# Patient Record
Sex: MALE | Race: WHITE | NOT HISPANIC OR LATINO | Employment: OTHER | URBAN - METROPOLITAN AREA
[De-identification: names, ages, dates, MRNs, and addresses within clinical notes are randomized per-mention and may not be internally consistent; named-entity substitution may affect disease eponyms.]

---

## 2017-02-25 ENCOUNTER — HOSPITAL ENCOUNTER (EMERGENCY)
Facility: HOSPITAL | Age: 72
Discharge: ANOTHER HOSPITAL | End: 2017-02-25
Attending: SURGERY
Payer: MEDICARE

## 2017-02-25 VITALS
WEIGHT: 270 LBS | HEART RATE: 64 BPM | SYSTOLIC BLOOD PRESSURE: 153 MMHG | DIASTOLIC BLOOD PRESSURE: 80 MMHG | TEMPERATURE: 97 F | OXYGEN SATURATION: 97 % | RESPIRATION RATE: 17 BRPM

## 2017-02-25 DIAGNOSIS — R07.9 CHEST PAIN, UNSPECIFIED TYPE: Primary | ICD-10-CM

## 2017-02-25 LAB
ALBUMIN SERPL BCP-MCNC: 3.5 G/DL
ALP SERPL-CCNC: 65 U/L
ALT SERPL W/O P-5'-P-CCNC: 23 U/L
ANION GAP SERPL CALC-SCNC: 9 MMOL/L
APTT BLDCRRT: 33.9 SEC
AST SERPL-CCNC: 20 U/L
BASOPHILS # BLD AUTO: 0.05 K/UL
BASOPHILS NFR BLD: 0.6 %
BILIRUB SERPL-MCNC: 0.5 MG/DL
BNP SERPL-MCNC: 108 PG/ML
BUN SERPL-MCNC: 14 MG/DL
CALCIUM SERPL-MCNC: 8.5 MG/DL
CHLORIDE SERPL-SCNC: 109 MMOL/L
CK MB SERPL-MCNC: 4 NG/ML
CK MB SERPL-RTO: 1.6 %
CK SERPL-CCNC: 244 U/L
CK SERPL-CCNC: 244 U/L
CO2 SERPL-SCNC: 22 MMOL/L
CREAT SERPL-MCNC: 1 MG/DL
D DIMER PPP IA.FEU-MCNC: 0.29 MG/L FEU
DIFFERENTIAL METHOD: ABNORMAL
EOSINOPHIL # BLD AUTO: 0.3 K/UL
EOSINOPHIL NFR BLD: 3.5 %
ERYTHROCYTE [DISTWIDTH] IN BLOOD BY AUTOMATED COUNT: 13.3 %
EST. GFR  (AFRICAN AMERICAN): >60 ML/MIN/1.73 M^2
EST. GFR  (NON AFRICAN AMERICAN): >60 ML/MIN/1.73 M^2
GLUCOSE SERPL-MCNC: 139 MG/DL
HCT VFR BLD AUTO: 40.8 %
HGB BLD-MCNC: 14.2 G/DL
INR PPP: 2.6
LYMPHOCYTES # BLD AUTO: 2.5 K/UL
LYMPHOCYTES NFR BLD: 28.8 %
MCH RBC QN AUTO: 32.1 PG
MCHC RBC AUTO-ENTMCNC: 34.8 %
MCV RBC AUTO: 92 FL
MONOCYTES # BLD AUTO: 0.8 K/UL
MONOCYTES NFR BLD: 9.1 %
NEUTROPHILS # BLD AUTO: 5 K/UL
NEUTROPHILS NFR BLD: 58 %
PLATELET # BLD AUTO: 213 K/UL
PMV BLD AUTO: 8.7 FL
POTASSIUM SERPL-SCNC: 3.9 MMOL/L
PROT SERPL-MCNC: 6.8 G/DL
PROTHROMBIN TIME: 25.7 SEC
RBC # BLD AUTO: 4.43 M/UL
SODIUM SERPL-SCNC: 140 MMOL/L
TROPONIN I SERPL DL<=0.01 NG/ML-MCNC: 0.02 NG/ML
WBC # BLD AUTO: 8.55 K/UL

## 2017-02-25 PROCEDURE — 36415 COLL VENOUS BLD VENIPUNCTURE: CPT

## 2017-02-25 PROCEDURE — 85025 COMPLETE CBC W/AUTO DIFF WBC: CPT

## 2017-02-25 PROCEDURE — 25000003 PHARM REV CODE 250: Performed by: SURGERY

## 2017-02-25 PROCEDURE — 99285 EMERGENCY DEPT VISIT HI MDM: CPT

## 2017-02-25 PROCEDURE — 85610 PROTHROMBIN TIME: CPT

## 2017-02-25 PROCEDURE — 84484 ASSAY OF TROPONIN QUANT: CPT

## 2017-02-25 PROCEDURE — 82553 CREATINE MB FRACTION: CPT

## 2017-02-25 PROCEDURE — 80053 COMPREHEN METABOLIC PANEL: CPT

## 2017-02-25 PROCEDURE — 93005 ELECTROCARDIOGRAM TRACING: CPT

## 2017-02-25 PROCEDURE — 85379 FIBRIN DEGRADATION QUANT: CPT

## 2017-02-25 PROCEDURE — 93010 ELECTROCARDIOGRAM REPORT: CPT | Mod: ,,, | Performed by: INTERNAL MEDICINE

## 2017-02-25 PROCEDURE — 85730 THROMBOPLASTIN TIME PARTIAL: CPT

## 2017-02-25 PROCEDURE — 83880 ASSAY OF NATRIURETIC PEPTIDE: CPT

## 2017-02-25 RX ORDER — SIMVASTATIN 20 MG/1
20 TABLET, FILM COATED ORAL NIGHTLY
Status: ON HOLD | COMMUNITY
End: 2022-11-03 | Stop reason: HOSPADM

## 2017-02-25 RX ORDER — ATORVASTATIN CALCIUM 40 MG/1
40 TABLET, FILM COATED ORAL
Status: COMPLETED | OUTPATIENT
Start: 2017-02-25 | End: 2017-02-25

## 2017-02-25 RX ORDER — PANTOPRAZOLE SODIUM 40 MG/1
40 TABLET, DELAYED RELEASE ORAL
Status: COMPLETED | OUTPATIENT
Start: 2017-02-25 | End: 2017-02-25

## 2017-02-25 RX ORDER — RAMIPRIL 5 MG/1
5 CAPSULE ORAL DAILY
Status: ON HOLD | COMMUNITY
End: 2022-11-03 | Stop reason: SDUPTHER

## 2017-02-25 RX ORDER — NAPROXEN SODIUM 220 MG/1
81 TABLET, FILM COATED ORAL
Status: COMPLETED | OUTPATIENT
Start: 2017-02-25 | End: 2017-02-25

## 2017-02-25 RX ADMIN — NITROGLYCERIN 1 INCH: 20 OINTMENT TOPICAL at 11:02

## 2017-02-25 RX ADMIN — ATORVASTATIN CALCIUM 40 MG: 40 TABLET, FILM COATED ORAL at 11:02

## 2017-02-25 RX ADMIN — ASPIRIN 81 MG 81 MG: 81 TABLET ORAL at 10:02

## 2017-02-25 RX ADMIN — PANTOPRAZOLE SODIUM 40 MG: 40 TABLET, DELAYED RELEASE ORAL at 11:02

## 2017-02-26 PROBLEM — K21.9 GERD (GASTROESOPHAGEAL REFLUX DISEASE): Chronic | Status: ACTIVE | Noted: 2017-02-26

## 2017-02-26 PROBLEM — I48.91 ATRIAL FIBRILLATION: Chronic | Status: ACTIVE | Noted: 2017-02-26

## 2017-02-26 PROBLEM — R07.9 CHEST PAIN AT REST: Status: ACTIVE | Noted: 2017-02-26

## 2017-02-26 NOTE — ED PROVIDER NOTES
[facility]                                        February 25, 2017                   Chief Complaint  71 y.o. male with Chest Pain    History of Present Illness  Luis Sorto Jr. presents to the emergency room with chest pain since this afternoon  Patient has had sharp substernal on-again off-again chest pain for last 4 hours per wife  Patient on EKG has rate controlled atrial fibrillation with no signs of RVR in the ER now  Patient has no shortness of breath, 96% room air oxygenation on presentation in the ER  The patient states that the pain is sharp, took aspirin at home with no relief this afternoon  Patient states the chest pain is new, he has no history of angina, no noted history of CAD    The history is provided by the patient  Past medical history: Atrial fibrillation  History reviewed. No pertinent surgical history.   No Known Allergies     Review of Systems and Physical Exam     Review of Systems  -- Constitution - no fever, denies fatigue, no weakness, no chills  -- Eyes - no tearing or redness, no visual disturbance  -- Ear, Nose - no tinnitus or earache, no nasal congestion or discharge  -- Mouth,Throat - no sore throat, no toothache, normal voice, normal swallowing  -- Respiratory - denies cough and congestion, no shortness of breath, no CORDOVA  -- Cardiovascular - chest pain, no palpitations, denies claudication  -- Gastrointestinal - denies abdominal pain, nausea, vomiting, or diarrhea  -- Musculoskeletal - denies back pain, negative for myalgias and arthralgias   -- Neurological - no headache, denies weakness or seizure; no LOC  -- Skin - denies pallor, rash, or changes in skin. no hives or welts noted    Vital Signs  -- His blood pressure is 148/74 (abnormal) and his pulse is 73.   -- His respiration is 16 and oxygen saturation is 96%.      Physical Exam  -- Nursing note and vitals reviewed  -- Constitutional: Appears well-developed and well-nourished  -- Head: Atraumatic.  Normocephalic. No obvious abnormality  -- Eyes: Pupils are equal and reactive to light. Normal conjunctiva and lids  -- Cardiac: Normal rate, regular rhythm and normal heart sounds  -- Pulmonary: Normal respiratory effort, breath sounds clear to auscultation  -- Abdominal: Soft, no tenderness. Normal bowel sounds. Normal liver edge  -- Musculoskeletal: Normal range of motion, no effusions. Joints stable   -- Neurological: No focal deficits. Showed good interaction with staff    Emergency Room Course     Treatment and Evaluation  -- The EKG findings today were without concerning findings from baseline   -- Chest x-ray showed no infiltrate and showed no acute pathology   -- The CBC drawn in the ER today was within normal limits   -- The electrolytes drawn in the ER today were within normal limits   -- The PT, PTT, and INR were within normal limits.    -- The cardiac enzymes were within normal limits   -- The d-dimer drawn in the ER today were within normal limits.   -- The BNP drawn in the ER today were within normal limits     Medications Given  -- pantoprazole EC tablet 40 mg (not administered)   -- atorvastatin tablet 40 mg (not administered)   -- nitroGLYCERIN 2% TD oint ointment 1 inch (not administered)   -- aspirin chewable tablet 81 mg (81 mg Oral Given 2/25/17 9870)     Diagnosis  -- The encounter diagnosis was Chest pain, unspecified type.    Disposition and Plan  -- Disposition: transfer  -- Condition: stable  -- The patient needs a higher level of care  -- The patient is appropriate for transfer  -- The patient was accepted for transfer at [place]    This note is dictated on Dragon Natural Speaking word recognition program.  There are word recognition mistakes that are occasionally missed on review.           Shay Kurtz MD  02/25/17 5935

## 2021-01-19 ENCOUNTER — IMMUNIZATION (OUTPATIENT)
Dept: FAMILY MEDICINE | Facility: CLINIC | Age: 76
End: 2021-01-19
Payer: MEDICARE

## 2021-01-19 DIAGNOSIS — Z23 NEED FOR VACCINATION: Primary | ICD-10-CM

## 2021-01-19 PROCEDURE — 91300 COVID-19, MRNA, LNP-S, PF, 30 MCG/0.3 ML DOSE VACCINE: CPT | Mod: PBBFAC | Performed by: INTERNAL MEDICINE

## 2021-02-09 ENCOUNTER — IMMUNIZATION (OUTPATIENT)
Dept: FAMILY MEDICINE | Facility: CLINIC | Age: 76
End: 2021-02-09
Payer: MEDICARE

## 2021-02-09 DIAGNOSIS — Z23 NEED FOR VACCINATION: Primary | ICD-10-CM

## 2021-02-09 PROCEDURE — 91300 COVID-19, MRNA, LNP-S, PF, 30 MCG/0.3 ML DOSE VACCINE: CPT | Mod: PBBFAC

## 2021-02-09 PROCEDURE — 0002A COVID-19, MRNA, LNP-S, PF, 30 MCG/0.3 ML DOSE VACCINE: CPT | Mod: PBBFAC

## 2022-11-03 PROBLEM — I48.19 PERSISTENT ATRIAL FIBRILLATION: Status: ACTIVE | Noted: 2017-02-26

## 2022-11-04 PROBLEM — I63.412 CEREBROVASCULAR ACCIDENT (CVA) DUE TO EMBOLISM OF LEFT MIDDLE CEREBRAL ARTERY: Status: ACTIVE | Noted: 2022-11-04

## 2022-11-05 ENCOUNTER — ANESTHESIA EVENT (OUTPATIENT)
Dept: SURGERY | Facility: HOSPITAL | Age: 77
DRG: 981 | End: 2022-11-05
Payer: MEDICARE

## 2022-11-05 ENCOUNTER — HOSPITAL ENCOUNTER (INPATIENT)
Facility: HOSPITAL | Age: 77
LOS: 10 days | Discharge: HOSPICE | DRG: 981 | End: 2022-11-15
Attending: PSYCHIATRY & NEUROLOGY | Admitting: PSYCHIATRY & NEUROLOGY
Payer: MEDICARE

## 2022-11-05 ENCOUNTER — ANESTHESIA (OUTPATIENT)
Dept: SURGERY | Facility: HOSPITAL | Age: 77
DRG: 981 | End: 2022-11-05
Payer: MEDICARE

## 2022-11-05 DIAGNOSIS — I48.19 PERSISTENT ATRIAL FIBRILLATION WITH RVR: ICD-10-CM

## 2022-11-05 DIAGNOSIS — I10 ESSENTIAL HYPERTENSION: ICD-10-CM

## 2022-11-05 DIAGNOSIS — E78.5 HYPERLIPIDEMIA, UNSPECIFIED HYPERLIPIDEMIA TYPE: ICD-10-CM

## 2022-11-05 DIAGNOSIS — I63.412 CEREBROVASCULAR ACCIDENT (CVA) DUE TO EMBOLISM OF LEFT MIDDLE CEREBRAL ARTERY: Primary | ICD-10-CM

## 2022-11-05 DIAGNOSIS — I63.9 CVA (CEREBRAL VASCULAR ACCIDENT): ICD-10-CM

## 2022-11-05 DIAGNOSIS — Z95.818 PRESENCE OF AMULET LEFT ATRIAL APPENDAGE CLOSURE DEVICE: ICD-10-CM

## 2022-11-05 DIAGNOSIS — C79.51 BONY METASTASIS: ICD-10-CM

## 2022-11-05 DIAGNOSIS — Z99.11 ON MECHANICALLY ASSISTED VENTILATION: ICD-10-CM

## 2022-11-05 DIAGNOSIS — E11.65 TYPE 2 DIABETES MELLITUS WITH HYPERGLYCEMIA, WITH LONG-TERM CURRENT USE OF INSULIN: ICD-10-CM

## 2022-11-05 DIAGNOSIS — Z71.89 GOALS OF CARE, COUNSELING/DISCUSSION: ICD-10-CM

## 2022-11-05 DIAGNOSIS — R50.9 FEVER, UNSPECIFIED FEVER CAUSE: ICD-10-CM

## 2022-11-05 DIAGNOSIS — R97.20 ELEVATED PSA: ICD-10-CM

## 2022-11-05 DIAGNOSIS — I63.9 STROKE: ICD-10-CM

## 2022-11-05 DIAGNOSIS — G93.6 CYTOTOXIC CEREBRAL EDEMA: ICD-10-CM

## 2022-11-05 DIAGNOSIS — I61.1 NONTRAUMATIC CORTICAL HEMORRHAGE OF LEFT CEREBRAL HEMISPHERE: ICD-10-CM

## 2022-11-05 DIAGNOSIS — R74.01 TRANSAMINITIS: ICD-10-CM

## 2022-11-05 DIAGNOSIS — T14.8XXA BRUISE: ICD-10-CM

## 2022-11-05 DIAGNOSIS — N30.00 ACUTE CYSTITIS WITHOUT HEMATURIA: ICD-10-CM

## 2022-11-05 DIAGNOSIS — G93.40 ACUTE ENCEPHALOPATHY: ICD-10-CM

## 2022-11-05 DIAGNOSIS — E87.1 HYPONATREMIA: ICD-10-CM

## 2022-11-05 DIAGNOSIS — J18.9 PNEUMONIA DUE TO INFECTIOUS ORGANISM, UNSPECIFIED LATERALITY, UNSPECIFIED PART OF LUNG: ICD-10-CM

## 2022-11-05 DIAGNOSIS — Z79.4 TYPE 2 DIABETES MELLITUS WITH HYPERGLYCEMIA, WITH LONG-TERM CURRENT USE OF INSULIN: ICD-10-CM

## 2022-11-05 DIAGNOSIS — I48.19 PERSISTENT ATRIAL FIBRILLATION: ICD-10-CM

## 2022-11-05 DIAGNOSIS — I63.413 CEREBROVASCULAR ACCIDENT (CVA) DUE TO BILATERAL EMBOLISM OF MIDDLE CEREBRAL ARTERIES: ICD-10-CM

## 2022-11-05 DIAGNOSIS — I72.4 PSEUDOANEURYSM OF FEMORAL ARTERY: ICD-10-CM

## 2022-11-05 DIAGNOSIS — G93.5 BRAIN COMPRESSION: ICD-10-CM

## 2022-11-05 PROBLEM — R19.09 GROIN SWELLING: Status: ACTIVE | Noted: 2022-11-05

## 2022-11-05 PROBLEM — E11.9 DIABETES MELLITUS: Status: ACTIVE | Noted: 2022-11-05

## 2022-11-05 PROBLEM — I61.9 LEFT-SIDED NONTRAUMATIC INTRACEREBRAL HEMORRHAGE: Status: ACTIVE | Noted: 2022-11-05

## 2022-11-05 LAB
ABO + RH BLD: NORMAL
ALBUMIN SERPL BCP-MCNC: 2.7 G/DL (ref 3.5–5.2)
ALLENS TEST: ABNORMAL
ALP SERPL-CCNC: 234 U/L (ref 55–135)
ALT SERPL W/O P-5'-P-CCNC: 11 U/L (ref 10–44)
ANION GAP SERPL CALC-SCNC: 17 MMOL/L (ref 8–16)
APTT BLDCRRT: 27.8 SEC (ref 21–32)
AST SERPL-CCNC: 17 U/L (ref 10–40)
AV INDEX (PROSTH): 1
AV MEAN GRADIENT: 3 MMHG
AV PEAK GRADIENT: 5 MMHG
AV VALVE AREA: 3.6 CM2
AV VELOCITY RATIO: 0.89
BACTERIA #/AREA URNS AUTO: ABNORMAL /HPF
BASOPHILS # BLD AUTO: 0.02 K/UL (ref 0–0.2)
BASOPHILS NFR BLD: 0.1 % (ref 0–1.9)
BILIRUB SERPL-MCNC: 2.4 MG/DL (ref 0.1–1)
BILIRUB UR QL STRIP: NEGATIVE
BLD GP AB SCN CELLS X3 SERPL QL: NORMAL
BLD PROD TYP BPU: NORMAL
BLOOD UNIT EXPIRATION DATE: NORMAL
BLOOD UNIT TYPE CODE: 6200
BLOOD UNIT TYPE CODE: NORMAL
BLOOD UNIT TYPE: NORMAL
BSA FOR ECHO PROCEDURE: 2.38 M2
BUN SERPL-MCNC: 13 MG/DL (ref 8–23)
CALCIUM SERPL-MCNC: 8.3 MG/DL (ref 8.7–10.5)
CHLORIDE SERPL-SCNC: 104 MMOL/L (ref 95–110)
CHOLEST SERPL-MCNC: 70 MG/DL (ref 120–199)
CHOLEST/HDLC SERPL: 2.7 {RATIO} (ref 2–5)
CK MB SERPL-MCNC: 2.4 NG/ML (ref 0.1–6.5)
CK MB SERPL-RTO: 2.1 % (ref 0–5)
CK SERPL-CCNC: 114 U/L (ref 20–200)
CLARITY UR REFRACT.AUTO: CLEAR
CO2 SERPL-SCNC: 15 MMOL/L (ref 23–29)
CODING SYSTEM: NORMAL
COLOR UR AUTO: YELLOW
CREAT SERPL-MCNC: 1.3 MG/DL (ref 0.5–1.4)
CV ECHO LV RWT: 0.6 CM
DELSYS: ABNORMAL
DIFFERENTIAL METHOD: ABNORMAL
DISPENSE STATUS: NORMAL
DOP CALC AO PEAK VEL: 1.1 M/S
DOP CALC AO VTI: 15.81 CM
DOP CALC LVOT AREA: 3.6 CM2
DOP CALC LVOT DIAMETER: 2.14 CM
DOP CALC LVOT PEAK VEL: 0.98 M/S
DOP CALC LVOT STROKE VOLUME: 56.87 CM3
DOP CALCLVOT PEAK VEL VTI: 15.82 CM
E/E' RATIO: 8.63 M/S
ECHO LV POSTERIOR WALL: 1.04 CM (ref 0.6–1.1)
EJECTION FRACTION: 45 %
EOSINOPHIL # BLD AUTO: 0 K/UL (ref 0–0.5)
EOSINOPHIL NFR BLD: 0 % (ref 0–8)
ERYTHROCYTE [DISTWIDTH] IN BLOOD BY AUTOMATED COUNT: 14.3 % (ref 11.5–14.5)
ERYTHROCYTE [SEDIMENTATION RATE] IN BLOOD BY WESTERGREN METHOD: 14 MM/H
EST. GFR  (NO RACE VARIABLE): 57.3 ML/MIN/1.73 M^2
ESTIMATED AVG GLUCOSE: 128 MG/DL (ref 68–131)
FIBRINOGEN PPP-MCNC: 369 MG/DL (ref 182–400)
FIO2: 50
FRACTIONAL SHORTENING: 33 % (ref 28–44)
GLUCOSE SERPL-MCNC: 335 MG/DL (ref 70–110)
GLUCOSE UR QL STRIP: NEGATIVE
HBA1C MFR BLD: 6.1 % (ref 4–5.6)
HCO3 UR-SCNC: 19.7 MMOL/L (ref 24–28)
HCT VFR BLD AUTO: 29 % (ref 40–54)
HCT VFR BLD CALC: 23 %PCV (ref 36–54)
HDLC SERPL-MCNC: 26 MG/DL (ref 40–75)
HDLC SERPL: 37.1 % (ref 20–50)
HGB BLD-MCNC: 9.2 G/DL (ref 14–18)
HGB UR QL STRIP: ABNORMAL
HYALINE CASTS UR QL AUTO: 0 /LPF
IMM GRANULOCYTES # BLD AUTO: 0.13 K/UL (ref 0–0.04)
IMM GRANULOCYTES NFR BLD AUTO: 0.9 % (ref 0–0.5)
INR PPP: 1.3 (ref 0.8–1.2)
INTERVENTRICULAR SEPTUM: 1.22 CM (ref 0.6–1.1)
IVRT: 108.47 MSEC
KETONES UR QL STRIP: NEGATIVE
LA MAJOR: 7.3 CM
LA MINOR: 5.97 CM
LA WIDTH: 5.4 CM
LACTATE SERPL-SCNC: 2.5 MMOL/L (ref 0.5–2.2)
LACTATE SERPL-SCNC: 3.3 MMOL/L (ref 0.5–2.2)
LACTATE SERPL-SCNC: 5.8 MMOL/L (ref 0.5–2.2)
LDLC SERPL CALC-MCNC: 27.6 MG/DL (ref 63–159)
LEFT ATRIUM SIZE: 4.77 CM
LEFT ATRIUM VOLUME INDEX MOD: 38.6 ML/M2
LEFT ATRIUM VOLUME INDEX: 62.5 ML/M2
LEFT ATRIUM VOLUME MOD: 88.86 CM3
LEFT ATRIUM VOLUME: 143.81 CM3
LEFT INTERNAL DIMENSION IN SYSTOLE: 2.33 CM (ref 2.1–4)
LEFT VENTRICLE DIASTOLIC VOLUME INDEX: 21.66 ML/M2
LEFT VENTRICLE DIASTOLIC VOLUME: 49.81 ML
LEFT VENTRICLE MASS INDEX: 53 G/M2
LEFT VENTRICLE SYSTOLIC VOLUME INDEX: 8.1 ML/M2
LEFT VENTRICLE SYSTOLIC VOLUME: 18.62 ML
LEFT VENTRICULAR INTERNAL DIMENSION IN DIASTOLE: 3.47 CM (ref 3.5–6)
LEFT VENTRICULAR MASS: 122.36 G
LEUKOCYTE ESTERASE UR QL STRIP: ABNORMAL
LV LATERAL E/E' RATIO: 6.9 M/S
LV SEPTAL E/E' RATIO: 11.5 M/S
LYMPHOCYTES # BLD AUTO: 1 K/UL (ref 1–4.8)
LYMPHOCYTES NFR BLD: 6.6 % (ref 18–48)
MCH RBC QN AUTO: 31.4 PG (ref 27–31)
MCHC RBC AUTO-ENTMCNC: 31.7 G/DL (ref 32–36)
MCV RBC AUTO: 99 FL (ref 82–98)
MICROSCOPIC COMMENT: ABNORMAL
MIN VOL: 10.8
MODE: ABNORMAL
MONOCYTES # BLD AUTO: 1.1 K/UL (ref 0.3–1)
MONOCYTES NFR BLD: 7.1 % (ref 4–15)
MV PEAK E VEL: 0.69 M/S
NEUTROPHILS # BLD AUTO: 12.7 K/UL (ref 1.8–7.7)
NEUTROPHILS NFR BLD: 85.3 % (ref 38–73)
NITRITE UR QL STRIP: NEGATIVE
NONHDLC SERPL-MCNC: 44 MG/DL
NRBC BLD-RTO: 0 /100 WBC
NUM UNITS TRANS PACKED RBC: NORMAL
PCO2 BLDA: 34.2 MMHG (ref 35–45)
PEEP: 5
PH SMN: 7.37 [PH] (ref 7.35–7.45)
PH UR STRIP: 6 [PH] (ref 5–8)
PIP: 18
PISA TR MAX VEL: 2.51 M/S
PLATELET # BLD AUTO: 201 K/UL (ref 150–450)
PMV BLD AUTO: 9.4 FL (ref 9.2–12.9)
PO2 BLDA: 252 MMHG (ref 80–100)
POC BE: -6 MMOL/L
POC IONIZED CALCIUM: 1.12 MMOL/L (ref 1.06–1.42)
POC SATURATED O2: 100 % (ref 95–100)
POC TCO2: 21 MMOL/L (ref 23–27)
POCT GLUCOSE: 140 MG/DL (ref 70–110)
POCT GLUCOSE: 161 MG/DL (ref 70–110)
POCT GLUCOSE: 162 MG/DL (ref 70–110)
POCT GLUCOSE: 162 MG/DL (ref 70–110)
POCT GLUCOSE: 168 MG/DL (ref 70–110)
POCT GLUCOSE: 190 MG/DL (ref 70–110)
POCT GLUCOSE: 203 MG/DL (ref 70–110)
POCT GLUCOSE: 248 MG/DL (ref 70–110)
POCT GLUCOSE: 286 MG/DL (ref 70–110)
POCT GLUCOSE: 298 MG/DL (ref 70–110)
POCT GLUCOSE: 301 MG/DL (ref 70–110)
POCT GLUCOSE: 312 MG/DL (ref 70–110)
POTASSIUM BLD-SCNC: 3.9 MMOL/L (ref 3.5–5.1)
POTASSIUM SERPL-SCNC: 3.5 MMOL/L (ref 3.5–5.1)
POTASSIUM SERPL-SCNC: 3.9 MMOL/L (ref 3.5–5.1)
PROT SERPL-MCNC: 5.3 G/DL (ref 6–8.4)
PROT UR QL STRIP: ABNORMAL
PROTHROMBIN TIME: 13.3 SEC (ref 9–12.5)
RA MAJOR: 4.94 CM
RA PRESSURE: 3 MMHG
RA WIDTH: 3.32 CM
RBC # BLD AUTO: 2.93 M/UL (ref 4.6–6.2)
RBC #/AREA URNS AUTO: 74 /HPF (ref 0–4)
RIGHT VENTRICULAR END-DIASTOLIC DIMENSION: 2.97 CM
RV TISSUE DOPPLER FREE WALL SYSTOLIC VELOCITY 1 (APICAL 4 CHAMBER VIEW): 9.22 CM/S
SAMPLE: ABNORMAL
SINUS: 3.35 CM
SITE: ABNORMAL
SODIUM BLD-SCNC: 136 MMOL/L (ref 136–145)
SODIUM SERPL-SCNC: 136 MMOL/L (ref 136–145)
SP GR UR STRIP: >1.03 (ref 1–1.03)
SP02: 100
STJ: 2.92 CM
TDI LATERAL: 0.1 M/S
TDI SEPTAL: 0.06 M/S
TDI: 0.08 M/S
TR MAX PG: 25 MMHG
TRICUSPID ANNULAR PLANE SYSTOLIC EXCURSION: 1.26 CM
TRIGL SERPL-MCNC: 82 MG/DL (ref 30–150)
TROPONIN I SERPL DL<=0.01 NG/ML-MCNC: 0.05 NG/ML (ref 0–0.03)
TROPONIN I SERPL DL<=0.01 NG/ML-MCNC: 0.09 NG/ML (ref 0–0.03)
TROPONIN I SERPL DL<=0.01 NG/ML-MCNC: 0.14 NG/ML (ref 0–0.03)
TSH SERPL DL<=0.005 MIU/L-ACNC: 2.09 UIU/ML (ref 0.4–4)
TV REST PULMONARY ARTERY PRESSURE: 28 MMHG
UNIT NUMBER: NORMAL
URN SPEC COLLECT METH UR: ABNORMAL
VT: 500
WBC # BLD AUTO: 14.86 K/UL (ref 3.9–12.7)
WBC #/AREA URNS AUTO: 32 /HPF (ref 0–5)

## 2022-11-05 PROCEDURE — 25000003 PHARM REV CODE 250: Performed by: PHYSICIAN ASSISTANT

## 2022-11-05 PROCEDURE — 82553 CREATINE MB FRACTION: CPT | Performed by: PHYSICIAN ASSISTANT

## 2022-11-05 PROCEDURE — 37000008 HC ANESTHESIA 1ST 15 MINUTES: Performed by: SURGERY

## 2022-11-05 PROCEDURE — 36000706: Performed by: SURGERY

## 2022-11-05 PROCEDURE — 25000003 PHARM REV CODE 250: Performed by: NURSE ANESTHETIST, CERTIFIED REGISTERED

## 2022-11-05 PROCEDURE — 93010 EKG 12-LEAD: ICD-10-PCS | Mod: ,,, | Performed by: INTERNAL MEDICINE

## 2022-11-05 PROCEDURE — 85025 COMPLETE CBC W/AUTO DIFF WBC: CPT | Mod: 91 | Performed by: PHYSICIAN ASSISTANT

## 2022-11-05 PROCEDURE — 80307 DRUG TEST PRSMV CHEM ANLYZR: CPT | Performed by: PHYSICIAN ASSISTANT

## 2022-11-05 PROCEDURE — 81001 URINALYSIS AUTO W/SCOPE: CPT | Mod: 91 | Performed by: PHYSICIAN ASSISTANT

## 2022-11-05 PROCEDURE — 27000221 HC OXYGEN, UP TO 24 HOURS

## 2022-11-05 PROCEDURE — 63600175 PHARM REV CODE 636 W HCPCS: Performed by: NURSE PRACTITIONER

## 2022-11-05 PROCEDURE — 63600175 PHARM REV CODE 636 W HCPCS: Performed by: PHYSICIAN ASSISTANT

## 2022-11-05 PROCEDURE — 99291 CRITICAL CARE FIRST HOUR: CPT | Mod: 25,,, | Performed by: PSYCHIATRY & NEUROLOGY

## 2022-11-05 PROCEDURE — 99900026 HC AIRWAY MAINTENANCE (STAT)

## 2022-11-05 PROCEDURE — 93005 ELECTROCARDIOGRAM TRACING: CPT

## 2022-11-05 PROCEDURE — 37000009 HC ANESTHESIA EA ADD 15 MINS: Performed by: SURGERY

## 2022-11-05 PROCEDURE — 27201423 OPTIME MED/SURG SUP & DEVICES STERILE SUPPLY: Performed by: SURGERY

## 2022-11-05 PROCEDURE — 94002 VENT MGMT INPAT INIT DAY: CPT

## 2022-11-05 PROCEDURE — A4216 STERILE WATER/SALINE, 10 ML: HCPCS | Performed by: PHYSICIAN ASSISTANT

## 2022-11-05 PROCEDURE — 99900035 HC TECH TIME PER 15 MIN (STAT)

## 2022-11-05 PROCEDURE — D9220A PRA ANESTHESIA: Mod: ANES,,, | Performed by: ANESTHESIOLOGY

## 2022-11-05 PROCEDURE — 37799 UNLISTED PX VASCULAR SURGERY: CPT

## 2022-11-05 PROCEDURE — 99223 PR INITIAL HOSPITAL CARE,LEVL III: ICD-10-PCS | Mod: GC,,, | Performed by: NEUROLOGICAL SURGERY

## 2022-11-05 PROCEDURE — 86920 COMPATIBILITY TEST SPIN: CPT | Performed by: NURSE PRACTITIONER

## 2022-11-05 PROCEDURE — C1729 CATH, DRAINAGE: HCPCS | Performed by: SURGERY

## 2022-11-05 PROCEDURE — 86850 RBC ANTIBODY SCREEN: CPT | Mod: 91 | Performed by: PHYSICIAN ASSISTANT

## 2022-11-05 PROCEDURE — 83605 ASSAY OF LACTIC ACID: CPT | Mod: 91 | Performed by: NURSE PRACTITIONER

## 2022-11-05 PROCEDURE — 83036 HEMOGLOBIN GLYCOSYLATED A1C: CPT | Performed by: PHYSICIAN ASSISTANT

## 2022-11-05 PROCEDURE — 86965 POOLING BLOOD PLATELETS: CPT | Performed by: PHYSICIAN ASSISTANT

## 2022-11-05 PROCEDURE — 63600175 PHARM REV CODE 636 W HCPCS: Performed by: SURGERY

## 2022-11-05 PROCEDURE — P9016 RBC LEUKOCYTES REDUCED: HCPCS | Performed by: STUDENT IN AN ORGANIZED HEALTH CARE EDUCATION/TRAINING PROGRAM

## 2022-11-05 PROCEDURE — 80053 COMPREHEN METABOLIC PANEL: CPT | Performed by: PHYSICIAN ASSISTANT

## 2022-11-05 PROCEDURE — 94761 N-INVAS EAR/PLS OXIMETRY MLT: CPT

## 2022-11-05 PROCEDURE — 86920 COMPATIBILITY TEST SPIN: CPT | Performed by: STUDENT IN AN ORGANIZED HEALTH CARE EDUCATION/TRAINING PROGRAM

## 2022-11-05 PROCEDURE — 25000003 PHARM REV CODE 250: Performed by: NURSE PRACTITIONER

## 2022-11-05 PROCEDURE — 20000000 HC ICU ROOM

## 2022-11-05 PROCEDURE — 35141 PR REANEURYSM/GRFT INS,COMMON FEMORAL: ICD-10-PCS | Mod: RT,,, | Performed by: SURGERY

## 2022-11-05 PROCEDURE — 25500020 PHARM REV CODE 255: Performed by: PSYCHIATRY & NEUROLOGY

## 2022-11-05 PROCEDURE — 99291 PR CRITICAL CARE, E/M 30-74 MINUTES: ICD-10-PCS | Mod: 25,,, | Performed by: PSYCHIATRY & NEUROLOGY

## 2022-11-05 PROCEDURE — 36620 PR INSERT CATH,ART,PERCUT,SHORTTERM: ICD-10-PCS | Mod: ,,, | Performed by: PHYSICIAN ASSISTANT

## 2022-11-05 PROCEDURE — 85384 FIBRINOGEN ACTIVITY: CPT | Performed by: PSYCHIATRY & NEUROLOGY

## 2022-11-05 PROCEDURE — 36000707: Performed by: SURGERY

## 2022-11-05 PROCEDURE — 84484 ASSAY OF TROPONIN QUANT: CPT | Mod: 91 | Performed by: NURSE PRACTITIONER

## 2022-11-05 PROCEDURE — 84484 ASSAY OF TROPONIN QUANT: CPT | Mod: 91 | Performed by: PHYSICIAN ASSISTANT

## 2022-11-05 PROCEDURE — 63600175 PHARM REV CODE 636 W HCPCS: Performed by: NURSE ANESTHETIST, CERTIFIED REGISTERED

## 2022-11-05 PROCEDURE — D9220A PRA ANESTHESIA: ICD-10-PCS | Mod: CRNA,,, | Performed by: NURSE ANESTHETIST, CERTIFIED REGISTERED

## 2022-11-05 PROCEDURE — D9220A PRA ANESTHESIA: Mod: CRNA,,, | Performed by: NURSE ANESTHETIST, CERTIFIED REGISTERED

## 2022-11-05 PROCEDURE — P9016 RBC LEUKOCYTES REDUCED: HCPCS | Performed by: NURSE PRACTITIONER

## 2022-11-05 PROCEDURE — 36620 INSERTION CATHETER ARTERY: CPT | Mod: ,,, | Performed by: PHYSICIAN ASSISTANT

## 2022-11-05 PROCEDURE — P9012 CRYOPRECIPITATE EACH UNIT: HCPCS | Performed by: PHYSICIAN ASSISTANT

## 2022-11-05 PROCEDURE — 82803 BLOOD GASES ANY COMBINATION: CPT

## 2022-11-05 PROCEDURE — 93010 ELECTROCARDIOGRAM REPORT: CPT | Mod: ,,, | Performed by: INTERNAL MEDICINE

## 2022-11-05 PROCEDURE — 99223 PR INITIAL HOSPITAL CARE,LEVL III: ICD-10-PCS | Mod: ,,, | Performed by: PSYCHIATRY & NEUROLOGY

## 2022-11-05 PROCEDURE — 85610 PROTHROMBIN TIME: CPT | Performed by: PHYSICIAN ASSISTANT

## 2022-11-05 PROCEDURE — 85730 THROMBOPLASTIN TIME PARTIAL: CPT | Performed by: PHYSICIAN ASSISTANT

## 2022-11-05 PROCEDURE — 99223 1ST HOSP IP/OBS HIGH 75: CPT | Mod: GC,,, | Performed by: NEUROLOGICAL SURGERY

## 2022-11-05 PROCEDURE — 84132 ASSAY OF SERUM POTASSIUM: CPT | Performed by: NURSE PRACTITIONER

## 2022-11-05 PROCEDURE — 84443 ASSAY THYROID STIM HORMONE: CPT | Performed by: PHYSICIAN ASSISTANT

## 2022-11-05 PROCEDURE — 80061 LIPID PANEL: CPT | Performed by: PHYSICIAN ASSISTANT

## 2022-11-05 PROCEDURE — 35141 REPAIR DEFECT OF ARTERY: CPT | Mod: RT,,, | Performed by: SURGERY

## 2022-11-05 PROCEDURE — D9220A PRA ANESTHESIA: ICD-10-PCS | Mod: ANES,,, | Performed by: ANESTHESIOLOGY

## 2022-11-05 PROCEDURE — 99223 1ST HOSP IP/OBS HIGH 75: CPT | Mod: ,,, | Performed by: PSYCHIATRY & NEUROLOGY

## 2022-11-05 PROCEDURE — 25000003 PHARM REV CODE 250

## 2022-11-05 RX ORDER — METOPROLOL TARTRATE 1 MG/ML
INJECTION, SOLUTION INTRAVENOUS
Status: COMPLETED
Start: 2022-11-05 | End: 2022-11-05

## 2022-11-05 RX ORDER — HYDROCODONE BITARTRATE AND ACETAMINOPHEN 500; 5 MG/1; MG/1
TABLET ORAL
Status: DISCONTINUED | OUTPATIENT
Start: 2022-11-05 | End: 2022-11-08

## 2022-11-05 RX ORDER — LABETALOL HCL 20 MG/4 ML
10 SYRINGE (ML) INTRAVENOUS
Status: DISCONTINUED | OUTPATIENT
Start: 2022-11-05 | End: 2022-11-05

## 2022-11-05 RX ORDER — PHENYLEPHRINE HCL IN 0.9% NACL 1 MG/10 ML
600 SYRINGE (ML) INTRAVENOUS ONCE
Status: COMPLETED | OUTPATIENT
Start: 2022-11-05 | End: 2022-11-05

## 2022-11-05 RX ORDER — PHENYLEPHRINE HCL IN 0.9% NACL 1 MG/10 ML
SYRINGE (ML) INTRAVENOUS
Status: COMPLETED
Start: 2022-11-05 | End: 2022-11-05

## 2022-11-05 RX ORDER — LEVETIRACETAM 500 MG/5ML
500 INJECTION, SOLUTION, CONCENTRATE INTRAVENOUS EVERY 12 HOURS
Status: DISCONTINUED | OUTPATIENT
Start: 2022-11-05 | End: 2022-11-08

## 2022-11-05 RX ORDER — ATORVASTATIN CALCIUM 40 MG/1
40 TABLET, FILM COATED ORAL DAILY
Status: DISCONTINUED | OUTPATIENT
Start: 2022-11-05 | End: 2022-11-14

## 2022-11-05 RX ORDER — POTASSIUM CHLORIDE 14.9 MG/ML
INJECTION INTRAVENOUS CONTINUOUS PRN
Status: DISCONTINUED | OUTPATIENT
Start: 2022-11-05 | End: 2022-11-05

## 2022-11-05 RX ORDER — NOREPINEPHRINE BITARTRATE/D5W 4MG/250ML
0-3 PLASTIC BAG, INJECTION (ML) INTRAVENOUS CONTINUOUS
Status: DISCONTINUED | OUTPATIENT
Start: 2022-11-05 | End: 2022-11-05

## 2022-11-05 RX ORDER — NOREPINEPHRINE BITARTRATE/D5W 4MG/250ML
PLASTIC BAG, INJECTION (ML) INTRAVENOUS
Status: DISCONTINUED
Start: 2022-11-05 | End: 2022-11-05 | Stop reason: WASHOUT

## 2022-11-05 RX ORDER — FENTANYL CITRATE-0.9 % NACL/PF 10 MCG/ML
0-200 PLASTIC BAG, INJECTION (ML) INTRAVENOUS CONTINUOUS
Status: DISCONTINUED | OUTPATIENT
Start: 2022-11-05 | End: 2022-11-08

## 2022-11-05 RX ORDER — PHENYLEPHRINE HCL IN 0.9% NACL 1 MG/10 ML
100 SYRINGE (ML) INTRAVENOUS ONCE
Status: DISCONTINUED | OUTPATIENT
Start: 2022-11-05 | End: 2022-11-05

## 2022-11-05 RX ORDER — METOPROLOL TARTRATE 1 MG/ML
5 INJECTION, SOLUTION INTRAVENOUS ONCE
Status: COMPLETED | OUTPATIENT
Start: 2022-11-05 | End: 2022-11-05

## 2022-11-05 RX ORDER — NICARDIPINE HYDROCHLORIDE 0.2 MG/ML
0-15 INJECTION INTRAVENOUS CONTINUOUS
Status: DISCONTINUED | OUTPATIENT
Start: 2022-11-05 | End: 2022-11-06

## 2022-11-05 RX ORDER — SODIUM CHLORIDE 0.9 % (FLUSH) 0.9 %
3 SYRINGE (ML) INJECTION EVERY 8 HOURS
Status: DISCONTINUED | OUTPATIENT
Start: 2022-11-05 | End: 2022-11-14

## 2022-11-05 RX ORDER — ROCURONIUM BROMIDE 10 MG/ML
INJECTION, SOLUTION INTRAVENOUS
Status: DISCONTINUED | OUTPATIENT
Start: 2022-11-05 | End: 2022-11-05

## 2022-11-05 RX ORDER — FAMOTIDINE 20 MG/1
20 TABLET, FILM COATED ORAL 2 TIMES DAILY
Status: DISCONTINUED | OUTPATIENT
Start: 2022-11-05 | End: 2022-11-14

## 2022-11-05 RX ORDER — METOPROLOL TARTRATE 50 MG/1
50 TABLET ORAL 2 TIMES DAILY
Status: DISCONTINUED | OUTPATIENT
Start: 2022-11-05 | End: 2022-11-11

## 2022-11-05 RX ORDER — METOPROLOL TARTRATE 1 MG/ML
5 INJECTION, SOLUTION INTRAVENOUS EVERY 5 MIN PRN
Status: DISCONTINUED | OUTPATIENT
Start: 2022-11-05 | End: 2022-11-10

## 2022-11-05 RX ORDER — LABETALOL HCL 20 MG/4 ML
10 SYRINGE (ML) INTRAVENOUS
Status: DISCONTINUED | OUTPATIENT
Start: 2022-11-05 | End: 2022-11-09

## 2022-11-05 RX ORDER — HEPARIN SODIUM 5000 [USP'U]/ML
INJECTION, SOLUTION INTRAVENOUS; SUBCUTANEOUS
Status: DISCONTINUED | OUTPATIENT
Start: 2022-11-05 | End: 2022-11-05 | Stop reason: HOSPADM

## 2022-11-05 RX ORDER — ATORVASTATIN CALCIUM 40 MG/1
80 TABLET, FILM COATED ORAL DAILY
Status: DISCONTINUED | OUTPATIENT
Start: 2022-11-05 | End: 2022-11-05

## 2022-11-05 RX ORDER — PHENYLEPHRINE HCL IN 0.9% NACL 20MG/250ML
0-5 PLASTIC BAG, INJECTION (ML) INTRAVENOUS CONTINUOUS
Status: DISCONTINUED | OUTPATIENT
Start: 2022-11-05 | End: 2022-11-06

## 2022-11-05 RX ORDER — METOPROLOL TARTRATE 25 MG/1
25 TABLET, FILM COATED ORAL 2 TIMES DAILY
Status: ON HOLD | COMMUNITY
Start: 2022-10-17 | End: 2022-11-14 | Stop reason: HOSPADM

## 2022-11-05 RX ORDER — ACETAMINOPHEN 325 MG/1
650 TABLET ORAL EVERY 6 HOURS PRN
Status: DISCONTINUED | OUTPATIENT
Start: 2022-11-05 | End: 2022-11-14

## 2022-11-05 RX ORDER — METOPROLOL TARTRATE 1 MG/ML
INJECTION, SOLUTION INTRAVENOUS
Status: DISCONTINUED | OUTPATIENT
Start: 2022-11-05 | End: 2022-11-05

## 2022-11-05 RX ORDER — TRAVOPROST OPHTHALMIC SOLUTION 0.04 MG/ML
1 SOLUTION OPHTHALMIC NIGHTLY
Status: ON HOLD | COMMUNITY
Start: 2022-09-19 | End: 2022-11-14 | Stop reason: HOSPADM

## 2022-11-05 RX ORDER — FENTANYL CITRATE 50 UG/ML
50 INJECTION, SOLUTION INTRAMUSCULAR; INTRAVENOUS
Status: DISCONTINUED | OUTPATIENT
Start: 2022-11-05 | End: 2022-11-09

## 2022-11-05 RX ORDER — INDOMETHACIN 25 MG/1
CAPSULE ORAL
Status: DISCONTINUED | OUTPATIENT
Start: 2022-11-05 | End: 2022-11-05

## 2022-11-05 RX ORDER — PROTAMINE SULFATE 10 MG/ML
INJECTION, SOLUTION INTRAVENOUS
Status: DISCONTINUED | OUTPATIENT
Start: 2022-11-05 | End: 2022-11-05

## 2022-11-05 RX ORDER — ONDANSETRON 2 MG/ML
4 INJECTION INTRAMUSCULAR; INTRAVENOUS EVERY 6 HOURS PRN
Status: DISCONTINUED | OUTPATIENT
Start: 2022-11-05 | End: 2022-11-14

## 2022-11-05 RX ORDER — HEPARIN SODIUM 1000 [USP'U]/ML
INJECTION, SOLUTION INTRAVENOUS; SUBCUTANEOUS
Status: DISCONTINUED | OUTPATIENT
Start: 2022-11-05 | End: 2022-11-05

## 2022-11-05 RX ADMIN — Medication 600 MCG: at 06:11

## 2022-11-05 RX ADMIN — LEVETIRACETAM 500 MG: 100 INJECTION, SOLUTION INTRAVENOUS at 08:11

## 2022-11-05 RX ADMIN — Medication 3 ML: at 10:11

## 2022-11-05 RX ADMIN — SODIUM CHLORIDE 1000 ML: 0.9 INJECTION, SOLUTION INTRAVENOUS at 07:11

## 2022-11-05 RX ADMIN — SODIUM CHLORIDE, SODIUM GLUCONATE, SODIUM ACETATE, POTASSIUM CHLORIDE, MAGNESIUM CHLORIDE, SODIUM PHOSPHATE, DIBASIC, AND POTASSIUM PHOSPHATE: .53; .5; .37; .037; .03; .012; .00082 INJECTION, SOLUTION INTRAVENOUS at 03:11

## 2022-11-05 RX ADMIN — METOROPROLOL TARTRATE 5 MG: 5 INJECTION, SOLUTION INTRAVENOUS at 09:11

## 2022-11-05 RX ADMIN — ROCURONIUM BROMIDE 20 MG: 10 INJECTION INTRAVENOUS at 05:11

## 2022-11-05 RX ADMIN — PROTAMINE SULFATE 5 MG: 10 INJECTION, SOLUTION INTRAVENOUS at 05:11

## 2022-11-05 RX ADMIN — METOPROLOL TARTRATE 5 MG: 1 INJECTION, SOLUTION INTRAVENOUS at 06:11

## 2022-11-05 RX ADMIN — FAMOTIDINE 20 MG: 20 TABLET ORAL at 10:11

## 2022-11-05 RX ADMIN — LEVETIRACETAM 500 MG: 100 INJECTION, SOLUTION INTRAVENOUS at 10:11

## 2022-11-05 RX ADMIN — CEFAZOLIN 3 G: 330 INJECTION, POWDER, FOR SOLUTION INTRAMUSCULAR; INTRAVENOUS at 04:11

## 2022-11-05 RX ADMIN — PROTAMINE SULFATE 30 MG: 10 INJECTION, SOLUTION INTRAVENOUS at 05:11

## 2022-11-05 RX ADMIN — Medication 25 MCG/HR: at 10:11

## 2022-11-05 RX ADMIN — Medication 0.02 MCG/KG/MIN: at 03:11

## 2022-11-05 RX ADMIN — METOROPROLOL TARTRATE 5 MG: 5 INJECTION, SOLUTION INTRAVENOUS at 06:11

## 2022-11-05 RX ADMIN — SODIUM BICARBONATE 50 MEQ: 84 INJECTION, SOLUTION INTRAVENOUS at 05:11

## 2022-11-05 RX ADMIN — HUMAN ALBUMIN MICROSPHERES AND PERFLUTREN 0.11 MG: 10; .22 INJECTION, SOLUTION INTRAVENOUS at 02:11

## 2022-11-05 RX ADMIN — METOPROLOL TARTRATE 50 MG: 50 TABLET, FILM COATED ORAL at 10:11

## 2022-11-05 RX ADMIN — METOPROLOL TARTRATE 2 MG: 5 INJECTION, SOLUTION INTRAVENOUS at 04:11

## 2022-11-05 RX ADMIN — POTASSIUM CHLORIDE: 14.9 INJECTION INTRAVENOUS at 05:11

## 2022-11-05 RX ADMIN — METOPROLOL TARTRATE 2.5 MG: 5 INJECTION, SOLUTION INTRAVENOUS at 04:11

## 2022-11-05 RX ADMIN — INSULIN HUMAN 4 UNITS/HR: 1 INJECTION, SOLUTION INTRAVENOUS at 11:11

## 2022-11-05 RX ADMIN — METOPROLOL TARTRATE 2.5 MG: 5 INJECTION, SOLUTION INTRAVENOUS at 05:11

## 2022-11-05 RX ADMIN — HEPARIN SODIUM 5000 UNITS: 1000 INJECTION, SOLUTION INTRAVENOUS; SUBCUTANEOUS at 05:11

## 2022-11-05 RX ADMIN — Medication 3 ML: at 02:11

## 2022-11-05 RX ADMIN — ROCURONIUM BROMIDE 20 MG: 10 INJECTION INTRAVENOUS at 04:11

## 2022-11-05 RX ADMIN — ATORVASTATIN CALCIUM 40 MG: 40 TABLET, FILM COATED ORAL at 10:11

## 2022-11-05 RX ADMIN — INSULIN HUMAN 2.5 UNITS/HR: 1 INJECTION, SOLUTION INTRAVENOUS at 08:11

## 2022-11-05 NOTE — PROCEDURES
"Luis Sorto Jr. is a 76 y.o. male patient.    Temp: 97.7 °F (36.5 °C) (11/05/22 0845)  Pulse: (!) 132 (11/05/22 0845)  Resp: (!) 23 (11/05/22 0845)  BP: (!) 151/62 (11/05/22 0845)  SpO2: 100 % (11/05/22 0845)  Weight: 117 kg (257 lb 15 oz) (11/05/22 0715)  Height: 5' 9" (175.3 cm) (11/05/22 0715)       Arterial Line    Date/Time: 11/5/2022 8:51 AM  Location procedure was performed: [facility] NEURO CRITICAL CARE  Performed by: ELAINA Juarez  Authorized by: ELAINA Juarez   Consent Done: Emergent Situation  Preparation: Patient was prepped and draped in the usual sterile fashion.  Indications: multiple ABGs, respiratory failure and hemodynamic monitoring  Location: left radial    Patient sedated: no  Armen's test normal: yes  Needle gauge: 20  Seldinger technique: Seldinger technique used  Number of attempts: 2  Complications: No  Specimens: No  Implants: No  Post-procedure: dressing applied  Post-procedure CMS: normal  Patient tolerance: Patient tolerated the procedure well with no immediate complications        11/5/2022    "

## 2022-11-05 NOTE — CONSULTS
[facility] - Neuro Critical Care  Vascular Neurology  Comprehensive Stroke Center  Consult Note    Inpatient consult to Vascular (Stroke) Neurology  Consult performed by: Shaylee Son, DEIRDRE, NP  Consult ordered by: ELAINA Juarez  Reason for consult: transfer, L MCA stroke s/p TNKase w/ICH         Assessment/Plan:     Left-sided nontraumatic intracerebral hemorrhage  -On imaging obtained prior to transfer.  Admitted to [facility].  -Neurosurgery consulted   -See L MCA stroke for full plan.      Cerebrovascular accident (CVA) due to embolism of left middle cerebral artery  Luis Sorto Jr. is a 76 y.o. male with significant medical history of AFib (on Eliquis),CAD, HTN, DM II, s/p LAAO w/Amulet procedure on 11/04/2022 presented to hospital as a transfer from [facility] for evaluation of L MCA stroke w/ICH.  He received TNKase.    Antithrombotics for secondary stroke prevention: Antiplatelets: None: Intracerebral Hemorrhage    Statins for secondary stroke prevention and hyperlipidemia, if present:   Statins: Atorvastatin- 40 mg daily    Aggressive risk factor modification: HTN, DM, HLD, Obesity, A-Fib     Rehab efforts: The patient has been evaluated by a stroke team provider and the therapy needs have been fully considered based off the presenting complaints and exam findings. The following therapy evaluations are needed: Therapy evals when the patient is able to participate    Diagnostics ordered/pending: CT scan of head without contrast to asses brain parenchyma, HgbA1C to assess blood glucose levels, Lipid Profile to assess cholesterol levels, TSH to assess thyroid function    VTE prophylaxis: Mechanical prophylaxis: Place SCDs  None: Reason for No Pharmacological VTE Prophylaxis: History of systemic or intracranial bleeding    BP parameters: SBP<140; Avoid hypotension.        Persistent atrial fibrillation  -S/p LAAO w/amulet on 11/04/2022.  -Taken off of Eliquis prior to the  procedure.  Recommended for DAPT post procedure by his Cardiology team.        STROKE DOCUMENTATION          NIH Scale:  Interval: baseline  1a. Level of Consciousness: 3-->Responds only with reflex motor or autonomic effects or totally unresponsive, flaccid, and areflexic  1b. LOC Questions: 2-->Answers neither question correctly  1c. LOC Commands: 2-->Performs neither task correctly  2. Best Gaze: 1-->Partial gaze palsy, gaze is abnormal in one or both eyes, but forced deviation or total gaze paresis is not present  3. Visual: 0-->No visual loss  4. Facial Palsy: 0-->Normal symmetrical movements  5a. Motor Arm, Left: 3-->No effort against gravity, limb falls  5b. Motor Arm, Right: 3-->No effort against gravity, limb falls  6a. Motor Leg, Left: 3-->No effort against gravity, leg falls to bed immediately  6b. Motor Leg, Right: 0-->No drift, leg holds 30 degree position for full 5 secs  7. Limb Ataxia: 0-->Absent  8. Sensory: 0-->Normal, no sensory loss  9. Best Language: 3-->Mute, global aphasia, no usable speech or auditory comprehension  10. Dysarthria: (UN) Intubated or other physical barrier  11. Extinction and Inattention (formerly Neglect): 2-->Profound tania-inattention/extinction more than 1 modality  Total (NIH Stroke Scale): 22    Modified Zandra Score: 1  Dunia Coma Scale:3   ABCD2 Score:    [id number] Score:   HAS -BLED Score:   ICH Score:4  Hunt & Clinton Classification:       Thrombolysis Candidate? Yes, given prior to arrival at outside hospital    Delays to Thrombolysis?  No    Interventional Revascularization Candidate?   Is the patient eligible for mechanical endovascular reperfusion (BERNARDA)?  No; no significant neurologic deficit (NIHSS <6)     Delays to Thrombectomy? Not Applicable    Hemorrhagic change of an Ischemic Stroke: Does this patient have an ischemic stroke with hemorrhagic changes? No    Subjective:     History of Present Illness:  Luis Sorto Jr. is a 76 y.o. male with significant  "medical history of AFib (on Eliquis),CAD, HTN, DM II, s/p LAAO w/amulet procedure on 11/04/2022 presented to hospital as a transfer from [facility] for evaluation of L MCA stroke w/ICH.  HPI information gathered from review of the patient's medical record due to the patient being intubated, no family at the bedside.  Patient was LKN around 2000.  He had been discharged home s/p LAAO w/Amulet device on 11/4/2022 around 1000 am.  He acutely developed confusion with difficulty walking and standing, difficulty conversing, and difficulty finding his bathroom.  There were no reported preceding symptoms and nothing was reported to exacerbate or alleviate the symptoms. He presented to the [facility] ED where a CTH was obtained and was negative for acute findings.  Telestroke consult was performed by .   TNKase was administered as the patient had been off of Eliquis >24h .  Of note, per the ED record:    "On re-evaluation the patient was noted to have increased pain and swelling in the right groin in the area of the venous access from his cardiology procedure this morning.  We continue to monitor this and then the patient began to have bleeding from the access site.  There was also continued bruising and swelling in the anterior thigh on the right side.  During this time the patient was having some waxing and waning mentation but still moving all 4 extremities.  Patient then began to have a decrease in his blood pressure.  I did call and speak to Dr. Titus rajan on-call he came in to help evaluate the patient.  Patient was started on Levophed was given blood transfusions as well as IV fluids and with this patient's blood pressure did improve.  Of evaluation by Cardiology he placed IV in the left groin and held pressure within the right groin and then placed a pressure dressing.  With all of these interventions over approximately a 90 minute time.  The patient has stabilized.  No further active " "bleeding within the right groin.  Patient is now on 0.2 of Levophed with stable blood pressure and vital signs although still tachycardic.  Repeat CBC unchanged  Patient's mentation has somewhat decreased he is now somewhat altered he is arousable he will mumble and answer to simple questions and was at this time that was noted that the patient is no moving his right upper and right lower extremity."    A repeat CTH was obtained and showed L ICH.  The patient was transferred to [facility] for higher level of care, neurosurgery evaluation.  On arrival to this facility the patient is intubated on fentanyl and levophed.  The fentanyl was discontinued on arrival.  He has swelling to the R groin.  He is currently unresponsive.  The patient is admitted to [facility].            Past Medical History:   Diagnosis Date    A-fib     Anticoagulant long-term use     Coronary artery disease     Diabetes mellitus     no meds    Digestive disorder     Hypertension     Paroxysmal atrial fibrillation     Renal disorder     kidney stone    Sleep apnea     c pap machine used    Stroke     tia    Unspecified glaucoma      Past Surgical History:   Procedure Laterality Date    CARDIOVERSION      CYSTOSCOPY      EYE SURGERY      JOINT REPLACEMENT Bilateral     TRANSESOPHAGEAL ECHOCARDIOGRAPHY N/A 11/3/2022    Procedure: ECHOCARDIOGRAM, TRANSESOPHAGEAL;  Surgeon: Ahmet Bowers MD;  Location: [facility] CATH;  Service: Cardiology;  Laterality: N/A;     Family History   Problem Relation Age of Onset    Arthritis Mother     Heart disease Mother     Diabetes Mother      Social History     Tobacco Use    Smoking status: Never   Substance Use Topics    Alcohol use: No    Drug use: No     Review of patient's allergies indicates:  No Known Allergies    Medications: I have reviewed the current medication administration record.    Medications Prior to Admission   Medication Sig Dispense Refill Last Dose    amoxicillin (AMOXIL) " 500 MG capsule Take 500 mg by mouth 3 (three) times daily. With dental procedures       aspirin 81 MG Chew Take 1 tablet (81 mg total) by mouth once daily.  0     atorvastatin (LIPITOR) 40 MG tablet Take 40 mg by mouth once daily.       cholecalciferol, vitamin D3, (VITAMIN D3) 50 mcg (2,000 unit) Cap capsule Take 2,000 Units by mouth once daily.       clopidogreL (PLAVIX) 75 mg tablet Take 1 tablet (75 mg total) by mouth once daily. 30 tablet 11     metoprolol tartrate (LOPRESSOR) 50 MG tablet Take 25 mg by mouth 2 (two) times daily.       multivit-min/iron/folic/vit K1 (CENTRUM CHEWABLES ORAL) Take by mouth.       NON FORMULARY MEDICATION Prevagen- 1 tab daily       pantoprazole (PROTONIX) 40 MG tablet Take 1 tablet (40 mg total) by mouth once daily.       propafenone (RHTHYMOL) 150 MG Tab Take 150 mg by mouth every 8 (eight) hours.       ramipriL (ALTACE) 10 MG capsule Take 10 mg by mouth once daily.       ramipriL (ALTACE) 5 MG capsule Take 2 capsules (10 mg total) by mouth once daily. Home dose       tamsulosin (FLOMAX) 0.4 mg Cap Take 0.4 mg by mouth every evening.       travoprost, benzalkonium, (TRAVATAN) 0.004 % ophthalmic solution Place 1 drop into both eyes every evening.          Review of Systems   Unable to perform ROS: Intubated   Constitutional:  Negative for fever.   HENT:  Negative for drooling.    Eyes:  Negative for discharge and redness.   Respiratory:  Negative for cough and shortness of breath.    Cardiovascular:  Negative for leg swelling.   Gastrointestinal:  Negative for abdominal distention, diarrhea and vomiting.   Endocrine: Negative for cold intolerance and heat intolerance.   Genitourinary:  Negative for hematuria.   Musculoskeletal:  Positive for gait problem. Negative for neck stiffness.   Skin:  Positive for wound. Negative for rash.   Allergic/Immunologic: Negative for environmental allergies and food allergies.   Neurological:  Positive for speech difficulty and  weakness.   Psychiatric/Behavioral:  Positive for confusion.    Objective:     Vital Signs (Most Recent):  Temp: 98.8 °F (37.1 °C) (11/05/22 0715)  Pulse: (!) 111 (11/05/22 0715)  Resp: 18 (11/05/22 0715)  BP: (!) 163/71 (11/05/22 0645)  SpO2: 100 % (11/05/22 0715)    Vital Signs Range (Last 24H):  Temp:  [96.3 °F (35.7 °C)-99.5 °F (37.5 °C)]   Pulse:  []   Resp:  [10-35]   BP: ()/()   SpO2:  [93 %-100 %]   Arterial Line BP: (106-144)/(39-61)     Physical Exam  Vitals and nursing note reviewed.   Constitutional:       Appearance: He is obese. He is ill-appearing.      Interventions: He is intubated.   HENT:      Head: Normocephalic and atraumatic.      Right Ear: External ear normal.      Left Ear: External ear normal.      Nose: Nose normal.      Mouth/Throat:      Mouth: Mucous membranes are moist.   Eyes:      General: No scleral icterus.        Right eye: No discharge.         Left eye: No discharge.      Conjunctiva/sclera: Conjunctivae normal.   Cardiovascular:      Rate and Rhythm: Tachycardia present. Rhythm irregular.   Pulmonary:      Effort: He is intubated.      Breath sounds: Normal breath sounds.   Abdominal:      General: There is no distension.      Palpations: Abdomen is soft.   Musculoskeletal:      Cervical back: Neck supple.      Right lower leg: No edema.      Left lower leg: No edema.   Skin:     General: Skin is warm and dry.       Neurological Exam:   LOC: comatose  Language: Intubated      Laboratory:  CMP:   Recent Labs   Lab 11/04/22  2131   CALCIUM 8.2*   ALBUMIN 3.3*   PROT 6.1*   *   K 4.0   CO2 23      BUN 11   CREATININE 0.66*   ALKPHOS 244*   ALT 22   AST 33   BILITOT 1.2     CBC:   Recent Labs   Lab 11/05/22  0128   WBC 22.20*   RBC 3.38*   HGB 10.6*   HCT 31.5*      MCV 93   MCH 31.2*   MCHC 33.5     Lipid Panel:   Recent Labs   Lab 11/05/22  0632   CHOL 70*   LDLCALC 27.6*   HDL 26*   TRIG 82     Coagulation:   Recent Labs   Lab 11/05/22  0632    INR 1.3*   APTT 27.8     Hgb A1C: No results for input(s): HGBA1C in the last 168 hours.  TSH: No results for input(s): TSH in the last 168 hours.    Diagnostic Results:      Brain imaging:    CTH 11/5/2022 obtained at OSH.  Radiology read pending.  L ICH.    CTH 11/4/2022  obtained at OSH.  Radiology read pending.  Vessel Imaging:  CTA Head 11/4/2022 obtained at OSH.  Radiology read pending.    CTA Neck 11/4/2022 obtained at OSH.  Radiology read pending.    Cardiac Evaluation:   TTE 11/4/2022   Final Impressions   1. The left ventricle is normal in size. Global left ventricular   systolic function is normal. The left ventricular ejection fraction is   65%.     2. The right ventricle is mildly enlarged.   3. The left atrium is severely enlarged.   4. Trace mitral regurgitation.   5. Mild (1+) aortic regurgitation.   6. Mild (1+) tricuspid regurgitation.   7. The pericardium is normal in appearance.   8. trace pericardial effusion       Shaylee Son, DEIRDRE, NP  [facility]  Department of Vascular Neurology   [facility] - Neuro Critical Care

## 2022-11-05 NOTE — NURSING
[facility] personnel left fentanyl infusion with patient. This RN measured fentanyl with PURVI Hernandez; totaling 250cc fentanyl wasted.

## 2022-11-05 NOTE — PT/OT/SLP PROGRESS
Speech Language Pathology  Discharge    Luis Sorto Jr.  MRN: [id number]    Patient not seen today secondary to Other (Pt currently intubated). Please re-consult once pt extubated and appropriate for SLP services.

## 2022-11-05 NOTE — ASSESSMENT & PLAN NOTE
76 year old man history of MCA stroke following LAAE found to have postop hematoma in right groin, ultrasound findings consistent with pseudoaneurysm    OR for emergent right groin exploration, psuedoaneurysm repair  NPO  Ancef  Consent obtained from patients wife    Earnest Aparicio  Vascular Surgery Fellow, PGY-6  [phone]

## 2022-11-05 NOTE — PLAN OF CARE
POC reviewed with Luis Sorto Jr. and family at 1400. Pt unable to verbalize but family verbalized understanding. Questions and concerns addressed. No acute events today. Pt progressing toward goals. Will continue to monitor. See below and flowsheets for full assessment and VS info.     - CT completed.   - US of R groin completed.   - Echocardiogram completed.   - Left venous line okay to use.   - Lactic acids trending.   - Pt on insulin gtt.   - Pt went to surgery for repair of R femoral artery pseudoaneurysm repair.     Neuro:  Dunia Coma Scale  Best Eye Response: 2-->(E2) to pain  Best Motor Response: 4-->(M4) withdraws from pain  Best Verbal Response: 1-->(V1) none  Dunia Coma Scale Score: 7  Assessment Qualifiers: patient intubated        24 hr Temp:  [96.3 °F (35.7 °C)-99.1 °F (37.3 °C)]     CV:   Rhythm: atrial rhythm  BP goals:   SBP < 140  MAP > 65    Resp:   O2 Device (Oxygen Therapy): ventilator  Vent Mode: A/C  Set Rate: 14 BPM  Oxygen Concentration (%): 50  Vt Set: 500 mL  PEEP/CPAP: 5 cmH20    Plan: wean to extubate    GI/:     Diet/Nutrition Received: NPO  Last Bowel Movement: 11/04/22  Voiding Characteristics: urethral catheter (bladder)    Intake/Output Summary (Last 24 hours) at 11/5/2022 1703  Last data filed at 11/5/2022 1501  Gross per 24 hour   Intake 1408.05 ml   Output 555 ml   Net 853.05 ml          Labs/Accuchecks:  Recent Labs   Lab 11/05/22  0632 11/05/22  0803   WBC 14.86*  --    RBC 2.93*  --    HGB 9.2*  --    HCT 29.0* 23*     --       Recent Labs   Lab 11/05/22  0632 11/05/22  0839     --    K 3.9 3.5   CO2 15*  --      --    BUN 13  --    CREATININE 1.3  --    ALKPHOS 234*  --    ALT 11  --    AST 17  --    BILITOT 2.4*  --       Recent Labs   Lab 11/05/22  0632   INR 1.3*   APTT 27.8      Recent Labs   Lab 11/05/22  0632 11/05/22  1201     --    CPKMB 2.4  --    TROPONINI 0.141* 0.094*   MB 2.1  --        Electrolytes: N/A - electrolytes  WDL  Accuchecks: Q1H    Gtts:   fentanyl 50 mcg/hr (11/05/22 1201)    insulin regular 1 units/mL infusion orderable (DKA) 1.7 Units/hr (11/05/22 1620)    phenylephrine 0.2 mcg/kg/min (11/05/22 1654)       LDA/Wounds:  Lines/Drains/Airways       Drain  Duration                  NG/OG Tube 11/05/22 0333 orogastric 18 Fr. Center mouth <1 day         Urethral Catheter 11/05/22 0630 Non-latex 16 Fr. <1 day              Airway  Duration                  Airway - Non-Surgical 11/05/22 0328 <1 day              Peripheral Intravenous Line  Duration                  Peripheral IV - Single Lumen 11/04/22 2153 18 G Left Hand <1 day         Peripheral IV - Single Lumen 11/05/22 18 G Anterior;Proximal;Right Forearm <1 day         Peripheral IV - Single Lumen 11/05/22 20 G Anterior;Right Upper Arm <1 day                  Wounds: Yes  Wound care consulted: No    Is this a stroke patient? Yes, stroke booklet remains at the bedside for ongoing education of patient and family.       Problem: Adult Inpatient Plan of Care  Goal: Plan of Care Review  Outcome: Ongoing, Progressing     Problem: Adult Inpatient Plan of Care  Goal: Patient-Specific Goal (Individualized)  Description: Admit Date: 11/5/2022    <principal problem not specified>    Past Medical History:  No date: A-fib  No date: Anticoagulant long-term use  No date: Coronary artery disease  No date: Diabetes mellitus      Comment:  no meds  No date: Digestive disorder  No date: Hypertension  No date: Paroxysmal atrial fibrillation  No date: Renal disorder      Comment:  kidney stone  No date: Sleep apnea      Comment:  c pap machine used  No date: Stroke      Comment:  tia  No date: Unspecified glaucoma    Past Surgical History:  No date: CARDIOVERSION  No date: CYSTOSCOPY  No date: EYE SURGERY  No date: JOINT REPLACEMENT; Bilateral  11/3/2022: TRANSESOPHAGEAL ECHOCARDIOGRAPHY; N/A      Comment:  Procedure: ECHOCARDIOGRAM, TRANSESOPHAGEAL;  Surgeon:                Ahmet SIMMS  MD Elissa;  Location: [facility] CATH;  Service:                Cardiology;  Laterality: N/A;    Individualization:   1. Bear hugger     Restraints:   Restraint Order  Length of Order: Order good for next 24 hours or when removed.  Date that the current order will : 22  Time that the current order will : 704  Order Upon Application: Yes         Outcome: Ongoing, Progressing     Problem: Restraint, Nonbehavioral (Nonviolent)  Goal: Absence of Harm or Injury  Outcome: Ongoing, Progressing     Problem: Respiratory Compromise (Stroke, Ischemic/Transient Ischemic Attack)  Goal: Effective Oxygenation and Ventilation  Outcome: Ongoing, Progressing     Problem: Communication Impairment (Mechanical Ventilation, Invasive)  Goal: Effective Communication  Outcome: Ongoing, Progressing     Problem: Infection  Goal: Absence of Infection Signs and Symptoms  Outcome: Ongoing, Progressing     Problem: Diabetes Comorbidity  Goal: Blood Glucose Level Within Targeted Range  Outcome: Ongoing, Progressing     Problem: Skin Injury Risk Increased  Goal: Skin Health and Integrity  Outcome: Ongoing, Progressing

## 2022-11-05 NOTE — HPI
76M h/o AFib on Eliquis,CAD, HTN, DM II, LAAO w/Amulet procedure on 11/04/2022, who presents as a transfer from [facility] s/p [facility] for evaluation of L MCA stroke w/ICH. Patient was LKN around 2000.  He had been discharged home s/p LAAO w/Amulet device on 11/4/2022 around 1000 am. He acutely developed confusion with difficulty walking and standing, difficulty conversing, and difficulty finding his bathroom.  He presented to [facility] ED where a CTH was obtained and was negative for acute findings.Telestroke rec chemical thrombolytic; administered as the patient had been off of Eliquis >24h. Of note, per the ED record patient was noted to have increased pain and swelling in the right groin in the area of the venous access from his Cardiology procedure and then the patient began to have bleeding and bruising in the anterior thigh on he right side. Patient became hemodynamically unstable and was started on Levophed. CTH with multifocal left ICH. CTA negative for vascular lesions. Patient was intubated at OSH. Cryo was ordered at the outside hospital but not given. On arrival to [facility], CTH repeat with occipital ICH component decompressing into occipital horn. No HCP. Bleeds grossly stable.

## 2022-11-05 NOTE — ASSESSMENT & PLAN NOTE
LMCA s/p Tenecteplase with new Left ICH   --AFib (on Eliquis),  -- s/p LAAO w/Amulet procedure on 11/04/2022  s/p TNKase  --Continue Neuro checks q 1hr  -- Vascular Neurology consulted  -- NIH 22  -- Neurosurgery consulted  -- CT Head STAT ordered  -- SBP goal <140 (Now with new Left ICH)  -- PT/OT/Speech  -- Atorvastin 40 mg  -- Pending further recommendations per NGSY and Stroke

## 2022-11-05 NOTE — ASSESSMENT & PLAN NOTE
-On imaging obtained prior to transfer.  Admitted to [facility].  -Neurosurgery consulted   -See L MCA stroke for full plan.

## 2022-11-05 NOTE — OP NOTE
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 Two 15-English round abe drains, lateral is deep, medial is superficial. Wound closed with 2-0 and 3-0 vicryl sutures and skin closed with 3-0 nylon and skin staples. Prevena left in place.      Estimated Blood Loss: 550 mL         Specimens:   Specimen (24h ago, onward)        None       Procedure Details:   The patient was brought to the operating room, placed supine on the operating table. He was already intubated and sedated. The right groin was prepped and draped in the usual sterile fahion. Ancef was given. A surgical timeout was performed confirming patient identify, procedure, and laterality. We made a vertical incision overlying the common femoral artery. This was carried down through subcutaneous fat and we encountered large amount of hematoma and obscured fat planes. Our dissection was carried down through the hematoma and the femoral artery was identified and dissected on medial and lateral borders distally. We could palpate the pseudoaneurysm pulsating and took care to stay away from this area. Once we had control distally, we turned our attention to proximal control. The pannus and abdominal wall were retracted cephalad, the inguinal ligament was partially divided with electrocautery. We dissected onto the external iliac artery  anteriorly. There was a small arteriotomy that was made from dissection that was repaired with 5-0 prolene figure of eight suture. We Dissected the artery medially and laterally. We heparinized with 5000 units heparin. After 3 minutes of circulation, the artery was clamped proximally and vessel loop tightened distal. The pseudoaneurysm was opened revealing a approximately 1cm hole anteriorly in the vessel wall. This was repaired with 3 figure of eight 5-0 prolene sutures. The wall was hemostatic. Clamps were removed, the patient was reversed with 35mg protamine. Once hemostasis was secured, the wound was closed in layers with 2-0 vicryl, 3-0 vicryl, and 3-0 ny7lon. Skin staples were placed between nylon sutures. Two drains were left in place, lateral drain is deep and medial drain is superficial. A prevena device was placed on the skin.     Instrument and sponge counts were correct x2.    The patient tolerated the procedure well, he was given 2 units of blood during the case, and was transported back to the ICU in good condition intubated.     Dr. Turner was present for the entire procedure.    Earnest Aparicio  Vascular Surgery Fellow, PGY-6  [phone]

## 2022-11-05 NOTE — TRANSFER OF CARE
"Anesthesia Transfer of Care Note    Patient: Luis Sorto Jr.    Procedure(s) Performed: Procedure(s) (LRB):  REPAIR, PSEUDOANEURYSM, ARTERY, FEMORAL (Right)    Patient location: ICU    Anesthesia Type: general    Transport from OR: Transported from OR intubated on 100% O2 by AMBU with assisted ventilation. Upon arrival to PACU/ICU, patient attached to ventilator and auscultated to confirm bilateral breath sounds and adequate TV. Continuous ECG monitoring in transport. Continuous SpO2 monitoring in transport. Continuos invasive BP monitoring in transport. Continuous CVP monitoring in transport    Post pain: adequate analgesia    Post assessment: no apparent anesthetic complications    Post vital signs: stable    Level of consciousness: sedated    Nausea/Vomiting: no nausea/vomiting    Complications: none          Last vitals:   Visit Vitals  BP (!) 95/57 (BP Location: Left leg, Patient Position: Lying)   Pulse 98   Temp 37.3 °C (99.1 °F) (Core Bladder)   Resp 14   Ht 5' 9" (1.753 m)   Wt 116.6 kg (257 lb)   SpO2 (!) 90%   BMI 37.95 kg/m²     "

## 2022-11-05 NOTE — H&P
[facility]  Neurocritical Care  History & Physical    Admit Date: 11/5/2022  Service Date: 11/05/2022  Length of Stay: 0    Subjective:     Chief Complaint: Cerebrovascular accident (CVA) due to embolism of left middle cerebral artery    History of Present Illness: Mr. Luis Sorto Jr. is a 76 y.o. male with PMHx of AFib (on Eliquis),CAD, HTN, DM II, s/p LAAO w/Amulet procedure on 11/04/2022 who presents as a transfer from [facility] to Neuro Critical Care s/p [facility] for evaluation of L MCA stroke w/ICH. HPI information gathered from review of the patient's medical record due to the patient being intubated, no family at the bedside.  Patient was LKN around 2000.  He had been discharged home s/p LAAO w/Amulet device on 11/4/2022 around 1000 am.  He acutely developed confusion with difficulty walking and standing, difficulty conversing, and difficulty finding his bathroom.  There were no reported preceding symptoms and nothing was reported to exacerbate or alleviate the symptoms. He presented to [facility] ED where a CTH was obtained and was negative for acute findings.  Telestroke consult was performed by . TNKase was administered as the patient had been off of Eliquis >24h .  Of note, per the ED record patient was noted to have increased pain and swelling in the right groin in the area of the venous access from his Cardiology procedure and then the patient began to have bleeding and bruising in the anterior thigh on he right side. Patient became hemodynamically unstable and was started on Levophed. Outside Hospital Cardiology deparment placed an IV in the left groin for access. Patient was then stabilized. CT Head was ordered and showed Left ICH. Patient was intubated at outside hospital. Cryo was ordered at the outside hospital but not given. Patient was then transferred to [facility] Neuro [facility] for a higher level of care. On arrival to [facility]  the patient is intubated on fentanyl and levophed, NIH 22, hemodynamically unstable, with swelling to the Right groin, unresponsive, Cryo re-ordered and CT Head STAT. Patient will be admitted to Neuro ICU for a higher level of care.         Past Medical History:   Diagnosis Date    A-fib     Anticoagulant long-term use     Coronary artery disease     Diabetes mellitus     no meds    Digestive disorder     Hypertension     Paroxysmal atrial fibrillation     Renal disorder     kidney stone    Sleep apnea     c pap machine used    Stroke     tia    Unspecified glaucoma      Past Surgical History:   Procedure Laterality Date    CARDIOVERSION      CYSTOSCOPY      EYE SURGERY      JOINT REPLACEMENT Bilateral     TRANSESOPHAGEAL ECHOCARDIOGRAPHY N/A 11/3/2022    Procedure: ECHOCARDIOGRAM, TRANSESOPHAGEAL;  Surgeon: Ahmet Bowers MD;  Location: [facility] CATH;  Service: Cardiology;  Laterality: N/A;      Current Facility-Administered Medications on File Prior to Encounter   Medication Dose Route Frequency Provider Last Rate Last Admin    [COMPLETED] clopidogreL tablet 600 mg  600 mg Oral Once Dallas Gold MD   600 mg at 11/04/22 1310    [COMPLETED] iopamidoL (ISOVUE-370) injection 100 mL  100 mL Intravenous ONCE PRN Baldomero Walker MD   100 mL at 11/04/22 2313    [COMPLETED] lactated ringers bolus 1,000 mL  1,000 mL Intravenous Once Baldomero Walker MD   Stopped at 11/05/22 0301    [COMPLETED] protamine 25 mg in dextrose 5 % 50 mL IVPB  25 mg Intravenous Once Dallas Gold MD   Stopped at 11/04/22 1115    [COMPLETED] tenecteplase (TNKase) IV KIT 25 mg  25 mg Intravenous ED 1 Time Baldomero Walker MD   25 mg at 11/04/22 2239    [DISCONTINUED] 0.9%  NaCl infusion (for blood administration)   Intravenous Q24H PRN Baldomero Walker MD        [DISCONTINUED] 0.9%  NaCl infusion (for blood administration)   Intravenous Q24H PRN Baldomero Walker MD        [DISCONTINUED] 0.9%  NaCl infusion (for blood administration)    Intravenous Q24H PRN Baldomero Walker MD        [DISCONTINUED] 0.9%  NaCl infusion   Intravenous Continuous PRN Roe PTimmy Pizarro, CRNA   New Bag at 11/04/22 0822    [DISCONTINUED] acetaminophen tablet 650 mg  650 mg Oral Q4H PRN Dallas Gold MD        [DISCONTINUED] amoxicillin capsule 500 mg  500 mg Oral TID Dallas Gold MD        [DISCONTINUED] aspirin chewable tablet 81 mg  81 mg Oral Daily Dallas Gold MD        [DISCONTINUED] aspirin EC tablet 81 mg  81 mg Oral Daily Dallas Gold MD   81 mg at 11/04/22 1310    [DISCONTINUED] atorvastatin tablet 40 mg  40 mg Oral Daily Dallas Gold MD        [DISCONTINUED] cholecalciferol (vitamin D3) capsule/tablet 2,000 Units  2,000 Units Oral Daily Dallas Gold MD        [DISCONTINUED] clopidogreL tablet 75 mg  75 mg Oral Daily Dallas Gold MD        [DISCONTINUED] clopidogreL tablet 75 mg  75 mg Oral Daily Dallas Gold MD        [DISCONTINUED] diphenhydrAMINE injection 25 mg  25 mg Intravenous Q6H PRN Roe PTimmy Pizarro, CRNA        [DISCONTINUED] diphenhydrAMINE injection 25 mg  25 mg Intravenous Q6H PRN Roe PTimmy Pizarro, CRNA        [DISCONTINUED] fentaNYL (SUBLIMAZE) 2,500 mcg in dextrose 5 % 250 mL infusion  0-250 mcg/hr Intravenous Continuous Baldomero Walker MD 15 mL/hr at 11/05/22 0342 150 mcg/hr at 11/05/22 0342    [DISCONTINUED] fentaNYL 50 mcg/mL injection   Intravenous PRN Ore PTimmy Pizarro, CRNA   50 mcg at 11/04/22 0754    [DISCONTINUED] heparin (porcine) injection   Intravenous PRN Roe PTimmy Pizarro, CRNA   5,000 Units at 11/04/22 0837    [DISCONTINUED] HYDROmorphone injection 0.2 mg  0.2 mg Intravenous Q5 Min PRN Roe P. Juarez, CRNA        [DISCONTINUED] HYDROmorphone injection 0.2 mg  0.2 mg Intravenous Q5 Min PRN Roe PTimmy Pizarro, CRNA        [DISCONTINUED] lactated ringers infusion   Intravenous Continuous Kaleb Buckley Jr., MD   Paused at 11/04/22 0930    [DISCONTINUED] LIDOcaine (PF) 10 mg/ml (1%) injection    Intravenous PRN Roe P. Juarez, CRNA   5 mL at 11/04/22 0732    [DISCONTINUED] lisinopriL tablet 10 mg  10 mg Oral Daily Dallas Gold MD        [DISCONTINUED] lisinopriL tablet 10 mg  10 mg Oral Daily Dallas Gold MD        [DISCONTINUED] meperidine (PF) injection 12.5 mg  12.5 mg Intravenous Q10 Min PRN Roe P. Juarez, CRNA        [DISCONTINUED] metoprolol tartrate (LOPRESSOR) tablet 25 mg  25 mg Oral BID Dallas Gold MD        [DISCONTINUED] midazolam (VERSED) 1 mg/mL injection 2 mg  2 mg Intravenous ED 1 Time Baldomero Walker MD        [DISCONTINUED] midazolam (VERSED) 5 mg/mL injection 2 mg  2 mg Intravenous Once Baldomero Walker MD        [DISCONTINUED] NORepinephrine 4 mg in dextrose 5% 250 mL infusion (premix) (titrating)  0-3 mcg/kg/min Intravenous Continuous Baldomero Walker MD 4.1 mL/hr at 11/05/22 0416 0.01 mcg/kg/min at 11/05/22 0416    [DISCONTINUED] ondansetron injection 4 mg  4 mg Intravenous Daily PRN Roe P. Juarez, CRNA        [DISCONTINUED] ondansetron injection 4 mg  4 mg Intravenous Daily PRN Roe P. Juarez, CRNA        [DISCONTINUED] ondansetron injection   Intravenous PRN Roe P. Juarez, CRNA   4 mg at 11/04/22 0722    [DISCONTINUED] oxyCODONE immediate release tablet 5 mg  5 mg Oral Q3H PRN Roe P. Juarez, CRNA        [DISCONTINUED] propafenone tablet 150 mg  150 mg Oral Q8H Dallas Gold MD        [DISCONTINUED] propofol (DIPRIVAN) 10 mg/mL infusion   Intravenous PRN Roe P. Juarez, CRNA   60 mg at 11/04/22 0732    [DISCONTINUED] rocuronium injection   Intravenous PRN Roe P. Juarez, CRNA   10 mg at 11/04/22 0812    [DISCONTINUED] succinylcholine injection   Intravenous PRN Roe P. Juarez, CRNA   140 mg at 11/04/22 0733    [DISCONTINUED] sugammadex (BRIDION) 100 mg/mL injection   Intravenous PRN Roe P. Juarez, CRNA   200 mg at 11/04/22 0901    [DISCONTINUED] tamsulosin 24 hr capsule 0.4 mg  0.4 mg Oral QHS Dallas SOLIMAN  MD Alla        [DISCONTINUED] vancomycin (VANCOCIN) 1,000 mg in dextrose 5 % 250 mL IVPB  1,000 mg Intravenous On Call Procedure Dallas JANNY Gold MD   1,000 mg at 11/04/22 0746     Current Outpatient Medications on File Prior to Encounter   Medication Sig Dispense Refill    amoxicillin (AMOXIL) 500 MG capsule Take 500 mg by mouth 3 (three) times daily. With dental procedures      aspirin 81 MG Chew Take 1 tablet (81 mg total) by mouth once daily.  0    atorvastatin (LIPITOR) 40 MG tablet Take 40 mg by mouth once daily.      cholecalciferol, vitamin D3, (VITAMIN D3) 50 mcg (2,000 unit) Cap capsule Take 2,000 Units by mouth once daily.      clopidogreL (PLAVIX) 75 mg tablet Take 1 tablet (75 mg total) by mouth once daily. 30 tablet 11    metoprolol tartrate (LOPRESSOR) 50 MG tablet Take 25 mg by mouth 2 (two) times daily.      multivit-min/iron/folic/vit K1 (CENTRUM CHEWABLES ORAL) Take by mouth.      NON FORMULARY MEDICATION Prevagen- 1 tab daily      pantoprazole (PROTONIX) 40 MG tablet Take 1 tablet (40 mg total) by mouth once daily.      propafenone (RHTHYMOL) 150 MG Tab Take 150 mg by mouth every 8 (eight) hours.      ramipriL (ALTACE) 10 MG capsule Take 10 mg by mouth once daily.      ramipriL (ALTACE) 5 MG capsule Take 2 capsules (10 mg total) by mouth once daily. Home dose      tamsulosin (FLOMAX) 0.4 mg Cap Take 0.4 mg by mouth every evening.      travoprost, benzalkonium, (TRAVATAN) 0.004 % ophthalmic solution Place 1 drop into both eyes every evening.        Allergies: Patient has no known allergies.    Family History   Problem Relation Age of Onset    Arthritis Mother     Heart disease Mother     Diabetes Mother      Social History     Tobacco Use    Smoking status: Never   Substance Use Topics    Alcohol use: No    Drug use: No     Review of Systems  Review of symptoms: Unable to determine due to LOC  Objective:     Vitals:    Temp: 98.8 °F (37.1 °C)  Pulse: (!) 111  BP: (!) 163/71  MAP (mmHg): 102  Resp:  18  SpO2: 100 %  Oxygen Concentration (%): 50  O2 Device (Oxygen Therapy): ventilator  Vent Mode: A/C  Set Rate: 14 BPM  Vt Set: 500 mL  PEEP/CPAP: 5 cmH20  Peak Airway Pressure: 18 cmH2O  Mean Airway Pressure: 8.8 cmH20  Plateau Pressure: 0 cmH20    Temp  Min: 96.3 °F (35.7 °C)  Max: 99.5 °F (37.5 °C)  Pulse  Min: 71  Max: 149  BP  Min: 65/28  Max: 177/74  MAP (mmHg)  Min: 40  Max: 112  Resp  Min: 10  Max: 35  SpO2  Min: 93 %  Max: 100 %  Oxygen Concentration (%)  Min: 50  Max: 50    11/04 0701 - 11/05 0700  In: -   Out: 100 [Urine:100]           Physical Exam    Physical Exam:  GA: Intubated, Does not follow commands,comfortable, no acute distress.   HEENT: No scleral icterus or JVD.   Pulmonary: Clear to auscultation Anterior. No wheezing, crackles, or rhonchi.  Cardiac: RRR S1 & S2 w/o rubs/murmurs/gallops.   Abdominal: Bowel sounds present x 4. No appreciable hepatosplenomegaly.  Skin: + Right Grown swelling and continued expansion; sand-bag and manual pressure applied  Neuro:  --GCS: E2 VT M4  --Mental Status:  Intubated, Does not follow commands,   --CN II-XII unable to fully assess due to LOC  --Pupils 2-3mm, PERRL.   --Corneal reflex, gag, cough intact.  --LUE strength: Spontaneous movement  --RUE strength: Extensor posturing   --LLE strength: minimal withdrawal to pain   --RLE strength: minimal withdrawal to pain   Unable to test coordination, gait due to level of consciousness.    Today I personally reviewed pertinent medications, lines/drains/airways, imaging, laboratory results, notably:      Current NIH Stroke Score Values      Flowsheet Row Most Recent Value   Interval baseline   1a. Level of Consciousness 3-->Responds only with reflex motor or autonomic effects or totally unresponsive, flaccid, and areflexic   1b. LOC Questions 2-->Answers neither question correctly   1c. LOC Commands 2-->Performs neither task correctly   2. Best Gaze 1-->Partial gaze palsy, gaze is abnormal in one or both eyes, but  forced deviation or total gaze paresis is not present   3. Visual 0-->No visual loss   4. Facial Palsy 0-->Normal symmetrical movements   5a. Motor Arm, Left 3-->No effort against gravity, limb falls   5b. Motor Arm, Right 3-->No effort against gravity, limb falls   6a. Motor Leg, Left 3-->No effort against gravity, leg falls to bed immediately   6b. Motor Leg, Right 0-->No drift, leg holds 30 degree position for full 5 secs   7. Limb Ataxia 0-->Absent   8. Sensory 0-->Normal, no sensory loss   9. Best Language 3-->Mute, global aphasia, no usable speech or auditory comprehension   10. Dysarthria (UN) Intubated or other physical barrier   11. Extinction and Inattention (formerly Neglect) 2-->Profound tania-inattention/extinction more than 1 modality   Total (NIH Stroke Scale) 22              Assessment/Plan:     Neuro  * Cerebrovascular accident (CVA) due to embolism of left middle cerebral artery  LMCA s/p Tenecteplase with new Left ICH   --AFib (on Eliquis),  -- s/p LAAO w/Amulet procedure on 11/04/2022  s/p TNKase  --Continue Neuro checks q 1hr  -- Vascular Neurology consulted  -- Neurosurgery consulted  -- CT Head STAT ordered  -- SBP goal <140 (Now with new Left ICH)  -- PT/OT/Speech  -- Atorvastin 40 mg  -- Cryo re-ordered STAT as it was not given at outside hospital  -- Pending further recommendations per NGSY and Stroke     Left-sided nontraumatic intracerebral hemorrhage  LMCA s/p Tenecteplase with new Left ICH   --AFib (on Eliquis),  -- s/p LAAO w/Amulet procedure on 11/04/2022  s/p TNKase  --Continue Neuro checks q 1hr  -- Vascular Neurology consulted  -- NIH 22  -- Neurosurgery consulted  -- CT Head STAT ordered  -- SBP goal <140 (Now with new Left ICH)  -- PT/OT/Speech  -- Atorvastin 40 mg  -- Cryo re-ordered STAT as it was not given at outside hospital  -- Pending further recommendations per NGSY and Stroke     Cardiac/Vascular  Hyperlipidemia  -- Lipid panel pending  -- Atorvastatin 40 mg  daily    Persistent atrial fibrillation  --AFib (on Eliquis), stopped prior to procedure LAAO w/Amulet procedure on 11/04/2022   -- 5mg IV Lopressor upon admission       Endocrine  Diabetes mellitus  Diabetes Mellitus Type II  -- Continue insulin gtt  -- +  -- POCT q 4  -- HgbA1c pending      GI  Groin swelling  -- s/p LAAO w/Amulet procedure on 11/04/2022  s/p TNKase  -- Groin swelling continuous to increase in size  -- Sand bags and davin pressure applied   -- Vascular consulted          The patient is being Prophylaxed for:  Venous Thromboembolism with: Mechanical  Stress Ulcer with: H2B  Ventilator Pneumonia with: chlorhexidine oral care    Activity Orders            Turn patient starting at 11/05 0800    Elevate HOB starting at 11/05 0623    Diet NPO: NPO starting at 11/05 0623          Full Code         Uninterrupted Critical Care/Counseling Time (not including procedures):    I spent spent > 35 minutes reviewing patient records, examining, and [place] of the patient with greater than 50% of the time spent with direct patient care and coordination.       Cally Michael PA-C  Neurocritical Care  [facility]

## 2022-11-05 NOTE — BRIEF OP NOTE
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 Two 15-Hungarian round abe drains, lateral is deep, medial is superficial. Wound closed with 2-0 and 3-0 vicryl sutures and skin closed with 3-0 nylon and skin staples. Prevena left in place.     Estimated Blood Loss: 550 mL         Specimens:   Specimen (24h ago, onward)      None            [id number]

## 2022-11-05 NOTE — ASSESSMENT & PLAN NOTE
-S/p RAUDEL lake/medardo on 11/04/2022.  -Taken off of Eliquis prior to the procedure.  Recommended for DAPT post procedure by his Cardiology team.

## 2022-11-05 NOTE — HPI
Mr. Luis Sorto Jr. is a 76 y.o. male with PMHx of AFib (on Eliquis),CAD, HTN, DM II, s/p LAAO w/Amulet procedure on 11/04/2022 who presents as a transfer from [facility] to Neuro Critical Care s/p [facility] for evaluation of L MCA stroke w/ICH. HPI information gathered from review of the patient's medical record due to the patient being intubated, no family at the bedside.  Patient was LKN around 2000.  He had been discharged home s/p LAAO w/Amulet device on 11/4/2022 around 1000 am.  He acutely developed confusion with difficulty walking and standing, difficulty conversing, and difficulty finding his bathroom.  There were no reported preceding symptoms and nothing was reported to exacerbate or alleviate the symptoms. He presented to [facility] ED where a CTH was obtained and was negative for acute findings.  Telestroke consult was performed by . TNKase was administered as the patient had been off of Eliquis >24h .  Of note, per the ED record patient was noted to have increased pain and swelling in the right groin in the area of the venous access from his Cardiology procedure and then the patient began to have bleeding and bruising in the anterior thigh on he right side. Patient became hemodynamically unstable and was started on Levophed. Outside Hospital Cardiology deparment placed an IV in the left groin for access. Patient was then stabilized. CT Head was ordered and showed Left ICH. Patient was intubated at outside hospital. Cryo was ordered at the outside hospital but not given. Patient was then transferred to [facility] Neuro [facility] for a higher level of care. On arrival to [facility] the patient is intubated on fentanyl and levophed, NIH 22, hemodynamically unstable, with swelling to the Right groin, unresponsive, Cryo re-ordered and CT Head STAT. Patient will be admitted to Neuro ICU for a higher level of care.

## 2022-11-05 NOTE — ASSESSMENT & PLAN NOTE
Luis Sorto Jr. is a 76 y.o. male with significant medical history of AFib (on Eliquis),CAD, HTN, DM II, s/p LAAO w/Amulet procedure on 11/04/2022 presented to hospital as a transfer from [facility] for evaluation of L MCA stroke w/ICH.  He received TNKase.    Antithrombotics for secondary stroke prevention: Antiplatelets: None: Intracerebral Hemorrhage    Statins for secondary stroke prevention and hyperlipidemia, if present:   Statins: Atorvastatin- 40 mg daily    Aggressive risk factor modification: HTN, DM, HLD, Obesity, A-Fib     Rehab efforts: The patient has been evaluated by a stroke team provider and the therapy needs have been fully considered based off the presenting complaints and exam findings. The following therapy evaluations are needed: Therapy evals when the patient is able to participate    Diagnostics ordered/pending: CT scan of head without contrast to asses brain parenchyma, HgbA1C to assess blood glucose levels, Lipid Profile to assess cholesterol levels, TSH to assess thyroid function    VTE prophylaxis: Mechanical prophylaxis: Place SCDs  None: Reason for No Pharmacological VTE Prophylaxis: History of systemic or intracranial bleeding    BP parameters: SBP<140; Avoid hypotension.

## 2022-11-05 NOTE — SUBJECTIVE & OBJECTIVE
Past Medical History:   Diagnosis Date    A-fib     Anticoagulant long-term use     Coronary artery disease     Diabetes mellitus     no meds    Digestive disorder     Hypertension     Paroxysmal atrial fibrillation     Renal disorder     kidney stone    Sleep apnea     c pap machine used    Stroke     tia    Unspecified glaucoma      Past Surgical History:   Procedure Laterality Date    CARDIOVERSION      CYSTOSCOPY      EYE SURGERY      JOINT REPLACEMENT Bilateral     TRANSESOPHAGEAL ECHOCARDIOGRAPHY N/A 11/3/2022    Procedure: ECHOCARDIOGRAM, TRANSESOPHAGEAL;  Surgeon: Ahmet Bowers MD;  Location: [facility] [place];  Service: Cardiology;  Laterality: N/A;      Current Facility-Administered Medications on File Prior to Encounter   Medication Dose Route Frequency Provider Last Rate Last Admin    [COMPLETED] clopidogreL tablet 600 mg  600 mg Oral Once Dallas Gold MD   600 mg at 11/04/22 1310    [COMPLETED] iopamidoL (ISOVUE-370) injection 100 mL  100 mL Intravenous ONCE PRN Baldomero Walker MD   100 mL at 11/04/22 2313    [COMPLETED] lactated ringers bolus 1,000 mL  1,000 mL Intravenous Once Baldomero Walker MD   Stopped at 11/05/22 0301    [COMPLETED] protamine 25 mg in dextrose 5 % 50 mL IVPB  25 mg Intravenous Once Dallas Gold MD   Stopped at 11/04/22 1115    [COMPLETED] tenecteplase (TNKase) IV KIT 25 mg  25 mg Intravenous ED 1 Time Baldomero Walker MD   25 mg at 11/04/22 2239    [DISCONTINUED] 0.9%  NaCl infusion (for blood administration)   Intravenous Q24H PRN Baldomero Walker MD        [DISCONTINUED] 0.9%  NaCl infusion (for blood administration)   Intravenous Q24H PRN Baldomero Walker MD        [DISCONTINUED] 0.9%  NaCl infusion (for blood administration)   Intravenous Q24H PRN Baldomero Walker MD        [DISCONTINUED] 0.9%  NaCl infusion   Intravenous Continuous PRN Roe P. Juarez, CRNA   New Bag at 11/04/22 0822    [DISCONTINUED] acetaminophen tablet 650 mg  650 mg Oral Q4H PRN Dallas Gold MD         [DISCONTINUED] amoxicillin capsule 500 mg  500 mg Oral TID Dallas Gold MD        [DISCONTINUED] aspirin chewable tablet 81 mg  81 mg Oral Daily Dallas Gold MD        [DISCONTINUED] aspirin EC tablet 81 mg  81 mg Oral Daily Dallas Gold MD   81 mg at 11/04/22 1310    [DISCONTINUED] atorvastatin tablet 40 mg  40 mg Oral Daily Dallas Gold MD        [DISCONTINUED] cholecalciferol (vitamin D3) capsule/tablet 2,000 Units  2,000 Units Oral Daily Dallas Gold MD        [DISCONTINUED] clopidogreL tablet 75 mg  75 mg Oral Daily Dallas Gold MD        [DISCONTINUED] clopidogreL tablet 75 mg  75 mg Oral Daily Dallas Gold MD        [DISCONTINUED] diphenhydrAMINE injection 25 mg  25 mg Intravenous Q6H PRN Roe P. Juarez, CRNA        [DISCONTINUED] diphenhydrAMINE injection 25 mg  25 mg Intravenous Q6H PRN Roe P. Juarez, CRNA        [DISCONTINUED] fentaNYL (SUBLIMAZE) 2,500 mcg in dextrose 5 % 250 mL infusion  0-250 mcg/hr Intravenous Continuous Baldomero Walker MD 15 mL/hr at 11/05/22 0342 150 mcg/hr at 11/05/22 0342    [DISCONTINUED] fentaNYL 50 mcg/mL injection   Intravenous PRN Roe P. Juarez, CRNA   50 mcg at 11/04/22 0754    [DISCONTINUED] heparin (porcine) injection   Intravenous PRN Roe P. Juarez, CRNA   5,000 Units at 11/04/22 0837    [DISCONTINUED] HYDROmorphone injection 0.2 mg  0.2 mg Intravenous Q5 Min PRN Roe P. Juarez, CRNA        [DISCONTINUED] HYDROmorphone injection 0.2 mg  0.2 mg Intravenous Q5 Min PRN Roe P. Juarez, CRNA        [DISCONTINUED] lactated ringers infusion   Intravenous Continuous Kaleb Buckley Jr., MD   Paused at 11/04/22 0930    [DISCONTINUED] LIDOcaine (PF) 10 mg/ml (1%) injection   Intravenous PRN Roe P. Juarez, CRNA   5 mL at 11/04/22 0732    [DISCONTINUED] lisinopriL tablet 10 mg  10 mg Oral Daily Dallas Gold MD        [DISCONTINUED] lisinopriL tablet 10 mg  10 mg Oral Daily Dallas Gold MD        [DISCONTINUED]  meperidine (PF) injection 12.5 mg  12.5 mg Intravenous Q10 Min PRN Roe P. Juarez, CRNA        [DISCONTINUED] metoprolol tartrate (LOPRESSOR) tablet 25 mg  25 mg Oral BID Dallas Gold MD        [DISCONTINUED] midazolam (VERSED) 1 mg/mL injection 2 mg  2 mg Intravenous ED 1 Time Baldomero Walker MD        [DISCONTINUED] midazolam (VERSED) 5 mg/mL injection 2 mg  2 mg Intravenous Once Baldomero Walker MD        [DISCONTINUED] NORepinephrine 4 mg in dextrose 5% 250 mL infusion (premix) (titrating)  0-3 mcg/kg/min Intravenous Continuous Baldomero Walker MD 4.1 mL/hr at 11/05/22 0416 0.01 mcg/kg/min at 11/05/22 0416    [DISCONTINUED] ondansetron injection 4 mg  4 mg Intravenous Daily PRN Roe P. Juarez, CRNA        [DISCONTINUED] ondansetron injection 4 mg  4 mg Intravenous Daily PRN Roe P. Juarez, CRNA        [DISCONTINUED] ondansetron injection   Intravenous PRN Roe P. Juarez, CRNA   4 mg at 11/04/22 0722    [DISCONTINUED] oxyCODONE immediate release tablet 5 mg  5 mg Oral Q3H PRN Roe P. Juarez, CRNA        [DISCONTINUED] propafenone tablet 150 mg  150 mg Oral Q8H Dallas Gold MD        [DISCONTINUED] propofol (DIPRIVAN) 10 mg/mL infusion   Intravenous PRN Roe P. Juarez, CRNA   60 mg at 11/04/22 0732    [DISCONTINUED] rocuronium injection   Intravenous PRN Roe P. Juarez, CRNA   10 mg at 11/04/22 0812    [DISCONTINUED] succinylcholine injection   Intravenous PRN Roe P. Juarez, CRNA   140 mg at 11/04/22 0733    [DISCONTINUED] sugammadex (BRIDION) 100 mg/mL injection   Intravenous PRN Roe P. Juarez, CRNA   200 mg at 11/04/22 0901    [DISCONTINUED] tamsulosin 24 hr capsule 0.4 mg  0.4 mg Oral QHS Dallas Gold MD        [DISCONTINUED] vancomycin (VANCOCIN) 1,000 mg in dextrose 5 % 250 mL IVPB  1,000 mg Intravenous On Call Procedure Dallas Gold MD   1,000 mg at 11/04/22 0746     Current Outpatient Medications on File Prior to Encounter   Medication  Sig Dispense Refill    amoxicillin (AMOXIL) 500 MG capsule Take 500 mg by mouth 3 (three) times daily. With dental procedures      aspirin 81 MG Chew Take 1 tablet (81 mg total) by mouth once daily.  0    atorvastatin (LIPITOR) 40 MG tablet Take 40 mg by mouth once daily.      cholecalciferol, vitamin D3, (VITAMIN D3) 50 mcg (2,000 unit) Cap capsule Take 2,000 Units by mouth once daily.      clopidogreL (PLAVIX) 75 mg tablet Take 1 tablet (75 mg total) by mouth once daily. 30 tablet 11    metoprolol tartrate (LOPRESSOR) 50 MG tablet Take 25 mg by mouth 2 (two) times daily.      multivit-min/iron/folic/vit K1 (CENTRUM CHEWABLES ORAL) Take by mouth.      NON FORMULARY MEDICATION Prevagen- 1 tab daily      pantoprazole (PROTONIX) 40 MG tablet Take 1 tablet (40 mg total) by mouth once daily.      propafenone (RHTHYMOL) 150 MG Tab Take 150 mg by mouth every 8 (eight) hours.      ramipriL (ALTACE) 10 MG capsule Take 10 mg by mouth once daily.      ramipriL (ALTACE) 5 MG capsule Take 2 capsules (10 mg total) by mouth once daily. Home dose      tamsulosin (FLOMAX) 0.4 mg Cap Take 0.4 mg by mouth every evening.      travoprost, benzalkonium, (TRAVATAN) 0.004 % ophthalmic solution Place 1 drop into both eyes every evening.        Allergies: Patient has no known allergies.    Family History   Problem Relation Age of Onset    Arthritis Mother     Heart disease Mother     Diabetes Mother      Social History     Tobacco Use    Smoking status: Never   Substance Use Topics    Alcohol use: No    Drug use: No     Review of Systems  Review of symptoms: Unable to determine due to LOC  Objective:     Vitals:    Temp: 98.8 °F (37.1 °C)  Pulse: (!) 111  BP: (!) 163/71  MAP (mmHg): 102  Resp: 18  SpO2: 100 %  Oxygen Concentration (%): 50  O2 Device (Oxygen Therapy): ventilator  Vent Mode: A/C  Set Rate: 14 BPM  Vt Set: 500 mL  PEEP/CPAP: 5 cmH20  Peak Airway Pressure: 18 cmH2O  Mean Airway Pressure: 8.8 cmH20  Plateau Pressure: 0  cmH20    Temp  Min: 96.3 °F (35.7 °C)  Max: 99.5 °F (37.5 °C)  Pulse  Min: 71  Max: 149  BP  Min: 65/28  Max: 177/74  MAP (mmHg)  Min: 40  Max: 112  Resp  Min: 10  Max: 35  SpO2  Min: 93 %  Max: 100 %  Oxygen Concentration (%)  Min: 50  Max: 50    11/04 0701 - 11/05 0700  In: -   Out: 100 [Urine:100]           Physical Exam    Physical Exam:  GA: Intubated, Does not follow commands,comfortable, no acute distress.   HEENT: No scleral icterus or JVD.   Pulmonary: Clear to auscultation Anterior. No wheezing, crackles, or rhonchi.  Cardiac: RRR S1 & S2 w/o rubs/murmurs/gallops.   Abdominal: Bowel sounds present x 4. No appreciable hepatosplenomegaly.  Skin: + Right Grown swelling and continued expansion; sand-bag and manual pressure applied  Neuro:  --GCS: E2 VT M4  --Mental Status:  Intubated, Does not follow commands,   --CN II-XII unable to fully assess due to LOC  --Pupils 2-3mm, PERRL.   --Corneal reflex, gag, cough intact.  --LUE strength: Spontaneous movement  --RUE strength: Extensor posturing   --LLE strength: minimal withdrawal to pain   --RLE strength: minimal withdrawal to pain   Unable to test coordination, gait due to level of consciousness.    Today I personally reviewed pertinent medications, lines/drains/airways, imaging, laboratory results, notably:      Current NIH Stroke Score Values      Flowsheet Row Most Recent Value   Interval baseline   1a. Level of Consciousness 3-->Responds only with reflex motor or autonomic effects or totally unresponsive, flaccid, and areflexic   1b. LOC Questions 2-->Answers neither question correctly   1c. LOC Commands 2-->Performs neither task correctly   2. Best Gaze 1-->Partial gaze palsy, gaze is abnormal in one or both eyes, but forced deviation or total gaze paresis is not present   3. Visual 0-->No visual loss   4. Facial Palsy 0-->Normal symmetrical movements   5a. Motor Arm, Left 3-->No effort against gravity, limb falls   5b. Motor Arm, Right 3-->No effort  against gravity, limb falls   6a. Motor Leg, Left 3-->No effort against gravity, leg falls to bed immediately   6b. Motor Leg, Right 0-->No drift, leg holds 30 degree position for full 5 secs   7. Limb Ataxia 0-->Absent   8. Sensory 0-->Normal, no sensory loss   9. Best Language 3-->Mute, global aphasia, no usable speech or auditory comprehension   10. Dysarthria (UN) Intubated or other physical barrier   11. Extinction and Inattention (formerly Neglect) 2-->Profound tania-inattention/extinction more than 1 modality   Total (NIH Stroke Scale) 22

## 2022-11-05 NOTE — ANESTHESIA PREPROCEDURE EVALUATION
[facility]  Anesthesia Pre-Operative Evaluation         Patient Name/: Luis Sorto Jr., 1945  MRN: [id number]    SUBJECTIVE:     Pre-operative evaluation for Procedure(s) (LRB):  REPAIR, PSEUDOANEURYSM (Right)     2022     Luis Sorto Jr. is a 76 y.o. male w/ a significant PMHx of AFib on Eliquis, CAD, HTN, DM II, LAAO w/Amulet procedure on 2022, who presents as a transfer from [facility] for evaluation of L MCA stroke w/ICH. OSH CTH with multifocal left ICH. CTA negative for vascular lesions. Patient was intubated at OSH. Cryo was ordered at the outside hospital but not given. On arrival to [facility] [place], CTH repeat with occipital ICH component decompressing into occipital horn. No HCP. Bleeds grossly stable.     Course c/b pseudoaneurysm R femoral artery.     Patient now presents for the above procedure(s).      Prev airway:     Intubation     Date/Time: 2022 7:34 AM  Performed by: Roe Pizarro CRNA  Authorized by: Juanpablo Pruitt DO      Intubation:     Induction:  Intravenous    Intubated:  Postinduction    Mask Ventilation:  Easy mask    Attempts:  1    Attempted By:  Resident anesthesiologist    Method of Intubation:  Direct    Blade:  Glidescope 3    Laryngeal View Grade: Grade I - full view of cords      Difficult Airway Encountered?: No      Complications:  None    Airway Device:  Direct and oral endotracheal tube    Airway Device Size:  7.5    Style/Cuff Inflation:  Cuffed (inflated to minimal occlusive pressure)    Tube secured:  22    Secured at:  The lips    Placement Verified By:  Auscultation and Capnometry    Complicating Factors:  None    Findings Post-Intubation:  Bilateral breath sounds, positive ETCO2 and atraumatic / condition of teeth unchanged       LDA:        Peripheral IV - Single Lumen 22 2153 18 G Left Hand (Active)   Site Assessment Clean;Dry;Intact;No redness;No swelling 22 1101   Extremity  Assessment Distal to IV No abnormal discoloration;No redness;No swelling;No warmth 11/05/22 1101   Line Status Infusing 11/05/22 1101   Dressing Status Clean;Dry;Intact 11/05/22 1101   Dressing Intervention Integrity maintained 11/05/22 1101   Dressing Change Due 11/08/22 11/05/22 1101   Site Change Due 11/08/22 11/05/22 1101   Reason Not Rotated Not due 11/05/22 1101   Number of days: 0            Peripheral IV - Single Lumen 11/05/22 18 G Anterior;Proximal;Right Forearm (Active)   Site Assessment Clean;Dry;Intact;No redness;No swelling 11/05/22 1101   Extremity Assessment Distal to IV No abnormal discoloration;No redness;No swelling;No warmth 11/05/22 1101   Line Status Blood return noted;Infusing 11/05/22 1101   Dressing Status Clean;Dry;Intact 11/05/22 1101   Dressing Intervention Integrity maintained 11/05/22 1101   Dressing Change Due 11/09/22 11/05/22 1101   Site Change Due 11/09/22 11/05/22 1101   Reason Not Rotated Not due 11/05/22 1101   Number of days: 0            Peripheral IV - Single Lumen 11/05/22 20 G Anterior;Right Upper Arm (Active)   Site Assessment Clean;Dry;Intact;No redness;No swelling 11/05/22 1101   Extremity Assessment Distal to IV No abnormal discoloration;No redness;No swelling;No warmth 11/05/22 1101   Line Status Infusing 11/05/22 1101   Dressing Status Clean;Dry;Intact 11/05/22 1101   Dressing Intervention Integrity maintained 11/05/22 1101   Dressing Change Due 11/09/22 11/05/22 1101   Site Change Due 11/09/22 11/05/22 1101   Reason Not Rotated Not due 11/05/22 1101   Number of days: 0            NG/OG Tube 11/05/22 0333 orogastric 18 Fr. Center mouth (Active)   Placement Check placement verified by x-ray 11/05/22 1101   Tolerance no signs/symptoms of discomfort 11/05/22 1101   Securement secured to commercial device 11/05/22 1101   Clamp Status/Tolerance no abdominal discomfort;no abdominal distention;no emesis;no nausea;no residual;no restlessness 11/05/22 1101   Suction  "Setting/Drainage Method suction at;low;intermittent setting 11/05/22 1101   Insertion Site Appearance no redness, warmth, tenderness, skin breakdown, drainage 11/05/22 1101   Drainage Bile 11/05/22 1101   Flush/Irrigation flushed w/;water;no resistance met 11/05/22 1101   Intake (mL) 30 mL 11/05/22 1001   Water Bolus (mL) 30 mL 11/05/22 1001   Tube Output(mL)(Include Discarded Residual) 160 mL 11/05/22 1001   Number of days: 0            Urethral Catheter 11/05/22 0630 Non-latex 16 Fr. (Active)   Site Assessment Clean;Intact 11/05/22 1101   Collection Container Urimeter 11/05/22 1101   Securement Method secured to top of thigh w/ adhesive device 11/05/22 1101   Catheter Care Performed yes 11/05/22 1101   Reason for Continuing Urinary Catheterization Critically ill in ICU and requiring hourly monitoring of intake/output 11/05/22 1101   CAUTI Prevention Bundle Securement Device in place with 1" slack;Intact seal between catheter & drainage tubing;Drainage bag/urimeter off the floor;Sheeting clip in use;No dependent loops or kinks;Drainage bag/urimeter not overfilled (<2/3 full);Drainage bag/urimeter below bladder 11/05/22 1101   Output (mL) 75 mL 11/05/22 1301   Number of days: 0       Drips:    fentanyl 50 mcg/hr (11/05/22 1201)    insulin regular 1 units/mL infusion orderable (DKA) 2 Units/hr (11/05/22 1201)    phenylephrine          Patient Active Problem List   Diagnosis    Chest pain at rest    Persistent atrial fibrillation    GERD (gastroesophageal reflux disease)    Cerebrovascular accident (CVA) due to embolism of left middle cerebral artery    Left-sided nontraumatic intracerebral hemorrhage    Diabetes mellitus    Hyperlipidemia    Groin swelling       Review of patient's allergies indicates:  No Known Allergies    Current Inpatient Medications:    atorvastatin  40 mg Per OG tube Daily    famotidine  20 mg Per OG tube BID    levetiracetam IV  500 mg Intravenous Q12H    metoprolol tartrate  50 " mg Per OG tube BID    NORepinephrine bitartrate-D5W        sodium chloride 0.9%  3 mL Intravenous Q8H       No current facility-administered medications on file prior to encounter.     Current Outpatient Medications on File Prior to Encounter   Medication Sig Dispense Refill    amoxicillin (AMOXIL) 500 MG capsule Take 500 mg by mouth 3 (three) times daily. With dental procedures      aspirin 81 MG Chew Take 1 tablet (81 mg total) by mouth once daily.  0    atorvastatin (LIPITOR) 40 MG tablet Take 40 mg by mouth once daily.      cholecalciferol, vitamin D3, (VITAMIN D3) 50 mcg (2,000 unit) Cap capsule Take 2,000 Units by mouth once daily.      clopidogreL (PLAVIX) 75 mg tablet Take 1 tablet (75 mg total) by mouth once daily. 30 tablet 11    metoprolol tartrate (LOPRESSOR) 50 MG tablet Take 25 mg by mouth 2 (two) times daily.      multivit-min/iron/folic/vit K1 (CENTRUM CHEWABLES ORAL) Take by mouth.      NON FORMULARY MEDICATION Prevagen- 1 tab daily      pantoprazole (PROTONIX) 40 MG tablet Take 1 tablet (40 mg total) by mouth once daily.      propafenone (RHTHYMOL) 150 MG Tab Take 150 mg by mouth every 8 (eight) hours.      ramipriL (ALTACE) 10 MG capsule Take 10 mg by mouth once daily.      ramipriL (ALTACE) 5 MG capsule Take 2 capsules (10 mg total) by mouth once daily. Home dose      tamsulosin (FLOMAX) 0.4 mg Cap Take 0.4 mg by mouth every evening.      travoprost, benzalkonium, (TRAVATAN) 0.004 % ophthalmic solution Place 1 drop into both eyes every evening.         Past Surgical History:   Procedure Laterality Date    CARDIOVERSION      CYSTOSCOPY      EYE SURGERY      JOINT REPLACEMENT Bilateral     TRANSESOPHAGEAL ECHOCARDIOGRAPHY N/A 11/3/2022    Procedure: ECHOCARDIOGRAM, TRANSESOPHAGEAL;  Surgeon: Ahmet Bowers MD;  Location: [facility];  Service: Cardiology;  Laterality: N/A;       Social History:  Tobacco Use: Unknown    Smoking Tobacco Use: Never    Smokeless Tobacco Use:  Unknown    Passive Exposure: Not on file       Alcohol Use: Not on file       OBJECTIVE:     Vital Signs Range:  BMI Readings from Last 1 Encounters:   11/05/22 38.09 kg/m²       Temp:  [35.7 °C (96.3 °F)-37.1 °C (98.8 °F)]   Pulse:  []   Resp:  [14-35]   BP: ()/()   SpO2:  [94 %-100 %]   Arterial Line BP: (110-167)/(44-73)        Significant Labs:        Component Value Date/Time    WBC 14.86 (H) 11/05/2022 0632    HGB 9.2 (L) 11/05/2022 0632    HCT 23 (L) 11/05/2022 0803    HCT 29.0 (L) 11/05/2022 0632     11/05/2022 0632     11/05/2022 0632    K 3.5 11/05/2022 0839     11/05/2022 0632    CO2 15 (L) 11/05/2022 0632     (H) 11/05/2022 0632    BUN 13 11/05/2022 0632    CREATININE 1.3 11/05/2022 0632    CALCIUM 8.3 (L) 11/05/2022 0632    ALBUMIN 2.7 (L) 11/05/2022 0632    PROT 5.3 (L) 11/05/2022 0632    ALKPHOS 234 (H) 11/05/2022 0632    BILITOT 2.4 (H) 11/05/2022 0632    AST 17 11/05/2022 0632    ALT 11 11/05/2022 0632    INR 1.3 (H) 11/05/2022 0632    HGBA1C 6.1 (H) 11/05/2022 0632        Please see Results Review for additional labs.     Diagnostic Studies: No relevant studies.    EKG:   Results for orders placed or performed during the hospital encounter of 11/05/22   EKG, 12 - Lead    Collection Time: 11/05/22  6:08 AM    Narrative    Test Reason :     Vent. Rate : 142 BPM     Atrial Rate : 122 BPM     P-R Int : 000 ms          QRS Dur : 080 ms      QT Int : 298 ms       P-R-T Axes : 000 068 175 degrees     QTc Int : 458 ms    Atrial fibrillation with rapid ventricular response  Cannot exclude Lateral infarct ,age undetermined  Low voltage, limb leads  Abnormal ECG  When compared with ECG of 04-NOV-2022 21:29,  Nonspecific T wave abnormality, worse in Inferior leads  Confirmed by Rogelio ADAMES, Kaleb PEREZ ([id number]) on 11/5/2022 10:20:36 AM    Referred By: JIM DEVI           Confirmed By:Kaleb Mercado MD       ECHO:  No results found for this or any previous  visit.        ASSESSMENT/PLAN:         Pre-op Assessment    I have reviewed the Patient Summary Reports.     I have reviewed the Nursing Notes. I have reviewed the NPO Status.   I have reviewed the Medications.     Review of Systems  Anesthesia Hx:  No problems with previous Anesthesia  Denies Family Hx of Anesthesia complications.   Denies Personal Hx of Anesthesia complications.   Social:  Non-Smoker, No Alcohol Use    Cardiovascular:   Hypertension CAD   ECG has been reviewed. 11/3 TYSON EF 55-65%    10/2022 EKG A-fib Carotoid Artery Disease, bilateral , Left stenosis is 1-39% , Right stenosis is  1-39% Disorder of Cardiac Rhythm, Atrial Fibrillation, Paroxysmal Atrial Fibrillation, S/P electrical cardioversion    Pulmonary:   Sleep Apnea, CPAP    Education provided regarding risk of obstructive sleep apnea     Renal/:   renal calculi    Hepatic/GI:   Denies GERD.    Neurological:   CVA (2/2022 eye stroke)    Endocrine:   Diabetes (borderline), poorly controlled  Obesity / BMI > 30      Physical Exam  General: Sedated, intubated  Femoral CVP  A-line  2 peripheral s  Airway:  Pre-Existing Airway: Oral Endotracheal tube    Dental:top      Anesthesia Plan  Type of Anesthesia, risks & benefits discussed:    Anesthesia Type: Gen ETT  Intra-op Monitoring Plan: Standard ASA Monitors, Art Line and Central Line  Post Op Pain Control Plan: multimodal analgesia and IV/PO Opioids PRN  Induction:  IV  Informed Consent: Informed consent signed with the Patient representative and all parties understand the risks and agree with anesthesia plan.  All questions answered.   ASA Score: 4 Emergent  Day of Surgery Review of History & Physical: H&P Update referred to the surgeon/provider.    Ready For Surgery From Anesthesia Perspective.     .

## 2022-11-05 NOTE — CONSULTS
[facility]  Neurosurgery  Consult Note    Inpatient consult to Neurosurgery  Consult performed by: Roe Rubalcava MD  Consult ordered by: ELAINA Juarez        Subjective:     Chief Complaint/Reason for Admission: ICH    History of Present Illness: 76M h/o AFib on Eliquis,CAD, HTN, DM II, LAAO w/Amulet procedure on 11/04/2022, who presents as a transfer from [facility] s/p [facility] for evaluation of L MCA stroke w/ICH. Patient was LKN around 2000.  He had been discharged home s/p LAAO w/Amulet device on 11/4/2022 around 1000 am. He acutely developed confusion with difficulty walking and standing, difficulty conversing, and difficulty finding his bathroom.  He presented to [facility] ED where a CTH was obtained and was negative for acute findings.Telestroke rec chemical thrombolytic; administered as the patient had been off of Eliquis >24h. Of note, per the ED record patient was noted to have increased pain and swelling in the right groin in the area of the venous access from his Cardiology procedure and then the patient began to have bleeding and bruising in the anterior thigh on he right side. Patient became hemodynamically unstable and was started on Levophed. CTH with multifocal left ICH. CTA negative for vascular lesions. Patient was intubated at OSH. Cryo was ordered at the outside hospital but not given. [place] [facility] repeat with occipital ICH component decompressing into occipital horn. No HCP. Bleeds grossly stable.       Medications Prior to Admission   Medication Sig Dispense Refill Last Dose    amoxicillin (AMOXIL) 500 MG capsule Take 500 mg by mouth 3 (three) times daily. With dental procedures       aspirin 81 MG Chew Take 1 tablet (81 mg total) by mouth once daily.  0     atorvastatin (LIPITOR) 40 MG tablet Take 40 mg by mouth once daily.       cholecalciferol, vitamin D3, (VITAMIN D3) 50 mcg (2,000 unit) Cap capsule Take 2,000 Units  by mouth once daily.       clopidogreL (PLAVIX) 75 mg tablet Take 1 tablet (75 mg total) by mouth once daily. 30 tablet 11     metoprolol tartrate (LOPRESSOR) 50 MG tablet Take 25 mg by mouth 2 (two) times daily.       multivit-min/iron/folic/vit K1 (CENTRUM CHEWABLES ORAL) Take by mouth.       NON FORMULARY MEDICATION Prevagen- 1 tab daily       pantoprazole (PROTONIX) 40 MG tablet Take 1 tablet (40 mg total) by mouth once daily.       propafenone (RHTHYMOL) 150 MG Tab Take 150 mg by mouth every 8 (eight) hours.       ramipriL (ALTACE) 10 MG capsule Take 10 mg by mouth once daily.       ramipriL (ALTACE) 5 MG capsule Take 2 capsules (10 mg total) by mouth once daily. Home dose       tamsulosin (FLOMAX) 0.4 mg Cap Take 0.4 mg by mouth every evening.       travoprost, benzalkonium, (TRAVATAN) 0.004 % ophthalmic solution Place 1 drop into both eyes every evening.          Review of patient's allergies indicates:  No Known Allergies    Past Medical History:   Diagnosis Date    A-fib     Anticoagulant long-term use     Coronary artery disease     Diabetes mellitus     no meds    Digestive disorder     Hypertension     Paroxysmal atrial fibrillation     Renal disorder     kidney stone    Sleep apnea     c pap machine used    Stroke     tia    Unspecified glaucoma      Past Surgical History:   Procedure Laterality Date    CARDIOVERSION      CYSTOSCOPY      EYE SURGERY      JOINT REPLACEMENT Bilateral     TRANSESOPHAGEAL ECHOCARDIOGRAPHY N/A 11/3/2022    Procedure: ECHOCARDIOGRAM, TRANSESOPHAGEAL;  Surgeon: Ahmet Bowers MD;  Location: [facility];  Service: Cardiology;  Laterality: N/A;     Family History       Problem Relation (Age of Onset)    Arthritis Mother    Diabetes Mother    Heart disease Mother          Tobacco Use    Smoking status: Never    Smokeless tobacco: Not on file   Substance and Sexual Activity    Alcohol use: No    Drug use: No    Sexual activity: Never     Review of  Systems   Unable to perform ROS: Intubated   Objective:     Weight: 117 kg (257 lb 15 oz)  Body mass index is 38.09 kg/m².  Vital Signs (Most Recent):  Temp: 98.4 °F (36.9 °C) (11/05/22 1140)  Pulse: 98 (11/05/22 1140)  Resp: 15 (11/05/22 1140)  BP: (!) 123/47 (11/05/22 1139)  SpO2: (!) 94 % (11/05/22 1140)   Vital Signs (24h Range):  Temp:  [96.3 °F (35.7 °C)-98.8 °F (37.1 °C)] 98.4 °F (36.9 °C)  Pulse:  [] 98  Resp:  [15-35] 15  SpO2:  [93 %-100 %] 94 %  BP: ()/() 123/47  Arterial Line BP: (110-167)/(44-73) 113/56     Date 11/05/22 0700 - 11/06/22 0659   Shift 2970-6142 8693-6490 7556-7940 24 Hour Total   INTAKE   I.V.(mL/kg) 8.3(0.1)   8.3(0.1)   Blood 333.8   333.8   NG/GT 60   60   IV Piggyback 1006   1006   Shift Total(mL/kg) 1408.1(12)   1408.1(12)   OUTPUT   Urine(mL/kg/hr) 150   150   Drains 160   160   Shift Total(mL/kg) 310(2.6)   310(2.6)   Weight (kg) 117 117 117 117              Vent Mode: A/C  Oxygen Concentration (%):  [50] 50  Resp Rate Total:  [15 br/min-25 br/min] 15 br/min  Vt Set:  [500 mL] 500 mL  PEEP/CPAP:  [5 cmH20] 5 cmH20  Mean Airway Pressure:  [8.5 cmH20-9.4 cmH20] 8.5 cmH20         NG/OG Tube 11/05/22 0333 orogastric 18 Fr. Center mouth (Active)   Intake (mL) 30 mL 11/05/22 1001   Water Bolus (mL) 30 mL 11/05/22 1001   Tube Output(mL)(Include Discarded Residual) 160 mL 11/05/22 1001            Urethral Catheter 11/05/22 0630 Non-latex 16 Fr. (Active)   Output (mL) 25 mL 11/05/22 1101       Physical Exam    Neurosurgery Physical Exam    [id number]  RP/LP 2mm and reactive  +c/c/g  L - loc  R - ext    Significant Labs:  Recent Labs   Lab 11/04/22  0918 11/04/22 2131 11/05/22  0632 11/05/22  0839   * 206* 335*  --     134* 136  --    K 3.6 4.0 3.9 3.5    105 104  --    CO2 27 23 15*  --    BUN 9 11 13  --    CREATININE 0.51* 0.66* 1.3  --    CALCIUM 7.8* 8.2* 8.3*  --      Recent Labs   Lab 11/04/22 2131 11/05/22  0128 11/05/22  0632 11/05/22  0803   WBC  9.40 22.20* 14.86*  --    HGB 11.4* 10.6* 9.2*  --    HCT 33.9* 31.5* 29.0* 23*    239 201  --      Recent Labs   Lab 11/04/22  2131 11/05/22  0632   LABPT 16.1*  --    INR 1.3* 1.3*   APTT 28.2 27.8     Microbiology Results (last 7 days)       ** No results found for the last 168 hours. **          All pertinent labs from the last 24 hours have been reviewed.    Significant Diagnostics:  I have reviewed all pertinent imaging results/findings within the past 24 hours.  CTA Head    Result Date: 11/5/2022  Extracranial and intracranial carotid vertebral arterial system is patent no large vessel occlusion or flow-limiting stenosis. A preliminary report was faxed by [facility] shortly after completion of this examination. Electronically signed by: Carson Tay MD Date:    11/05/2022 Time:    10:17    X-Ray Chest AP Single View    Result Date: 11/5/2022  As above. Electronically signed by: Turner Richardson Date:    11/05/2022 Time:    09:58    X-Ray Abdomen AP 1 View    Result Date: 11/5/2022  As above. Electronically signed by: Grisel Dasilva MD Date:    11/05/2022 Time:    08:08    CT Head Without Contrast    Result Date: 11/5/2022  Interval development of multifocal parenchymal contusions including subarachnoid hemorrhage predominantly in the left parietal temporal region including the right occipital region. A preliminary report was faxed by [facility] shortly after completion of this examination. Electronically signed by: Carson Tay MD Date:    11/05/2022 Time:    11:09    CT Head Without Contrast    Result Date: 11/5/2022  Multiple foci of intraparenchymal hemorrhage with also subarachnoid and intraventricular hemorrhage.  One of the left occipital hematomas has further decompressed into the ventricular system.  No hydrocephalus.  One  of the left temporoparietal foci of intraparenchymal hemorrhage has mildly increased in size.  No new focus of hemorrhage.  Similar mild midline shift to  the right.  The basal cisterns remain patent. No evidence of acute territorial infarct. This report was flagged in Epic as abnormal. COMMUNICATION This critical result was discovered/received at 0755. The critical information above was relayed directly by Keaton Forbes MD by telephone to Naomie Lewis NP on 11/05/2022 at 0758. Electronically signed by resident: Keaton Forbes Date:    11/05/2022 Time:    07:49 Electronically signed by: Heath Sim Date:    11/05/2022 Time:    08:22    CTA Neck    Result Date: 11/5/2022  Extracranial and intracranial carotid vertebral arterial system is patent no large vessel occlusion or flow-limiting stenosis. A preliminary report was faxed by [facility] shortly after completion of this examination. Electronically signed by: Carson Tay MD Date:    11/05/2022 Time:    10:17    X-Ray Chest AP Portable    Result Date: 11/5/2022  Interval intubation and placement of nasogastric tube. No evidence of acute disease. Electronically signed by: Carson Tay MD Date:    11/05/2022 Time:    11:12    X-Ray Chest AP Portable    Result Date: 11/5/2022  No evidence of cardiopulmonary disease. Electronically signed by: Carson Tay MD Date:    11/05/2022 Time:    10:13    CT HEAD FOR STROKE    Result Date: 11/5/2022  No acute intracranial hemorrhage or CT evidence of acute ischemia. A preliminary report was faxed by [facility] shortly after completion of this examination. Electronically signed by: Carson Tay MD Date:    11/05/2022 Time:    10:04     Assessment/Plan:     Left-sided nontraumatic intracerebral hemorrhage  76M h/o AFib on Eliquis,CAD, HTN, DM II, LAAO w/Amulet procedure on 11/04/2022, who presents as a transfer from [facility] for evaluation of L MCA stroke w/ICH (ICH Score 1). OSH CTH with multifocal left ICH. CTA negative for vascular lesions. Patient was intubated at OSH. Cryo was ordered at the outside hospital  but not given. On [place] [facility] [place], CTH repeat with occipital ICH component decompressing into occipital horn. No HCP. Bleeds grossly stable.     --Patient admitted to [facility] on telemetry      -q1h neurochecks in ICU, q2h neurochecks in stepdown, q4h neurochecks on floor  --All labs and diagnostics reviewed  --Obtain MRI Brain w/wo to evaluate for underlying lesion  --Hold anti-plt/coag medications  --SBP <140 (cardene ggt; hydralazine & labetalol PRN; transition to home meds when appropriate)  --Na >135  --Keppra 500 BID  --HOB >30  --Follow-up full pre-op labs (CBC/CMP/PT-INR/PTT/T&S)  --NPO at this time for possible operative intervention  --Continue to monitor clinically, notify NSGY immediately with any changes in neuro status    Dispo: ICU        Thank you for your consult. I will follow-up with patient. Please contact us if you have any additional questions.    Roe Rubalcava MD  Neurosurgery  [facility]   no

## 2022-11-05 NOTE — ASSESSMENT & PLAN NOTE
76M h/o AFib on Eliquis,CAD, HTN, DM II, LAAO w/Amulet procedure on 11/04/2022, who presents as a transfer from [facility] s/p [facility] for evaluation of L MCA stroke w/ICH (ICH Score 1). OSH CTH with multifocal left ICH. CTA negative for vascular lesions. Patient was intubated at OSH. Cryo was ordered at the outside hospital but not given. [place] [facility] [place] with occipital ICH component decompressing into occipital horn. No HCP. Bleeds grossly stable.     --Patient admitted to [facility] on telemetry      -q1h neurochecks in ICU, q2h neurochecks in stepdown, q4h neurochecks on floor  --All labs and diagnostics reviewed  --Obtain MRI Brain w/wo to evaluate for underlying lesion  --Hold anti-plt/coag medications  --SBP <140 (cardene ggt; hydralazine & labetalol PRN; transition to home meds when appropriate)  --Na >135  --Keppra 500 BID  --HOB >30  --Follow-up full pre-op labs (CBC/CMP/PT-INR/PTT/T&S)  --NPO at this time for possible operative intervention  --Continue to monitor clinically, notify NSGY immediately with any changes in neuro status    Dispo: ICU

## 2022-11-05 NOTE — HPI
"Luis Sorto Jr. is a 76 y.o. male with significant medical history of AFib (on Eliquis),CAD, HTN, DM II, s/p LAAO w/amulet procedure on 11/04/2022 presented to hospital as a transfer from [facility] for evaluation of L MCA stroke w/ICH.  HPI information gathered from review of the patient's medical record due to the patient being intubated, no family at the bedside.  Patient was LKN around 2000.  He had been discharged home s/p LAAO w/Amulet device on 11/4/2022 around 1000 am.  He acutely developed confusion with difficulty walking and standing, difficulty conversing, and difficulty finding his bathroom.  There were no reported preceding symptoms and nothing was reported to exacerbate or alleviate the symptoms. He presented to the [facility] ED where a CTH was obtained and was negative for acute findings.  Telestroke consult was performed by .   TNKase was administered as the patient had been off of Eliquis >24h .  Of note, per the ED record:    "On re-evaluation the patient was noted to have increased pain and swelling in the right groin in the area of the venous access from his cardiology procedure this morning.  We continue to monitor this and then the patient began to have bleeding from the access site.  There was also continued bruising and swelling in the anterior thigh on the right side.  During this time the patient was having some waxing and waning mentation but still moving all 4 extremities.  Patient then began to have a decrease in his blood pressure.  I did call and speak to Dr. Qureshi cardiology on-call he came in to help evaluate the patient.  Patient was started on Levophed was given blood transfusions as well as IV fluids and with this patient's blood pressure did improve.  Of evaluation by Cardiology he placed IV in the left groin and held pressure within the right groin and then placed a pressure dressing.  With all of these interventions over approximately a 90 minute " "time.  The patient has stabilized.  No further active bleeding within the right groin.  Patient is now on 0.2 of Levophed with stable blood pressure and vital signs although still tachycardic.  Repeat CBC unchanged  Patient's mentation has somewhat decreased he is now somewhat altered he is arousable he will mumble and answer to simple questions and was at this time that was noted that the patient is no moving his right upper and right lower extremity."    A repeat CTH was obtained and showed L ICH.  The patient was transferred to [facility] for higher level of care, neurosurgery evaluation.  On arrival to this facility the patient is intubated on fentanyl and levophed.  The fentanyl was discontinued on arrival.  He has swelling to the R groin.  He is currently unresponsive.  The patient is admitted to [facility].    "

## 2022-11-05 NOTE — SUBJECTIVE & OBJECTIVE
Medications Prior to Admission   Medication Sig Dispense Refill Last Dose    amoxicillin (AMOXIL) 500 MG capsule Take 500 mg by mouth 3 (three) times daily. With dental procedures       aspirin 81 MG Chew Take 1 tablet (81 mg total) by mouth once daily.  0     atorvastatin (LIPITOR) 40 MG tablet Take 40 mg by mouth once daily.       cholecalciferol, vitamin D3, (VITAMIN D3) 50 mcg (2,000 unit) Cap capsule Take 2,000 Units by mouth once daily.       clopidogreL (PLAVIX) 75 mg tablet Take 1 tablet (75 mg total) by mouth once daily. 30 tablet 11     metoprolol tartrate (LOPRESSOR) 50 MG tablet Take 25 mg by mouth 2 (two) times daily.       multivit-min/iron/folic/vit K1 (CENTRUM CHEWABLES ORAL) Take by mouth.       NON FORMULARY MEDICATION Prevagen- 1 tab daily       pantoprazole (PROTONIX) 40 MG tablet Take 1 tablet (40 mg total) by mouth once daily.       propafenone (RHTHYMOL) 150 MG Tab Take 150 mg by mouth every 8 (eight) hours.       ramipriL (ALTACE) 10 MG capsule Take 10 mg by mouth once daily.       ramipriL (ALTACE) 5 MG capsule Take 2 capsules (10 mg total) by mouth once daily. Home dose       tamsulosin (FLOMAX) 0.4 mg Cap Take 0.4 mg by mouth every evening.       travoprost, benzalkonium, (TRAVATAN) 0.004 % ophthalmic solution Place 1 drop into both eyes every evening.          Review of patient's allergies indicates:  No Known Allergies    Past Medical History:   Diagnosis Date    A-fib     Anticoagulant long-term use     Coronary artery disease     Diabetes mellitus     no meds    Digestive disorder     Hypertension     Paroxysmal atrial fibrillation     Renal disorder     kidney stone    Sleep apnea     c pap machine used    Stroke     tia    Unspecified glaucoma      Past Surgical History:   Procedure Laterality Date    CARDIOVERSION      CYSTOSCOPY      EYE SURGERY      JOINT REPLACEMENT Bilateral     TRANSESOPHAGEAL ECHOCARDIOGRAPHY N/A 11/3/2022    Procedure: ECHOCARDIOGRAM, TRANSESOPHAGEAL;   Surgeon: Ahmet Bowers MD;  Location: [facility];  Service: Cardiology;  Laterality: N/A;     Family History       Problem Relation (Age of Onset)    Arthritis Mother    Diabetes Mother    Heart disease Mother          Tobacco Use    Smoking status: Never    Smokeless tobacco: Not on file   Substance and Sexual Activity    Alcohol use: No    Drug use: No    Sexual activity: Never     Review of Systems   Unable to perform ROS: Intubated   Objective:     Weight: 117 kg (257 lb 15 oz)  Body mass index is 38.09 kg/m².  Vital Signs (Most Recent):  Temp: 98.4 °F (36.9 °C) (11/05/22 1140)  Pulse: 98 (11/05/22 1140)  Resp: 15 (11/05/22 1140)  BP: (!) 123/47 (11/05/22 1139)  SpO2: (!) 94 % (11/05/22 1140)   Vital Signs (24h Range):  Temp:  [96.3 °F (35.7 °C)-98.8 °F (37.1 °C)] 98.4 °F (36.9 °C)  Pulse:  [] 98  Resp:  [15-35] 15  SpO2:  [93 %-100 %] 94 %  BP: ()/() 123/47  Arterial Line BP: (110-167)/(44-73) 113/56     Date 11/05/22 0700 - 11/06/22 0659   Shift 9525-4157 6066-6267 0630-9549 24 Hour Total   INTAKE   I.V.(mL/kg) 8.3(0.1)   8.3(0.1)   Blood 333.8   333.8   NG/GT 60   60   IV Piggyback 1006   1006   Shift Total(mL/kg) 1408.1(12)   1408.1(12)   OUTPUT   Urine(mL/kg/hr) 150   150   Drains 160   160   Shift Total(mL/kg) 310(2.6)   310(2.6)   Weight (kg) 117 117 117 117              Vent Mode: A/C  Oxygen Concentration (%):  [50] 50  Resp Rate Total:  [15 br/min-25 br/min] 15 br/min  Vt Set:  [500 mL] 500 mL  PEEP/CPAP:  [5 cmH20] 5 cmH20  Mean Airway Pressure:  [8.5 cmH20-9.4 cmH20] 8.5 cmH20         NG/OG Tube 11/05/22 0333 orogastric 18 Fr. Center mouth (Active)   Intake (mL) 30 mL 11/05/22 1001   Water Bolus (mL) 30 mL 11/05/22 1001   Tube Output(mL)(Include Discarded Residual) 160 mL 11/05/22 1001            Urethral Catheter 11/05/22 0630 Non-latex 16 Fr. (Active)   Output (mL) 25 mL 11/05/22 1101       Physical Exam    Neurosurgery Physical Exam    [id number]  RP/LP 2mm and reactive  +c/c/g  L -  loc  R - ext    Significant Labs:  Recent Labs   Lab 11/04/22 0918 11/04/22 2131 11/05/22  0632 11/05/22  0839   * 206* 335*  --     134* 136  --    K 3.6 4.0 3.9 3.5    105 104  --    CO2 27 23 15*  --    BUN 9 11 13  --    CREATININE 0.51* 0.66* 1.3  --    CALCIUM 7.8* 8.2* 8.3*  --      Recent Labs   Lab 11/04/22 2131 11/05/22  0128 11/05/22  0632 11/05/22  0803   WBC 9.40 22.20* 14.86*  --    HGB 11.4* 10.6* 9.2*  --    HCT 33.9* 31.5* 29.0* 23*    239 201  --      Recent Labs   Lab 11/04/22 2131 11/05/22  0632   LABPT 16.1*  --    INR 1.3* 1.3*   APTT 28.2 27.8     Microbiology Results (last 7 days)       ** No results found for the last 168 hours. **          All pertinent labs from the last 24 hours have been reviewed.    Significant Diagnostics:  I have reviewed all pertinent imaging results/findings within the past 24 hours.  CTA Head    Result Date: 11/5/2022  Extracranial and intracranial carotid vertebral arterial system is patent no large vessel occlusion or flow-limiting stenosis. A preliminary report was faxed by [facility] shortly after completion of this examination. Electronically signed by: Carson Tay MD Date:    11/05/2022 Time:    10:17    X-Ray Chest AP Single View    Result Date: 11/5/2022  As above. Electronically signed by: Turner Richardson Date:    11/05/2022 Time:    09:58    X-Ray Abdomen AP 1 View    Result Date: 11/5/2022  As above. Electronically signed by: Grisel Dasilva MD Date:    11/05/2022 Time:    08:08    CT Head Without Contrast    Result Date: 11/5/2022  Interval development of multifocal parenchymal contusions including subarachnoid hemorrhage predominantly in the left parietal temporal region including the right occipital region. A preliminary report was faxed by [facility] shortly after completion of this examination. Electronically signed by: Carson Tay MD Date:    11/05/2022 Time:    11:09    CT Head Without  Contrast    Result Date: 11/5/2022  Multiple foci of intraparenchymal hemorrhage with also subarachnoid and intraventricular hemorrhage.  One of the left occipital hematomas has further decompressed into the ventricular system.  No hydrocephalus.  One  of the left temporoparietal foci of intraparenchymal hemorrhage has mildly increased in size.  No new focus of hemorrhage.  Similar mild midline shift to the right.  The basal cisterns remain patent. No evidence of acute territorial infarct. This report was flagged in Epic as abnormal. COMMUNICATION This critical result was discovered/received at 0755. The critical information above was relayed directly by Keaton Forbes MD by telephone to Naomie Lewis NP on 11/05/2022 at 0758. Electronically signed by resident: Keaton Forbes Date:    11/05/2022 Time:    07:49 Electronically signed by: Heath Sim Date:    11/05/2022 Time:    08:22    CTA Neck    Result Date: 11/5/2022  Extracranial and intracranial carotid vertebral arterial system is patent no large vessel occlusion or flow-limiting stenosis. A preliminary report was faxed by [facility] shortly after completion of this examination. Electronically signed by: Carson Tay MD Date:    11/05/2022 Time:    10:17    X-Ray Chest AP Portable    Result Date: 11/5/2022  Interval intubation and placement of nasogastric tube. No evidence of acute disease. Electronically signed by: Carson Tay MD Date:    11/05/2022 Time:    11:12    X-Ray Chest AP Portable    Result Date: 11/5/2022  No evidence of cardiopulmonary disease. Electronically signed by: Carson Tay MD Date:    11/05/2022 Time:    10:13    CT HEAD FOR STROKE    Result Date: 11/5/2022  No acute intracranial hemorrhage or CT evidence of acute ischemia. A preliminary report was faxed by [facility] shortly after completion of this examination. Electronically signed by: Carson Tay MD Date:    11/05/2022 Time:    10:04

## 2022-11-05 NOTE — NURSING
Patient arrived to [facility] from [facility] via [facility] by stretcher    Report received from:  RN from [facility]    Type of stroke/diagnosis: post TNK     Tenecteplase start/end at  2239    Current symptoms: unresponsive, no brainstem reflexes, no movement to any extremities     Skin assessment done: Yes  Wounds noted: blanchable redness to sacrum, R groin hematoma    *If wounds noted, was Wound Care consulted? Yes    Ross Completed? No - failed; pt intubated    Patient Belongings on Admit: blanket    NCC notified: ELAINA Alamo

## 2022-11-05 NOTE — ASSESSMENT & PLAN NOTE
-- s/p LAAO w/Amulet procedure on 11/04/2022  s/p TNKase  -- Groin swelling continuous to increase in size  -- Sand bags and davin pressure applied   -- Vascular consulted

## 2022-11-05 NOTE — ASSESSMENT & PLAN NOTE
--AFib (on Eliquis), stopped prior to procedure LAAO w/Amulet procedure on 11/04/2022   -- 5mg IV Lopressor upon admission

## 2022-11-05 NOTE — SUBJECTIVE & OBJECTIVE
Past Medical History:   Diagnosis Date    A-fib     Anticoagulant long-term use     Coronary artery disease     Diabetes mellitus     no meds    Digestive disorder     Hypertension     Paroxysmal atrial fibrillation     Renal disorder     kidney stone    Sleep apnea     c pap machine used    Stroke     tia    Unspecified glaucoma      Past Surgical History:   Procedure Laterality Date    CARDIOVERSION      CYSTOSCOPY      EYE SURGERY      JOINT REPLACEMENT Bilateral     TRANSESOPHAGEAL ECHOCARDIOGRAPHY N/A 11/3/2022    Procedure: ECHOCARDIOGRAM, TRANSESOPHAGEAL;  Surgeon: Ahmet Bowers MD;  Location: [facility];  Service: Cardiology;  Laterality: N/A;     Family History   Problem Relation Age of Onset    Arthritis Mother     Heart disease Mother     Diabetes Mother      Social History     Tobacco Use    Smoking status: Never   Substance Use Topics    Alcohol use: No    Drug use: No     Review of patient's allergies indicates:  No Known Allergies    Medications: I have reviewed the current medication administration record.    Medications Prior to Admission   Medication Sig Dispense Refill Last Dose    amoxicillin (AMOXIL) 500 MG capsule Take 500 mg by mouth 3 (three) times daily. With dental procedures       aspirin 81 MG Chew Take 1 tablet (81 mg total) by mouth once daily.  0     atorvastatin (LIPITOR) 40 MG tablet Take 40 mg by mouth once daily.       cholecalciferol, vitamin D3, (VITAMIN D3) 50 mcg (2,000 unit) Cap capsule Take 2,000 Units by mouth once daily.       clopidogreL (PLAVIX) 75 mg tablet Take 1 tablet (75 mg total) by mouth once daily. 30 tablet 11     metoprolol tartrate (LOPRESSOR) 50 MG tablet Take 25 mg by mouth 2 (two) times daily.       multivit-min/iron/folic/vit K1 (CENTRUM CHEWABLES ORAL) Take by mouth.       NON FORMULARY MEDICATION Prevagen- 1 tab daily       pantoprazole (PROTONIX) 40 MG tablet Take 1 tablet (40 mg total) by mouth once daily.       propafenone (RHTHYMOL) 150 MG Tab  Take 150 mg by mouth every 8 (eight) hours.       ramipriL (ALTACE) 10 MG capsule Take 10 mg by mouth once daily.       ramipriL (ALTACE) 5 MG capsule Take 2 capsules (10 mg total) by mouth once daily. Home dose       tamsulosin (FLOMAX) 0.4 mg Cap Take 0.4 mg by mouth every evening.       travoprost, benzalkonium, (TRAVATAN) 0.004 % ophthalmic solution Place 1 drop into both eyes every evening.          Review of Systems   Unable to perform ROS: Intubated   Constitutional:  Negative for fever.   HENT:  Negative for drooling.    Eyes:  Negative for discharge and redness.   Respiratory:  Negative for cough and shortness of breath.    Cardiovascular:  Negative for leg swelling.   Gastrointestinal:  Negative for abdominal distention, diarrhea and vomiting.   Endocrine: Negative for cold intolerance and heat intolerance.   Genitourinary:  Negative for hematuria.   Musculoskeletal:  Positive for gait problem. Negative for neck stiffness.   Skin:  Positive for wound. Negative for rash.   Allergic/Immunologic: Negative for environmental allergies and food allergies.   Neurological:  Positive for speech difficulty and weakness.   Psychiatric/Behavioral:  Positive for confusion.    Objective:     Vital Signs (Most Recent):  Temp: 98.8 °F (37.1 °C) (11/05/22 0715)  Pulse: (!) 111 (11/05/22 0715)  Resp: 18 (11/05/22 0715)  BP: (!) 163/71 (11/05/22 0645)  SpO2: 100 % (11/05/22 0715)    Vital Signs Range (Last 24H):  Temp:  [96.3 °F (35.7 °C)-99.5 °F (37.5 °C)]   Pulse:  []   Resp:  [10-35]   BP: ()/()   SpO2:  [93 %-100 %]   Arterial Line BP: (106-144)/(39-61)     Physical Exam  Vitals and nursing note reviewed.   Constitutional:       Appearance: He is obese. He is ill-appearing.      Interventions: He is intubated.   HENT:      Head: Normocephalic and atraumatic.      Right Ear: External ear normal.      Left Ear: External ear normal.      Nose: Nose normal.      Mouth/Throat:      Mouth: Mucous membranes  are moist.   Eyes:      General: No scleral icterus.        Right eye: No discharge.         Left eye: No discharge.      Conjunctiva/sclera: Conjunctivae normal.   Cardiovascular:      Rate and Rhythm: Tachycardia present. Rhythm irregular.   Pulmonary:      Effort: He is intubated.      Breath sounds: Normal breath sounds.   Abdominal:      General: There is no distension.      Palpations: Abdomen is soft.   Musculoskeletal:      Cervical back: Neck supple.      Right lower leg: No edema.      Left lower leg: No edema.   Skin:     General: Skin is warm and dry.       Neurological Exam:   LOC: comatose  Language: Intubated      Laboratory:  CMP:   Recent Labs   Lab 11/04/22  2131   CALCIUM 8.2*   ALBUMIN 3.3*   PROT 6.1*   *   K 4.0   CO2 23      BUN 11   CREATININE 0.66*   ALKPHOS 244*   ALT 22   AST 33   BILITOT 1.2     CBC:   Recent Labs   Lab 11/05/22  0128   WBC 22.20*   RBC 3.38*   HGB 10.6*   HCT 31.5*      MCV 93   MCH 31.2*   MCHC 33.5     Lipid Panel:   Recent Labs   Lab 11/05/22  0632   CHOL 70*   LDLCALC 27.6*   HDL 26*   TRIG 82     Coagulation:   Recent Labs   Lab 11/05/22  0632   INR 1.3*   APTT 27.8     Hgb A1C: No results for input(s): HGBA1C in the last 168 hours.  TSH: No results for input(s): TSH in the last 168 hours.    Diagnostic Results:      Brain imaging:    CTH 11/5/2022 obtained at OSH.  Radiology read pending.  L ICH.    CTH 11/4/2022  obtained at OSH.  Radiology read pending.  Vessel Imaging:  CTA Head 11/4/2022 obtained at OSH.  Radiology read pending.    CTA Neck 11/4/2022 obtained at OSH.  Radiology read pending.    Cardiac Evaluation:   TTE 11/4/2022   Final Impressions   1. The left ventricle is normal in size. Global left ventricular   systolic function is normal. The left ventricular ejection fraction is   65%.     2. The right ventricle is mildly enlarged.   3. The left atrium is severely enlarged.   4. Trace mitral regurgitation.   5. Mild (1+) aortic  regurgitation.   6. Mild (1+) tricuspid regurgitation.   7. The pericardium is normal in appearance.   8. trace pericardial effusion

## 2022-11-05 NOTE — ASSESSMENT & PLAN NOTE
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

## 2022-11-05 NOTE — SUBJECTIVE & OBJECTIVE
Medications Prior to Admission   Medication Sig Dispense Refill Last Dose    amoxicillin (AMOXIL) 500 MG capsule Take 500 mg by mouth 3 (three) times daily. With dental procedures       aspirin 81 MG Chew Take 1 tablet (81 mg total) by mouth once daily.  0     atorvastatin (LIPITOR) 40 MG tablet Take 40 mg by mouth once daily.       cholecalciferol, vitamin D3, (VITAMIN D3) 50 mcg (2,000 unit) Cap capsule Take 2,000 Units by mouth once daily.       clopidogreL (PLAVIX) 75 mg tablet Take 1 tablet (75 mg total) by mouth once daily. 30 tablet 11     metoprolol tartrate (LOPRESSOR) 50 MG tablet Take 25 mg by mouth 2 (two) times daily.       multivit-min/iron/folic/vit K1 (CENTRUM CHEWABLES ORAL) Take by mouth.       NON FORMULARY MEDICATION Prevagen- 1 tab daily       pantoprazole (PROTONIX) 40 MG tablet Take 1 tablet (40 mg total) by mouth once daily.       propafenone (RHTHYMOL) 150 MG Tab Take 150 mg by mouth every 8 (eight) hours.       ramipriL (ALTACE) 10 MG capsule Take 10 mg by mouth once daily.       ramipriL (ALTACE) 5 MG capsule Take 2 capsules (10 mg total) by mouth once daily. Home dose       tamsulosin (FLOMAX) 0.4 mg Cap Take 0.4 mg by mouth every evening.       travoprost, benzalkonium, (TRAVATAN) 0.004 % ophthalmic solution Place 1 drop into both eyes every evening.          Review of patient's allergies indicates:  No Known Allergies    Past Medical History:   Diagnosis Date    A-fib     Anticoagulant long-term use     Coronary artery disease     Diabetes mellitus     no meds    Digestive disorder     Hypertension     Paroxysmal atrial fibrillation     Renal disorder     kidney stone    Sleep apnea     c pap machine used    Stroke     tia    Unspecified glaucoma      Past Surgical History:   Procedure Laterality Date    CARDIOVERSION      CYSTOSCOPY      EYE SURGERY      JOINT REPLACEMENT Bilateral     TRANSESOPHAGEAL ECHOCARDIOGRAPHY N/A 11/3/2022    Procedure: ECHOCARDIOGRAM, TRANSESOPHAGEAL;   Surgeon: Ahmet Bowers MD;  Location: [facility];  Service: Cardiology;  Laterality: N/A;     Family History       Problem Relation (Age of Onset)    Arthritis Mother    Diabetes Mother    Heart disease Mother          Tobacco Use    Smoking status: Never    Smokeless tobacco: Not on file   Substance and Sexual Activity    Alcohol use: No    Drug use: No    Sexual activity: Never     Review of Systems  Intubated and sedated, unable to assess  Objective:     Vital Signs (Most Recent):  Temp: 98.4 °F (36.9 °C) (11/05/22 1140)  Pulse: 98 (11/05/22 1140)  Resp: 15 (11/05/22 1140)  BP: (!) 123/47 (11/05/22 1139)  SpO2: (!) 94 % (11/05/22 1140)   Vital Signs (24h Range):  Temp:  [96.3 °F (35.7 °C)-98.8 °F (37.1 °C)] 98.4 °F (36.9 °C)  Pulse:  [] 98  Resp:  [15-35] 15  SpO2:  [93 %-100 %] 94 %  BP: ()/() 123/47  Arterial Line BP: (110-167)/(44-73) 113/56     Weight: 117 kg (257 lb 15 oz)  Body mass index is 38.09 kg/m².    Physical Exam  Constitutional:       Comments: Intubated sedated   HENT:      Head: Normocephalic and atraumatic.   Pulmonary:      Comments: Mechanical breath sounds  Musculoskeletal:      Comments: Large hematoma involving right thigh/groin, expanding in size based on previously marked extension lines   Skin:     Capillary Refill: Capillary refill takes less than 2 seconds.       Significant Labs:  Recent Lab Results  (Last 5 results in the past 24 hours)        11/05/22  1159   11/05/22  1104   11/05/22  1009   11/05/22  0902   11/05/22  0839        [facility] Test               Site               [facility]               FiO2               Lactate, Joe         5.8  Comment: Falsely low lactic acid results can be found in samples   containing >=13.0 mg/dL total bilirubin and/or >=3.5 mg/dL   direct bilirubin.  *Critical value notification by tfb with confirmation of receipt to   thais saini [id number]         Min Vol               Mode               PEEP               PiP                POC BE               POC HCO3               POC Hematocrit               POC Ionized Calcium               POC PCO2               POC PH               POC PO2               POC Potassium               POC SATURATED O2               POC Sodium               POC TCO2               POCT Glucose 203   248   298   286         Potassium         3.5       Rate               Sample               Sp02               Vt                                      Significant Diagnostics:  CT: CT Head Without Contrast    Result Date: 11/5/2022  Interval development of multifocal parenchymal contusions including subarachnoid hemorrhage predominantly in the left parietal temporal region including the right occipital region. A preliminary report was faxed by [facility] shortly after completion of this examination. Electronically signed by: Carson Tay MD Date:    11/05/2022 Time:    11:09    CT Head Without Contrast    Result Date: 11/5/2022  Multiple foci of intraparenchymal hemorrhage with also subarachnoid and intraventricular hemorrhage.  One of the left occipital hematomas has further decompressed into the ventricular system.  No hydrocephalus.  One  of the left temporoparietal foci of intraparenchymal hemorrhage has mildly increased in size.  No new focus of hemorrhage.  Similar mild midline shift to the right.  The basal cisterns remain patent. No evidence of acute territorial infarct. This report was flagged in Epic as abnormal. COMMUNICATION This critical result was discovered/received at 0755. The critical information above was relayed directly by Keaton Forbes MD by telephone to Naomie Lewis NP on 11/05/2022 at 0758. Electronically signed by resident: Keaton Forbes Date:    11/05/2022 Time:    07:49 Electronically signed by: Heath Sim Date:    11/05/2022 Time:    08:22

## 2022-11-06 PROBLEM — I50.21 ACUTE SYSTOLIC HEART FAILURE: Status: ACTIVE | Noted: 2022-11-06

## 2022-11-06 PROBLEM — Z99.11 ON MECHANICALLY ASSISTED VENTILATION: Status: ACTIVE | Noted: 2022-11-06

## 2022-11-06 PROBLEM — R57.1 HYPOVOLEMIC SHOCK: Status: ACTIVE | Noted: 2022-11-06

## 2022-11-06 PROBLEM — I72.4 PSEUDOANEURYSM OF RIGHT FEMORAL ARTERY: Status: ACTIVE | Noted: 2022-11-06

## 2022-11-06 PROBLEM — R13.11 ORAL PHASE DYSPHAGIA: Status: ACTIVE | Noted: 2022-11-06

## 2022-11-06 PROBLEM — E87.20 LACTIC ACIDOSIS: Status: ACTIVE | Noted: 2022-11-06

## 2022-11-06 LAB
ALBUMIN SERPL BCP-MCNC: 2.3 G/DL (ref 3.5–5.2)
ALLENS TEST: ABNORMAL
ALP SERPL-CCNC: 390 U/L (ref 55–135)
ALT SERPL W/O P-5'-P-CCNC: 12 U/L (ref 10–44)
ANION GAP SERPL CALC-SCNC: 9 MMOL/L (ref 8–16)
APTT BLDCRRT: 29.9 SEC (ref 21–32)
AST SERPL-CCNC: 45 U/L (ref 10–40)
BASOPHILS # BLD AUTO: 0.02 K/UL (ref 0–0.2)
BASOPHILS # BLD AUTO: 0.04 K/UL (ref 0–0.2)
BASOPHILS NFR BLD: 0.1 % (ref 0–1.9)
BASOPHILS NFR BLD: 0.2 % (ref 0–1.9)
BILIRUB SERPL-MCNC: 1.9 MG/DL (ref 0.1–1)
BUN SERPL-MCNC: 12 MG/DL (ref 8–23)
CALCIUM SERPL-MCNC: 8.1 MG/DL (ref 8.7–10.5)
CHLORIDE SERPL-SCNC: 107 MMOL/L (ref 95–110)
CO2 SERPL-SCNC: 22 MMOL/L (ref 23–29)
CREAT SERPL-MCNC: 0.7 MG/DL (ref 0.5–1.4)
DELSYS: ABNORMAL
DIFFERENTIAL METHOD: ABNORMAL
DIFFERENTIAL METHOD: ABNORMAL
EOSINOPHIL # BLD AUTO: 0 K/UL (ref 0–0.5)
EOSINOPHIL # BLD AUTO: 0 K/UL (ref 0–0.5)
EOSINOPHIL NFR BLD: 0.1 % (ref 0–8)
EOSINOPHIL NFR BLD: 0.1 % (ref 0–8)
ERYTHROCYTE [DISTWIDTH] IN BLOOD BY AUTOMATED COUNT: 16.2 % (ref 11.5–14.5)
ERYTHROCYTE [DISTWIDTH] IN BLOOD BY AUTOMATED COUNT: 16.3 % (ref 11.5–14.5)
ERYTHROCYTE [SEDIMENTATION RATE] IN BLOOD BY WESTERGREN METHOD: 14 MM/H
EST. GFR  (NO RACE VARIABLE): >60 ML/MIN/1.73 M^2
FIO2: 50
GLUCOSE SERPL-MCNC: 177 MG/DL (ref 70–110)
HCO3 UR-SCNC: 24.5 MMOL/L (ref 24–28)
HCT VFR BLD AUTO: 28.5 % (ref 40–54)
HCT VFR BLD AUTO: 30.2 % (ref 40–54)
HGB BLD-MCNC: 10 G/DL (ref 14–18)
HGB BLD-MCNC: 9.1 G/DL (ref 14–18)
IMM GRANULOCYTES # BLD AUTO: 0.09 K/UL (ref 0–0.04)
IMM GRANULOCYTES # BLD AUTO: 0.1 K/UL (ref 0–0.04)
IMM GRANULOCYTES NFR BLD AUTO: 0.6 % (ref 0–0.5)
IMM GRANULOCYTES NFR BLD AUTO: 0.6 % (ref 0–0.5)
INR PPP: 1.2 (ref 0.8–1.2)
LACTATE SERPL-SCNC: 2.5 MMOL/L (ref 0.5–2.2)
LACTATE SERPL-SCNC: 3.4 MMOL/L (ref 0.5–2.2)
LYMPHOCYTES # BLD AUTO: 1.3 K/UL (ref 1–4.8)
LYMPHOCYTES # BLD AUTO: 1.6 K/UL (ref 1–4.8)
LYMPHOCYTES NFR BLD: 8.1 % (ref 18–48)
LYMPHOCYTES NFR BLD: 9.4 % (ref 18–48)
MAGNESIUM SERPL-MCNC: 1.8 MG/DL (ref 1.6–2.6)
MCH RBC QN AUTO: 30.5 PG (ref 27–31)
MCH RBC QN AUTO: 31.3 PG (ref 27–31)
MCHC RBC AUTO-ENTMCNC: 31.9 G/DL (ref 32–36)
MCHC RBC AUTO-ENTMCNC: 33.1 G/DL (ref 32–36)
MCV RBC AUTO: 95 FL (ref 82–98)
MCV RBC AUTO: 96 FL (ref 82–98)
MIN VOL: 7.23
MODE: ABNORMAL
MONOCYTES # BLD AUTO: 1.4 K/UL (ref 0.3–1)
MONOCYTES # BLD AUTO: 1.7 K/UL (ref 0.3–1)
MONOCYTES NFR BLD: 9 % (ref 4–15)
MONOCYTES NFR BLD: 9.5 % (ref 4–15)
NEUTROPHILS # BLD AUTO: 12.8 K/UL (ref 1.8–7.7)
NEUTROPHILS # BLD AUTO: 14 K/UL (ref 1.8–7.7)
NEUTROPHILS NFR BLD: 80.2 % (ref 38–73)
NEUTROPHILS NFR BLD: 82.1 % (ref 38–73)
NRBC BLD-RTO: 0 /100 WBC
NRBC BLD-RTO: 0 /100 WBC
PCO2 BLDA: 38.5 MMHG (ref 35–45)
PEEP: 5
PH SMN: 7.41 [PH] (ref 7.35–7.45)
PHOSPHATE SERPL-MCNC: 2.5 MG/DL (ref 2.7–4.5)
PIP: 19
PLATELET # BLD AUTO: 146 K/UL (ref 150–450)
PLATELET # BLD AUTO: 151 K/UL (ref 150–450)
PMV BLD AUTO: 9.1 FL (ref 9.2–12.9)
PMV BLD AUTO: 9.2 FL (ref 9.2–12.9)
PO2 BLDA: 199 MMHG (ref 80–100)
POC BE: 0 MMOL/L
POC SATURATED O2: 100 % (ref 95–100)
POC TCO2: 26 MMOL/L (ref 23–27)
POCT GLUCOSE: 148 MG/DL (ref 70–110)
POCT GLUCOSE: 151 MG/DL (ref 70–110)
POCT GLUCOSE: 162 MG/DL (ref 70–110)
POCT GLUCOSE: 166 MG/DL (ref 70–110)
POCT GLUCOSE: 170 MG/DL (ref 70–110)
POCT GLUCOSE: 171 MG/DL (ref 70–110)
POCT GLUCOSE: 174 MG/DL (ref 70–110)
POCT GLUCOSE: 175 MG/DL (ref 70–110)
POCT GLUCOSE: 176 MG/DL (ref 70–110)
POCT GLUCOSE: 177 MG/DL (ref 70–110)
POCT GLUCOSE: 179 MG/DL (ref 70–110)
POCT GLUCOSE: 179 MG/DL (ref 70–110)
POCT GLUCOSE: 185 MG/DL (ref 70–110)
POCT GLUCOSE: 185 MG/DL (ref 70–110)
POCT GLUCOSE: 186 MG/DL (ref 70–110)
POCT GLUCOSE: 188 MG/DL (ref 70–110)
POCT GLUCOSE: 188 MG/DL (ref 70–110)
POCT GLUCOSE: 195 MG/DL (ref 70–110)
POCT GLUCOSE: 196 MG/DL (ref 70–110)
POCT GLUCOSE: 203 MG/DL (ref 70–110)
POCT GLUCOSE: 207 MG/DL (ref 70–110)
POCT GLUCOSE: 208 MG/DL (ref 70–110)
POCT GLUCOSE: 209 MG/DL (ref 70–110)
POCT GLUCOSE: 227 MG/DL (ref 70–110)
POCT GLUCOSE: 239 MG/DL (ref 70–110)
POTASSIUM SERPL-SCNC: 4.2 MMOL/L (ref 3.5–5.1)
PROT SERPL-MCNC: 5.1 G/DL (ref 6–8.4)
PROTHROMBIN TIME: 12.4 SEC (ref 9–12.5)
RBC # BLD AUTO: 2.98 M/UL (ref 4.6–6.2)
RBC # BLD AUTO: 3.19 M/UL (ref 4.6–6.2)
SAMPLE: ABNORMAL
SITE: ABNORMAL
SODIUM SERPL-SCNC: 138 MMOL/L (ref 136–145)
SP02: 98
TROPONIN I SERPL DL<=0.01 NG/ML-MCNC: 0.05 NG/ML (ref 0–0.03)
VT: 500
WBC # BLD AUTO: 15.53 K/UL (ref 3.9–12.7)
WBC # BLD AUTO: 17.4 K/UL (ref 3.9–12.7)

## 2022-11-06 PROCEDURE — 94761 N-INVAS EAR/PLS OXIMETRY MLT: CPT

## 2022-11-06 PROCEDURE — 85610 PROTHROMBIN TIME: CPT | Performed by: PHYSICIAN ASSISTANT

## 2022-11-06 PROCEDURE — 20000000 HC ICU ROOM

## 2022-11-06 PROCEDURE — 84484 ASSAY OF TROPONIN QUANT: CPT | Performed by: NURSE PRACTITIONER

## 2022-11-06 PROCEDURE — 25000003 PHARM REV CODE 250: Performed by: PHYSICIAN ASSISTANT

## 2022-11-06 PROCEDURE — 36600 WITHDRAWAL OF ARTERIAL BLOOD: CPT

## 2022-11-06 PROCEDURE — 99291 CRITICAL CARE FIRST HOUR: CPT | Mod: ,,, | Performed by: PSYCHIATRY & NEUROLOGY

## 2022-11-06 PROCEDURE — 83605 ASSAY OF LACTIC ACID: CPT | Mod: 91 | Performed by: NURSE PRACTITIONER

## 2022-11-06 PROCEDURE — C9248 INJ, CLEVIDIPINE BUTYRATE: HCPCS | Mod: JG | Performed by: PHYSICIAN ASSISTANT

## 2022-11-06 PROCEDURE — 99900035 HC TECH TIME PER 15 MIN (STAT)

## 2022-11-06 PROCEDURE — 84100 ASSAY OF PHOSPHORUS: CPT | Performed by: PHYSICIAN ASSISTANT

## 2022-11-06 PROCEDURE — 82803 BLOOD GASES ANY COMBINATION: CPT

## 2022-11-06 PROCEDURE — 83735 ASSAY OF MAGNESIUM: CPT | Performed by: PHYSICIAN ASSISTANT

## 2022-11-06 PROCEDURE — 99291 PR CRITICAL CARE, E/M 30-74 MINUTES: ICD-10-PCS | Mod: ,,, | Performed by: PSYCHIATRY & NEUROLOGY

## 2022-11-06 PROCEDURE — 85730 THROMBOPLASTIN TIME PARTIAL: CPT | Performed by: PHYSICIAN ASSISTANT

## 2022-11-06 PROCEDURE — 94667 MNPJ CHEST WALL 1ST: CPT

## 2022-11-06 PROCEDURE — 83605 ASSAY OF LACTIC ACID: CPT | Performed by: NURSE PRACTITIONER

## 2022-11-06 PROCEDURE — 63600175 PHARM REV CODE 636 W HCPCS: Performed by: NURSE PRACTITIONER

## 2022-11-06 PROCEDURE — 85025 COMPLETE CBC W/AUTO DIFF WBC: CPT | Mod: 91 | Performed by: NURSE PRACTITIONER

## 2022-11-06 PROCEDURE — 27000221 HC OXYGEN, UP TO 24 HOURS

## 2022-11-06 PROCEDURE — A4216 STERILE WATER/SALINE, 10 ML: HCPCS | Performed by: PHYSICIAN ASSISTANT

## 2022-11-06 PROCEDURE — 85025 COMPLETE CBC W/AUTO DIFF WBC: CPT | Performed by: PHYSICIAN ASSISTANT

## 2022-11-06 PROCEDURE — 94150 VITAL CAPACITY TEST: CPT

## 2022-11-06 PROCEDURE — 63600175 PHARM REV CODE 636 W HCPCS: Mod: JG | Performed by: PHYSICIAN ASSISTANT

## 2022-11-06 PROCEDURE — 99900026 HC AIRWAY MAINTENANCE (STAT)

## 2022-11-06 PROCEDURE — 25000003 PHARM REV CODE 250: Performed by: NURSE PRACTITIONER

## 2022-11-06 PROCEDURE — 63600175 PHARM REV CODE 636 W HCPCS: Performed by: PHYSICIAN ASSISTANT

## 2022-11-06 PROCEDURE — 94003 VENT MGMT INPAT SUBQ DAY: CPT

## 2022-11-06 PROCEDURE — 80053 COMPREHEN METABOLIC PANEL: CPT | Performed by: PHYSICIAN ASSISTANT

## 2022-11-06 RX ORDER — CEFAZOLIN SODIUM 1 G/3ML
INJECTION, POWDER, FOR SOLUTION INTRAMUSCULAR; INTRAVENOUS
Status: DISCONTINUED | OUTPATIENT
Start: 2022-11-05 | End: 2022-11-06

## 2022-11-06 RX ORDER — SODIUM CHLORIDE 9 MG/ML
INJECTION, SOLUTION INTRAVENOUS CONTINUOUS
Status: DISCONTINUED | OUTPATIENT
Start: 2022-11-06 | End: 2022-11-08

## 2022-11-06 RX ADMIN — SODIUM CHLORIDE: 0.9 INJECTION, SOLUTION INTRAVENOUS at 01:11

## 2022-11-06 RX ADMIN — METOPROLOL TARTRATE 50 MG: 50 TABLET, FILM COATED ORAL at 08:11

## 2022-11-06 RX ADMIN — LEVETIRACETAM 500 MG: 100 INJECTION, SOLUTION INTRAVENOUS at 08:11

## 2022-11-06 RX ADMIN — CLEVIPIDINE 1 MG/HR: 0.5 EMULSION INTRAVENOUS at 07:11

## 2022-11-06 RX ADMIN — Medication 3 ML: at 02:11

## 2022-11-06 RX ADMIN — LABETALOL HYDROCHLORIDE 10 MG: 5 INJECTION, SOLUTION INTRAVENOUS at 08:11

## 2022-11-06 RX ADMIN — ATORVASTATIN CALCIUM 40 MG: 40 TABLET, FILM COATED ORAL at 08:11

## 2022-11-06 RX ADMIN — LABETALOL HYDROCHLORIDE 10 MG: 5 INJECTION, SOLUTION INTRAVENOUS at 04:11

## 2022-11-06 RX ADMIN — FAMOTIDINE 20 MG: 20 TABLET ORAL at 08:11

## 2022-11-06 RX ADMIN — LABETALOL HYDROCHLORIDE 10 MG: 5 INJECTION, SOLUTION INTRAVENOUS at 05:11

## 2022-11-06 RX ADMIN — INSULIN HUMAN 2.9 UNITS/HR: 1 INJECTION, SOLUTION INTRAVENOUS at 08:11

## 2022-11-06 RX ADMIN — Medication 75 MCG/HR: at 09:11

## 2022-11-06 RX ADMIN — SODIUM CHLORIDE: 0.9 INJECTION, SOLUTION INTRAVENOUS at 12:11

## 2022-11-06 RX ADMIN — NICARDIPINE HYDROCHLORIDE 2.5 MG/HR: 0.2 INJECTION, SOLUTION INTRAVENOUS at 06:11

## 2022-11-06 RX ADMIN — Medication 3 ML: at 06:11

## 2022-11-06 RX ADMIN — Medication 3 ML: at 10:11

## 2022-11-06 RX ADMIN — Medication 0.75 MCG/KG/MIN: at 01:11

## 2022-11-06 RX ADMIN — SODIUM CHLORIDE: 0.9 INJECTION, SOLUTION INTRAVENOUS at 10:11

## 2022-11-06 NOTE — ADDENDUM NOTE
Addendum  created 11/06/22 0953 by Joao Crespo CRNA    Intraprocedure Meds edited, Orders acknowledged in Narrator

## 2022-11-06 NOTE — ASSESSMENT & PLAN NOTE
Diabetes Mellitus Type II  -- Hyperglycemic in the 300s on admit  -- Continue insulin gtt  -- HgbA1c 6.1

## 2022-11-06 NOTE — ASSESSMENT & PLAN NOTE
Possible stroke s/p Tenecteplase with new Left ICH   -- AFib (on Eliquis)  -- s/p LAAO w/Amulet procedure on 11/04/2022  s/p TNKase  -- Continue Neuro checks q 1hr  -- Vascular Neurology consulted  -- Neurosurgery consulted  -- SBP goal <140 (Now with new Left ICH)  -- PT/OT/Speech  -- Atorvastatin 40 mg  -- Slight increase in size of bleed overnight, no change in exam  -- No surgical intervention at this time per NSGY team

## 2022-11-06 NOTE — ASSESSMENT & PLAN NOTE
Patient active bleeding on admit requiring vasopressors, fluid resuscitation, received 1 unit cryo and 1 unit PRBC    -Currently off pressors  -CBC and coags stable  -Will monitor for further signs of bleeding

## 2022-11-06 NOTE — ANESTHESIA POSTPROCEDURE EVALUATION
Anesthesia Post Evaluation    Patient: Luis Sorto Jr.    Procedure(s) Performed: Procedure(s) (LRB):  REPAIR, PSEUDOANEURYSM, ARTERY, FEMORAL (Right)    Final Anesthesia Type: general      Patient location during evaluation: ICU  Patient participation: No - Unable to Participate, Intubation  Level of consciousness: sedated  Post-procedure vital signs: reviewed and stable  Pain management: adequate  Airway patency: patent    PONV status at discharge: No PONV  Anesthetic complications: no      Cardiovascular status: blood pressure returned to baseline  Respiratory status: ventilator  Hydration status: euvolemic  Follow-up not needed.          Vitals Value Taken Time   /60 11/05/22 1922   Temp 37.2 °C (98.96 °F) 11/05/22 1928   Pulse 111 11/05/22 1928   Resp 13 11/05/22 1928   SpO2 100 % 11/05/22 1928   Vitals shown include unvalidated device data.      No case tracking events are documented in the log.      Pain/Abelino Score: Pain Rating Prior to Med Admin: 0 (11/5/2022 11:57 AM)  Abelino Score: 10 (11/4/2022 12:00 PM)  Modified Abelino Score: 20 (11/4/2022  4:30 PM)

## 2022-11-06 NOTE — SUBJECTIVE & OBJECTIVE
Interval History:   11/7: NAEON. AFVSS. Exam stable. Na goal 145-55.     Medications:  Continuous Infusions:   sodium chloride 0.9% 100 mL/hr at 11/06/22 1019    fentanyl 75 mcg/hr (11/06/22 1019)    insulin regular 1 units/mL infusion orderable (DKA) 1.8 Units/hr (11/06/22 1019)    niCARdipine       Scheduled Meds:   atorvastatin  40 mg Per OG tube Daily    famotidine  20 mg Per OG tube BID    levetiracetam IV  500 mg Intravenous Q12H    metoprolol tartrate  50 mg Per OG tube BID    sodium chloride 0.9%  3 mL Intravenous Q8H     PRN Meds:sodium chloride, sodium chloride, sodium chloride, acetaminophen, dextrose 10%, dextrose 10%, fentaNYL, labetalol, metoprolol, ondansetron     Review of Systems  Objective:     Weight: 116.6 kg (257 lb)  Body mass index is 37.95 kg/m².  Vital Signs (Most Recent):  Temp: 99.1 °F (37.3 °C) (11/06/22 1123)  Pulse: 107 (11/06/22 1123)  Resp: 15 (11/06/22 1123)  BP: 128/64 (11/06/22 1123)  SpO2: 98 % (11/06/22 1123) Vital Signs (24h Range):  Temp:  [98.4 °F (36.9 °C)-99.3 °F (37.4 °C)] 99.1 °F (37.3 °C)  Pulse:  [] 107  Resp:  [11-15] 15  SpO2:  [90 %-100 %] 98 %  BP: ()/(47-84) 128/64  Arterial Line BP: ()/(49-59) 55/52     Date 11/06/22 0700 - 11/07/22 0659   Shift 2246-0472 3195-4807 5619-9470 24 Hour Total   INTAKE   I.V.(mL/kg) 340.7(2.9)   340.7(2.9)   NG/GT 50   50   Shift Total(mL/kg) 390.7(3.4)   390.7(3.4)   OUTPUT   Urine(mL/kg/hr) 50   50   Other 0   0   Shift Total(mL/kg) 50(0.4)   50(0.4)   Weight (kg) 116.6 116.6 116.6 116.6              Vent Mode: A/C  Oxygen Concentration (%):  [40-50] 40  Resp Rate Total:  [11 br/min-18 br/min] 14 br/min  Vt Set:  [500 mL] 500 mL  PEEP/CPAP:  [5 cmH20] 5 cmH20  Pressure Support:  [5 cmH20-10 cmH20] 5 cmH20  Mean Airway Pressure:  [7.5 cmH20-9.7 cmH20] 8.3 cmH20         Closed/Suction Drain 11/05/22 1741 Right;Anterior Groin Bulb 15 Fr. (Active)   Site Description Ecchymotic;Edema/swelling 11/06/22 0701   Dressing Type  Gauze 11/06/22 0701   Dressing Status Dry;Intact 11/06/22 0701   Dressing Intervention Integrity maintained 11/06/22 0701   Drainage Sanguineous 11/06/22 0701   Status To bulb suction 11/06/22 0701   Output (mL) 40 mL 11/06/22 0501            Closed/Suction Drain 11/05/22 1742 Right;Anterior Hip Bulb 15 Fr. (Active)   Site Description Ecchymotic;Edema/swelling 11/06/22 0701   Dressing Type Gauze 11/06/22 0701   Dressing Status Dry;Intact 11/06/22 0701   Dressing Intervention Integrity maintained 11/06/22 0701   Drainage Sanguineous 11/06/22 0701   Status To bulb suction 11/06/22 0701   Output (mL) 40 mL 11/06/22 0501            NG/OG Tube 11/05/22 0333 orogastric 18 Fr. Center mouth (Active)   Placement Check placement verified by distal tube length measurement 11/06/22 0701   Tolerance no signs/symptoms of discomfort 11/06/22 0701   Securement secured to commercial device 11/06/22 0701   Clamp Status/Tolerance no abdominal discomfort;no abdominal distention;no emesis;no nausea;no residual;no restlessness 11/06/22 0701   Suction Setting/Drainage Method suction at;low;intermittent setting 11/05/22 1501   Insertion Site Appearance no redness, warmth, tenderness, skin breakdown, drainage 11/06/22 0701   Drainage Bile 11/05/22 1501   Flush/Irrigation flushed w/;water;no resistance met 11/05/22 1501   Intake (mL) 50 mL 11/06/22 1001   Water Bolus (mL) 30 mL 11/05/22 1001   Tube Output(mL)(Include Discarded Residual) 160 mL 11/05/22 1001   Intake (mL) - Formula Tube Feeding 0 11/06/22 0501            Urethral Catheter 11/05/22 0630 Non-latex 16 Fr. (Active)   Site Assessment Clean;Intact 11/06/22 0701   Collection Container Urimeter 11/06/22 0701   Securement Method secured to top of thigh w/ adhesive device 11/06/22 0701   Catheter Care Performed yes 11/06/22 0701   Reason for Continuing Urinary Catheterization Critically ill in ICU and requiring hourly monitoring of intake/output 11/06/22 0701   CAUTI Prevention Bundle  "Securement Device in place with 1" slack;Intact seal between catheter & drainage tubing;Drainage bag/urimeter off the floor;Sheeting clip in use;No dependent loops or kinks;Drainage bag/urimeter not overfilled (<2/3 full);Drainage bag/urimeter below bladder 11/06/22 0701   Output (mL) 50 mL 11/06/22 0701       Physical Exam    Neurosurgery Physical Exam  [id number]  Localizes and spontaneous on the left  Extensor posture RUE  W/d minimally RLE  PERRL        Significant Labs:  Recent Labs   Lab 11/04/22 2131 11/05/22 0632 11/05/22  0839 11/06/22  0259   * 335*  --  177*   * 136  --  138   K 4.0 3.9 3.5 4.2    104  --  107   CO2 23 15*  --  22*   BUN 11 13  --  12   CREATININE 0.66* 1.3  --  0.7   CALCIUM 8.2* 8.3*  --  8.1*   MG  --   --   --  1.8     Recent Labs   Lab 11/05/22 0632 11/05/22  0803 11/06/22  0259 11/06/22  1121   WBC 14.86*  --  17.40* 15.53*   HGB 9.2*  --  10.0* 9.1*   HCT 29.0* 23* 30.2* 28.5*     --  151 146*     Recent Labs   Lab 11/04/22 2131 11/05/22 0632 11/06/22  0259   LABPT 16.1*  --   --    INR 1.3* 1.3* 1.2   APTT 28.2 27.8 29.9     Microbiology Results (last 7 days)       ** No results found for the last 168 hours. **          All pertinent labs from the last 24 hours have been reviewed.    Significant Diagnostics:  I have reviewed all pertinent imaging results/findings within the past 24 hours.  "

## 2022-11-06 NOTE — SUBJECTIVE & OBJECTIVE
Review of Systems: Unable to obtain a complete ROS due to level of consciousness.     Vitals:   Temp: 99.1 °F (37.3 °C)  Pulse: 98  Rhythm: atrial rhythm  BP: (!) 115/56  MAP (mmHg): 80  CVP (mean): -5 mmHg  Resp: 14  SpO2: 98 %  Oxygen Concentration (%): 40  O2 Device (Oxygen Therapy): ventilator  Vent Mode: SIMV  Set Rate: 14 BPM  Vt Set: 500 mL  Pressure Support: 5 cmH20  PEEP/CPAP: 5 cmH20  Peak Airway Pressure: 18 cmH2O  Mean Airway Pressure: 7.9 cmH20  Plateau Pressure: 0 cmH20    Temp  Min: 98.4 °F (36.9 °C)  Max: 99.3 °F (37.4 °C)  Pulse  Min: 73  Max: 126  BP  Min: 93/55  Max: 146/61  MAP (mmHg)  Min: 68  Max: 100  CVP (mean)  Min: -16 mmHg  Max: 35 mmHg  Resp  Min: 11  Max: 16  SpO2  Min: 90 %  Max: 100 %  Oxygen Concentration (%)  Min: 40  Max: 50    11/05 0701 - 11/06 0700  In: 3176.1 [I.V.:926.3]  Out: 1910 [Urine:1000; Drains:360]         Examination:   Constitutional: Well-nourished and -developed. No apparent distress.   Eyes: Conjunctiva clear, anicteric. Lids no lesions.  Head/Ears/Nose/Mouth/Throat/Neck: Moist mucous membranes. External ears, nose atraumatic.   Cardiovascular: Irregular rhythm. R groin hematoma with bruising throughout extending into scrotum. Wound vac in place. No leg edema.  Respiratory: Comfortable respirations. Clear to auscultation.  Gastrointestinal: Soft, nondistended, nontender. + bowel sounds.    Neurologic:  -GCS E 1 V 1t M 5  -Does not respond to voice. Grimaces to pain. Unable to follow commands.  -Cranial nerves: EOM intact, PERRL, no facial droop, + cough/gag, +corneals  -Motor: Moves L side spontaneously antigravity, R side withdraws to noxious stimuli  -Sensation: Intact to light touch  Unable to test orientation, language, memory, judgment, insight, coordination, gait due to level of consciousness.    Medications:   Continuoussodium chloride 0.9%, Last Rate: 100 mL/hr at 11/06/22 1019  fentanyl, Last Rate: 75 mcg/hr (11/06/22 1019)  insulin regular 1 units/mL  infusion orderable (DKA), Last Rate: 1.8 Units/hr (11/06/22 1019)  niCARdipine    Scheduledatorvastatin, 40 mg, Daily  famotidine, 20 mg, BID  levetiracetam IV, 500 mg, Q12H  metoprolol tartrate, 50 mg, BID  sodium chloride 0.9%, 3 mL, Q8H    PRNsodium chloride, , Q24H PRN  sodium chloride, , Q24H PRN  sodium chloride, , Q24H PRN  acetaminophen, 650 mg, Q6H PRN  dextrose 10%, 12.5 g, PRN  dextrose 10%, 25 g, PRN  fentaNYL, 50 mcg, Q1H PRN  labetalol, 10 mg, PRN  metoprolol, 5 mg, Q5 Min PRN  ondansetron, 4 mg, Q6H PRN       Today I independently reviewed pertinent medications, lines/drains/airways, imaging, cardiology results, laboratory results, microbiology results, notably:     ISTAT:   Recent Labs   Lab 11/06/22  0950   PH 7.412   PCO2 38.5   PO2 199*   POCSATURATED 100   HCO3 24.5   BE 0   POCTCO2 26   SAMPLE ARTERIAL      Chem:   Recent Labs   Lab 11/06/22  0003 11/06/22  0259   NA  --  138   K  --  4.2   CL  --  107   CO2  --  22*   GLU  --  177*   BUN  --  12   CREATININE  --  0.7   CALCIUM  --  8.1*   MG  --  1.8   PHOS  --  2.5*   ANIONGAP  --  9   PROT  --  5.1*   ALBUMIN  --  2.3*   BILITOT  --  1.9*   ALKPHOS  --  390*   AST  --  45*   ALT  --  12   TROPONINI 0.053*  --      Heme:   Recent Labs   Lab 11/05/22  1412 11/06/22  0259   WBC  --  17.40*   HGB  --  10.0*   HCT  --  30.2*   PLT  --  151   INR  --  1.2   FIBRINOGEN 369  --      Endo:   Recent Labs   Lab 11/06/22  0917 11/06/22  1005 11/06/22  1103   POCTGLUCOSE 177* 186* 188*

## 2022-11-06 NOTE — ASSESSMENT & PLAN NOTE
Intubated at the outside hospital for airway protection  Weaning settings as tolerated, currently on minimal vent settings  Daily ABG, CXR  Continue famotidine, peridex    Vent Mode: A/C  Oxygen Concentration (%):  [40-50] 40  Resp Rate Total:  [11 br/min-18 br/min] 14 br/min  Vt Set:  [500 mL] 500 mL  PEEP/CPAP:  [5 cmH20] 5 cmH20  Pressure Support:  [5 cmH20-10 cmH20] 5 cmH20  Mean Airway Pressure:  [7.5 cmH20-9.7 cmH20] 8.3 cmH20

## 2022-11-06 NOTE — HOSPITAL COURSE
11/06/2022: Patient is now s/p R pseudoaneurysm repair. Patient with slightly worsening ICH overnight, exam unchanged. Some oozing from the R groin site, however improved after holding pressure and applying a sandbag. Will consider FFP if continues to oozy from groin site. Patient off pressors, fluids increased. TYSON pending due to Echo findings and concern for possible thrombus.Tube feedings started.  11/07/2022: TYSON shows no thrombus and improved EF. Weaning sedation as tolerated. Discontinue venous femoral line. Restart tube feedings post TYSON.  11/08/2022: NAEON. Weaning sedation as tolerated. Transitioning from fentanyl gtt to precedex gtt. Oozing from cath site resolved. Plan for pressure support trial this afternoon. Transitioned from insulin gtt to subq insulin.  11/09/2022: NAEON. Tolerated SBT. Ceftriaxone started for UTI.   11/10/2022: NAEON. Amantadine started. GOC conversation had with son. Continuing SBT. Remains intermittently febrile with improving leukocytosis. Respiratory culture pending. US of the extremities pending to rule out DVT. ETT advanced.   11/11/2022: gram negative in resp cx- start Zosyn, updated patient's wife at bedside, GOC discussed today and Patient's wife will have a decision on Monday 11/12/2022: NAEON. Sputum growing GNR, abx broadened. CT abd/pelvis shows stable R groin pseudoaneurysm repair with surrounding edema and new finding of metastatic bone lesions likely secondary to prostate CA given enlarged prostate and elevated PSA. Discussed findings with wife at the bedside. Pulmonary toileting started, SBT today.  11/13/2022: Patient developed AF with RVR overnight. Lopressor given with little response. Amiodarone started. Metoprolol po restarted.  11/14/2022: NAEON. Patient's wife and son at the bedside and have decided to withdrawal care and transition to comfort care. Patient started on Morphine gtt and comfort care order set ordered.

## 2022-11-06 NOTE — ASSESSMENT & PLAN NOTE
S/p repair 11/5  Vascular surgery following, appreciate recs  Monitoring site for oozing, may need FFP if continues to bleed

## 2022-11-06 NOTE — ASSESSMENT & PLAN NOTE
-- s/p LAAO w/Amulet procedure on 11/04/2022  s/p TNKase  -- Groin swelling continuous to increase in size  -- Sand bags and manual pressure applied   -- Vascular surgery consulted

## 2022-11-06 NOTE — PROGRESS NOTES
[facility] - Neuro Critical Care  Neurocritical Care  Progress Note    Admit Date: 11/5/2022  Service Date: 11/06/2022  Length of Stay: 1    Subjective:     Chief Complaint: Cerebrovascular accident (CVA) due to embolism of left middle cerebral artery    History of Present Illness: Mr. Luis Sorto Jr. is a 76 y.o. male with PMHx of AFib (on Eliquis),CAD, HTN, DM II, s/p LAAO w/Amulet procedure on 11/04/2022 who presents as a transfer from [facility] to Neuro Critical Care s/p [facility] for evaluation of L MCA stroke w/ICH. HPI information gathered from review of the patient's medical record due to the patient being intubated, no family at the bedside.  Patient was LKN around 2000.  He had been discharged home s/p LAAO w/Amulet device on 11/4/2022 around 1000 am.  He acutely developed confusion with difficulty walking and standing, difficulty conversing, and difficulty finding his bathroom.  There were no reported preceding symptoms and nothing was reported to exacerbate or alleviate the symptoms. He presented to [facility] ED where a CTH was obtained and was negative for acute findings.  Telestroke consult was performed by . TNKase was administered as the patient had been off of Eliquis >24h .  Of note, per the ED record patient was noted to have increased pain and swelling in the right groin in the area of the venous access from his Cardiology procedure and then the patient began to have bleeding and bruising in the anterior thigh on he right side. Patient became hemodynamically unstable and was started on Levophed. Outside Hospital Cardiology deparment placed an IV in the left groin for access. Patient was then stabilized. CT Head was ordered and showed Left ICH. Patient was intubated at outside hospital. Cryo was ordered at the outside hospital but not given. Patient was then transferred to [facility] Neuro [facility] for a higher level of care. On arrival to [facility] the  patient is intubated on fentanyl and levophed, NIH 22, hemodynamically unstable, with swelling to the Right groin, unresponsive, Cryo re-ordered and CT Head STAT. Patient will be admitted to Neuro ICU for a higher level of care.       Hospital Course: 11/06/2022: Patient is now s/p R pseudoaneurysm repair. Patient with slightly worsening ICH overnight, exam unchanged. Some oozing from the R groin site, however improved after holding pressure and applying a sandbag. Will consider FFP if continues to oozy from groin site. Patient off pressors, fluids increased. TYSON pending due to Echo findings and concern for possible thrombus.Tube feedings started.      Review of Systems: Unable to obtain a complete ROS due to level of consciousness.     Vitals:   Temp: 99.1 °F (37.3 °C)  Pulse: 98  Rhythm: atrial rhythm  BP: (!) 115/56  MAP (mmHg): 80  CVP (mean): -5 mmHg  Resp: 14  SpO2: 98 %  Oxygen Concentration (%): 40  O2 Device (Oxygen Therapy): ventilator  Vent Mode: SIMV  Set Rate: 14 BPM  Vt Set: 500 mL  Pressure Support: 5 cmH20  PEEP/CPAP: 5 cmH20  Peak Airway Pressure: 18 cmH2O  Mean Airway Pressure: 7.9 cmH20  Plateau Pressure: 0 cmH20    Temp  Min: 98.4 °F (36.9 °C)  Max: 99.3 °F (37.4 °C)  Pulse  Min: 73  Max: 126  BP  Min: 93/55  Max: 146/61  MAP (mmHg)  Min: 68  Max: 100  CVP (mean)  Min: -16 mmHg  Max: 35 mmHg  Resp  Min: 11  Max: 16  SpO2  Min: 90 %  Max: 100 %  Oxygen Concentration (%)  Min: 40  Max: 50    11/05 0701 - 11/06 0700  In: 3176.1 [I.V.:926.3]  Out: 1910 [Urine:1000; Drains:360]         Examination:   Constitutional: Well-nourished and -developed. No apparent distress.   Eyes: Conjunctiva clear, anicteric. Lids no lesions.  Head/Ears/Nose/Mouth/Throat/Neck: Moist mucous membranes. External ears, nose atraumatic.   Cardiovascular: Irregular rhythm. R groin hematoma with bruising throughout extending into scrotum. Wound vac in place. No leg edema.  Respiratory: Comfortable respirations. Clear to  auscultation.  Gastrointestinal: Soft, nondistended, nontender. + bowel sounds.    Neurologic:  -GCS E 1 V 1t M 5  -Does not respond to voice. Grimaces to pain. Unable to follow commands.  -Cranial nerves: EOM intact, PERRL, no facial droop, + cough/gag, +corneals  -Motor: Moves L side spontaneously antigravity, R side withdraws to noxious stimuli  -Sensation: Intact to light touch  Unable to test orientation, language, memory, judgment, insight, coordination, gait due to level of consciousness.    Medications:   Continuoussodium chloride 0.9%, Last Rate: 100 mL/hr at 11/06/22 1019  fentanyl, Last Rate: 75 mcg/hr (11/06/22 1019)  insulin regular 1 units/mL infusion orderable (DKA), Last Rate: 1.8 Units/hr (11/06/22 1019)  niCARdipine    Scheduledatorvastatin, 40 mg, Daily  famotidine, 20 mg, BID  levetiracetam IV, 500 mg, Q12H  metoprolol tartrate, 50 mg, BID  sodium chloride 0.9%, 3 mL, Q8H    PRNsodium chloride, , Q24H PRN  sodium chloride, , Q24H PRN  sodium chloride, , Q24H PRN  acetaminophen, 650 mg, Q6H PRN  dextrose 10%, 12.5 g, PRN  dextrose 10%, 25 g, PRN  fentaNYL, 50 mcg, Q1H PRN  labetalol, 10 mg, PRN  metoprolol, 5 mg, Q5 Min PRN  ondansetron, 4 mg, Q6H PRN       Today I independently reviewed pertinent medications, lines/drains/airways, imaging, cardiology results, laboratory results, microbiology results, notably:     ISTAT:   Recent Labs   Lab 11/06/22  0950   PH 7.412   PCO2 38.5   PO2 199*   POCSATURATED 100   HCO3 24.5   BE 0   POCTCO2 26   SAMPLE ARTERIAL      Chem:   Recent Labs   Lab 11/06/22  0003 11/06/22  0259   NA  --  138   K  --  4.2   CL  --  107   CO2  --  22*   GLU  --  177*   BUN  --  12   CREATININE  --  0.7   CALCIUM  --  8.1*   MG  --  1.8   PHOS  --  2.5*   ANIONGAP  --  9   PROT  --  5.1*   ALBUMIN  --  2.3*   BILITOT  --  1.9*   ALKPHOS  --  390*   AST  --  45*   ALT  --  12   TROPONINI 0.053*  --      Heme:   Recent Labs   Lab 11/05/22  1412 11/06/22  0259   WBC  --  17.40*    HGB  --  10.0*   HCT  --  30.2*   PLT  --  151   INR  --  1.2   FIBRINOGEN 369  --      Endo:   Recent Labs   Lab 11/06/22  0917 11/06/22  1005 11/06/22  1103   POCTGLUCOSE 177* 186* 188*     Assessment/Plan:     Neuro  * Cerebrovascular accident (CVA) due to embolism of left middle cerebral artery  s/p Tenecteplase with new Left ICH   Holding ASA and subq heparin due to ICH    Left-sided nontraumatic intracerebral hemorrhage  Possible stroke s/p Tenecteplase with new Left ICH   -- AFib (on Eliquis)  -- s/p LAAO w/Amulet procedure on 11/04/2022  s/p TNKase  -- Continue Neuro checks q 1hr  -- Vascular Neurology consulted  -- Neurosurgery consulted  -- SBP goal <140 (Now with new Left ICH)  -- PT/OT/Speech  -- Atorvastatin 40 mg  -- Slight increase in size of bleed overnight, no change in exam  -- No surgical intervention at this time per NSGY team    Pulmonary  On mechanically assisted ventilation  Intubated at the outside hospital for airway protection  Weaning settings as tolerated, currently on minimal vent settings  Daily ABG, CXR  Continue famotidine, peridex    Vent Mode: A/C  Oxygen Concentration (%):  [40-50] 40  Resp Rate Total:  [11 br/min-18 br/min] 14 br/min  Vt Set:  [500 mL] 500 mL  PEEP/CPAP:  [5 cmH20] 5 cmH20  Pressure Support:  [5 cmH20-10 cmH20] 5 cmH20  Mean Airway Pressure:  [7.5 cmH20-9.7 cmH20] 8.3 cmH20    Cardiac/Vascular  Hypovolemic shock  Patient active bleeding on admit requiring vasopressors, fluid resuscitation, received 1 unit cryo and 1 unit PRBC    -Currently off pressors  -CBC and coags stable  -Will monitor for further signs of bleeding    Acute systolic heart failure  Echo shows:  The following segments are hypokinetic: mid inferoseptal, apical septal and apex. Small dyskinetic apical cap with no thrombus seen. All other segments are normal.  The estimated ejection fraction is 45%.  The left ventricle is normal in size with concentric remodeling and mildly decreased systolic  function.  Left ventricular diastolic dysfunction.  Normal right ventricular size with normal right ventricular systolic function.  Severe left atrial enlargement.  The estimated PA systolic pressure is 28 mmHg.  Normal central venous pressure (3 mmHg).    TYSON pending to rule out thrombus    Pseudoaneurysm of right femoral artery  S/p repair 11/5  Vascular surgery following, appreciate recs  Monitoring site for oozing, may need FFP if continues to bleed    Hyperlipidemia  -- Lipid panel  -- Atorvastatin 40 mg daily    Persistent atrial fibrillation  -- AFib (on Eliquis), stopped prior to procedure LAAO w/Amulet procedure on 11/04/2022   -- 5mg IV Lopressor upon admission  -- Home metoprolol started    Renal/  Lactic acidosis  Trending lactate  Continue IVF    Endocrine  Diabetes mellitus  Diabetes Mellitus Type II  -- Hyperglycemic in the 300s on admit  -- Continue insulin gtt  -- HgbA1c 6.1    GI  Oral phase dysphagia  Intubated  OGT in place for nutrition and meds  Start tube feedings    Groin swelling  -- s/p LAAO w/Amulet procedure on 11/04/2022  s/p TNKase  -- Groin swelling continuous to increase in size  -- Sand bags and manual pressure applied   -- Vascular surgery consulted    The patient is being Prophylaxed for:  Venous Thromboembolism with: Mechanical  Stress Ulcer with: H2B  Ventilator Pneumonia with: chlorhexidine oral care    Activity Orders            Turn patient starting at 11/05 1258    Elevate HOB starting at 11/05 0623    Diet NPO: NPO starting at 11/05 0623          Full Code     Critical condition in that Patient has a condition that poses threat to life and bodily function: Weaning mechanical ventilation, titrating vasopressors, hypovolemic shock, heart failure, lactic acidosis, hyperglycemia     41 minutes of Critical care time was spent personally by me on the following activities: development of treatment plan with patient or surrogate and bedside caregivers, discussions with  consultants, evaluation of patient's response to treatment, examination of patient, ordering and performing treatments and interventions, ordering and review of laboratory studies, ordering and review of radiographic studies, pulse oximetry, antibiotic titration if applicable, vasopressor titration if applicable, re-evaluation of patient's condition. This critical care time did not overlap with that of any other provider or involve time for any procedures. There is high probability for acute neurological change leading to clinical and possibly life-threatening deterioration requiring highest level of physician preparedness for urgent intervention.    Naomie Lewis NP  Neurocritical Care  [facility] - Neuro Critical Care

## 2022-11-06 NOTE — PT/OT/SLP PROGRESS
Occupational Therapy      Patient Name:  Luis Sorto Jr.   MRN:  [id number]    Patient not seen today secondary to Nurse/ JUMANA hold, Patient ill (intubated and not following commands), Other (not medically appropriate for therapy). Will follow-up once pt is appropriate for therapy.    11/6/2022

## 2022-11-06 NOTE — ASSESSMENT & PLAN NOTE
Echo shows:   The following segments are hypokinetic: mid inferoseptal, apical septal and apex. Small dyskinetic apical cap with no thrombus seen. All other segments are normal.   The estimated ejection fraction is 45%.   The left ventricle is normal in size with concentric remodeling and mildly decreased systolic function.   Left ventricular diastolic dysfunction.   Normal right ventricular size with normal right ventricular systolic function.   Severe left atrial enlargement.   The estimated PA systolic pressure is 28 mmHg.   Normal central venous pressure (3 mmHg).    TYSON pending to rule out thrombus

## 2022-11-06 NOTE — PROGRESS NOTES
Pt transported via bed to CT with RNs and RT at the bedside. No acute events during transport. Pt tolerated scan.     When arrived back on unit at approximately 0820, new drainage noted to right groin site. Area is soft and non-tender. Naomie Lewis NP with [facility] notified immediately of new blood. Pressure held to site. Pedal pulse +1 and palpable. Labetalol given for BP. Vascular team arrived shortly after consulted. Recommended sandbag and close monitoring.

## 2022-11-06 NOTE — ASSESSMENT & PLAN NOTE
-- AFib (on Eliquis), stopped prior to procedure LAAO w/Amulet procedure on 11/04/2022   -- 5mg IV Lopressor upon admission  -- Home metoprolol started

## 2022-11-06 NOTE — PLAN OF CARE
Problem: Fall Injury Risk  Goal: Absence of Fall and Fall-Related Injury  Outcome: Ongoing, Progressing     Problem: Restraint, Nonbehavioral (Nonviolent)  Goal: Absence of Harm or Injury  Outcome: Ongoing, Progressing   [facility] Care Plan    POC reviewed with Luis Sorto Jr. and family at 0300. Pt unable to verbalize understanding. Questions and concerns addressed with wife at bedside. No acute events overnight. Pt progressing toward goals. Will continue to monitor. See below and flowsheets for full assessment and VS info.   - MRI complete  - CT x2  - N S @75/hr added  - fentanyl gtt titrated as need  - krista currently off  - Insulin gtt titrated as ordered         Is this a stroke patient? yes- Stroke booklet reviewed with patient and family, risk factors identified for patient and stroke booklet remains at bedside for ongoing education.     Neuro:  Dunia Coma Scale  Best Eye Response: 2-->(E2) to pain  Best Motor Response: 4-->(M4) withdraws from pain  Best Verbal Response: 1-->(V1) none  Dunia Coma Scale Score: 7  Assessment Qualifiers: patient intubated        24hr Temp:  [96.3 °F (35.7 °C)-99.3 °F (37.4 °C)]     CV:   Rhythm: atrial rhythm  BP goals:   SBP < 140  MAP > 65    Resp:   O2 Device (Oxygen Therapy): ventilator  Vent Mode: A/C  Set Rate: 14 BPM  Oxygen Concentration (%): 50  Vt Set: 500 mL  PEEP/CPAP: 5 cmH20  Pressure Support: 10 cmH20    Plan: N/A    GI/:     Diet/Nutrition Received: NPO  Last Bowel Movement: 11/04/22  Voiding Characteristics: urethral catheter (bladder)    Intake/Output Summary (Last 24 hours) at 11/6/2022 0538  Last data filed at 11/6/2022 0501  Gross per 24 hour   Intake 2508.28 ml   Output 1965 ml   Net 543.28 ml          Labs/Accuchecks:  Recent Labs   Lab 11/06/22  0259   WBC 17.40*   RBC 3.19*   HGB 10.0*   HCT 30.2*         Recent Labs   Lab 11/06/22  0259      K 4.2   CO2 22*      BUN 12   CREATININE 0.7   ALKPHOS 390*   ALT 12   AST 45*   BILITOT  1.9*      Recent Labs   Lab 11/06/22  0259   INR 1.2   APTT 29.9      Recent Labs   Lab 11/05/22  0632 11/05/22  1201 11/06/22  0003     --   --    CPKMB 2.4  --   --    TROPONINI 0.141*   < > 0.053*   MB 2.1  --   --     < > = values in this interval not displayed.       Electrolytes: N/A - electrolytes WDL  Accuchecks: Q1H    Gtts:   sodium chloride 0.9% 75 mL/hr at 11/06/22 0131    fentanyl 50 mcg/hr (11/06/22 0104)    insulin regular 1 units/mL infusion orderable (DKA) 1.6 Units/hr (11/06/22 0104)    niCARdipine      phenylephrine 0.75 mcg/kg/min (11/06/22 0137)       LDA/Wounds:  Lines/Drains/Airways       Drain  Duration                  NG/OG Tube 11/05/22 0333 orogastric 18 Fr. Center mouth 1 day         Urethral Catheter 11/05/22 0630 Non-latex 16 Fr. 1 day         Closed/Suction Drain 11/05/22 1741 Right;Anterior Groin Bulb 15 Fr. <1 day         Closed/Suction Drain 11/05/22 1742 Right;Anterior Hip Bulb 15 Fr. <1 day              Airway  Duration                  Airway - Non-Surgical 11/05/22 0328 1 day              Peripheral Intravenous Line  Duration                  Peripheral IV - Single Lumen 11/04/22 2153 18 G Left Hand 1 day         Peripheral IV - Single Lumen 11/05/22 18 G Anterior;Proximal;Right Forearm 1 day         Peripheral IV - Single Lumen 11/05/22 20 G Anterior;Right Upper Arm 1 day                  Wounds: Yes  Wound care consulted: Yes

## 2022-11-06 NOTE — HOSPITAL COURSE
11/6: NAEON. Multiple imaging studies completed for surveillance of hemorrhage. CT blood pattern and coagulation factors stabilized.   11/7: NAEON. AFVSS. Exam stable. Na goal 145-55.   11/8: NAEON. AFVSS. Exam stable.

## 2022-11-06 NOTE — PLAN OF CARE
[facility] Care Plan    POC reviewed with Luis Sorto Jr. and family at 1400. Pt unable to verbalize understanding due to mechanical ventilation. Questions and concerns addressed. No acute events today. Pt progressing toward goals. Will continue to monitor. See below and flowsheets for full assessment and VS info.     -Impact Peptide initiated at 10 ml/hr. Goal 40 ml/hr  -Labetalol x3  -CT scan completed today  -Chest xray completed today  -Pending TYSON, hold tube feeds at midnight  -See previous note regarding groin site. Dressing clean, dry, and intact at this time  -NS continued at 100 ml/hr  -Cardene gtt initiated  -Bath completed  -Fentanyl gtt continued at 75 mcg/hr        Is this a stroke patient? yes- Stroke booklet reviewed with patient and family, risk factors identified for patient and stroke booklet remains at bedside for ongoing education.     Neuro:  Dunia Coma Scale  Best Eye Response: 2-->(E2) to pain  Best Motor Response: 5-->(M5) localizes pain  Best Verbal Response: 1-->(V1) none  Dunia Coma Scale Score: 8  Assessment Qualifiers: patient intubated, patient chemically sedated or paralyzed  Pupil PERRLA: yes     24 hr Temp:  [98.8 °F (37.1 °C)-99.3 °F (37.4 °C)]     CV:   Rhythm: atrial rhythm  BP goals:   SBP < 140  MAP > 65    Resp:   O2 Device (Oxygen Therapy): ventilator  Vent Mode: A/C  Set Rate: 14 BPM  Oxygen Concentration (%): 40  Vt Set: 500 mL  PEEP/CPAP: 5 cmH20  Pressure Support: 5 cmH20    Plan:  mechanical ventilation    GI/:     Diet/Nutrition Received: NPO  Last Bowel Movement: 11/04/22  Voiding Characteristics: urethral catheter (bladder)    Intake/Output Summary (Last 24 hours) at 11/6/2022 1720  Last data filed at 11/6/2022 1645  Gross per 24 hour   Intake 2732 ml   Output 1865 ml   Net 867 ml          Labs/Accuchecks:  Recent Labs   Lab 11/06/22  1121   WBC 15.53*   RBC 2.98*   HGB 9.1*   HCT 28.5*   *      Recent Labs   Lab 11/06/22  0259      K 4.2   CO2 22*   CL  107   BUN 12   CREATININE 0.7   ALKPHOS 390*   ALT 12   AST 45*   BILITOT 1.9*      Recent Labs   Lab 11/06/22  0259   INR 1.2   APTT 29.9      Recent Labs   Lab 11/05/22  0632 11/05/22  1201 11/06/22  0003     --   --    CPKMB 2.4  --   --    TROPONINI 0.141*   < > 0.053*   MB 2.1  --   --     < > = values in this interval not displayed.       Electrolytes: N/A - electrolytes WDL  Accuchecks: Q1H    Gtts:   sodium chloride 0.9% 100 mL/hr at 11/06/22 1601    fentanyl 75 mcg/hr (11/06/22 1601)    insulin regular 1 units/mL infusion orderable (DKA) 2 Units/hr (11/06/22 1601)    niCARdipine         LDA/Wounds:  Lines/Drains/Airways       Drain  Duration                  Closed/Suction Drain 11/05/22 1741 Right;Anterior Groin Bulb 15 Fr. 1 day         Closed/Suction Drain 11/05/22 1742 Right;Anterior Hip Bulb 15 Fr. 1 day         NG/OG Tube 11/05/22 0333 orogastric 18 Fr. Center mouth 1 day         Urethral Catheter 11/05/22 0630 Non-latex 16 Fr. 1 day              Airway  Duration                  Airway - Non-Surgical 11/05/22 0328 1 day              Peripheral Intravenous Line  Duration                  Peripheral IV - Single Lumen 11/04/22 2153 18 G Left Hand 1 day         Peripheral IV - Single Lumen 11/05/22 18 G Anterior;Proximal;Right Forearm 1 day         Peripheral IV - Single Lumen 11/05/22 20 G Anterior;Right Upper Arm 1 day                  Wounds: Yes  Wound care consulted: No

## 2022-11-06 NOTE — HPI
Luis Sorto Jr. is a 76 y.o. male with PMHx of AFib (on Eliquis),CAD, HTN, DM II, s/p LAAO w/Amulet procedure on 11/04/2022 who presents as a transfer from [facility] to Neuro Critical Care s/p [facility] for evaluation of L MCA stroke w/ICH. HPI information gathered from review of the patient's medical record due to the patient being intubated, no family at the bedside.  Patient was LKN around 2000.  He had been discharged home s/p LAAO w/Amulet device on 11/4/2022 around 1000 am.  He acutely developed confusion with difficulty walking and standing, difficulty conversing, and difficulty finding his bathroom.  There were no reported preceding symptoms and nothing was reported to exacerbate or alleviate the symptoms. He presented to [facility] ED where a CTH was obtained and was negative for acute findings.  Telestroke consult was performed by . TNKase was administered as the patient had been off of Eliquis >24h .  Of note, per the ED record patient was noted to have increased pain and swelling in the right groin in the area of the venous access from his Cardiology procedure and then the patient began to have bleeding and bruising in the anterior thigh on he right side. Patient became hemodynamically unstable and was started on Levophed. Outside Hospital Cardiology deparment placed an IV in the left groin for access. Patient was then stabilized. CT Head was ordered and showed Left ICH. Patient was intubated at outside hospital. Cryo was ordered at the outside hospital but not given. Patient was then transferred to [facility] for a higher level of care. On arrival to [facility] the patient is intubated on fentanyl and levophed, NIH 22, hemodynamically unstable, with swelling to the Right groin, unresponsive, Cryo re-ordered and CT Head STAT. Patient will be admitted to Neuro ICU for a higher level of care.    Vascular surgery team consulted for right groin  hematoma, ultrasound obtained demonstrating pseudoaneurysm with large hematoma. Patient unable to give any subjective history given intubation/sedation.

## 2022-11-06 NOTE — CONSULTS
[facility] - Neuro Critical Care  Vascular Surgery  Consult Note    Inpatient consult to Vascular Surgery  Consult performed by: Earnest Aparicio MD  Consult ordered by: Naomie Lewis NP        Subjective:     Chief Complaint/Reason for Admission: right groin hematoma    History of Present Illness: Luis Sorto Jr. is a 76 y.o. male with PMHx of AFib (on Eliquis),CAD, HTN, DM II, s/p LAAO w/Amulet procedure on 11/04/2022 who presents as a transfer from [facility] to [facility] for evaluation of L MCA stroke w/ICH. HPI information gathered from review of the patient's medical record due to the patient being intubated, no family at the bedside.  Patient was LKN around 2000.  He had been discharged home s/p LAAO w/Amulet device on 11/4/2022 around 1000 am.  He acutely developed confusion with difficulty walking and standing, difficulty conversing, and difficulty finding his bathroom.  There were no reported preceding symptoms and nothing was reported to exacerbate or alleviate the symptoms. He presented to [facility] ED where a CTH was obtained and was negative for acute findings.  Telestroke consult was performed by . TNKase was administered as the patient had been off of Eliquis >24h .  Of note, per the ED record patient was noted to have increased pain and swelling in the right groin in the area of the venous access from his Cardiology procedure and then the patient began to have bleeding and bruising in the anterior thigh on he right side. Patient became hemodynamically unstable and was started on Levophed. Outside Hospital Cardiology deparment placed an IV in the left groin for access. Patient was then stabilized. CT Head was ordered and showed Left ICH. Patient was intubated at outside hospital. Cryo was ordered at the outside hospital but not given. Patient was then transferred to [facility] Neuro [facility] for a higher level of  care. On arrival to [facility] the patient is intubated on fentanyl and levophed, NIH 22, hemodynamically unstable, with swelling to the Right groin, unresponsive, Cryo re-ordered and CT Head STAT. Patient will be admitted to Neuro ICU for a higher level of care.    Vascular surgery team consulted for right groin hematoma, ultrasound obtained demonstrating pseudoaneurysm with large hematoma. Patient unable to give any subjective history given intubation/sedation.       Medications Prior to Admission   Medication Sig Dispense Refill Last Dose    amoxicillin (AMOXIL) 500 MG capsule Take 500 mg by mouth 3 (three) times daily. With dental procedures       aspirin 81 MG Chew Take 1 tablet (81 mg total) by mouth once daily.  0     atorvastatin (LIPITOR) 40 MG tablet Take 40 mg by mouth once daily.       cholecalciferol, vitamin D3, (VITAMIN D3) 50 mcg (2,000 unit) Cap capsule Take 2,000 Units by mouth once daily.       clopidogreL (PLAVIX) 75 mg tablet Take 1 tablet (75 mg total) by mouth once daily. 30 tablet 11     metoprolol tartrate (LOPRESSOR) 50 MG tablet Take 25 mg by mouth 2 (two) times daily.       multivit-min/iron/folic/vit K1 (CENTRUM CHEWABLES ORAL) Take by mouth.       NON FORMULARY MEDICATION Prevagen- 1 tab daily       pantoprazole (PROTONIX) 40 MG tablet Take 1 tablet (40 mg total) by mouth once daily.       propafenone (RHTHYMOL) 150 MG Tab Take 150 mg by mouth every 8 (eight) hours.       ramipriL (ALTACE) 10 MG capsule Take 10 mg by mouth once daily.       ramipriL (ALTACE) 5 MG capsule Take 2 capsules (10 mg total) by mouth once daily. Home dose       tamsulosin (FLOMAX) 0.4 mg Cap Take 0.4 mg by mouth every evening.       travoprost, benzalkonium, (TRAVATAN) 0.004 % ophthalmic solution Place 1 drop into both eyes every evening.          Review of patient's allergies indicates:  No Known Allergies    Past Medical History:   Diagnosis Date    A-fib     Anticoagulant long-term use     Coronary  artery disease     Diabetes mellitus     no meds    Digestive disorder     Hypertension     Paroxysmal atrial fibrillation     Renal disorder     kidney stone    Sleep apnea     c pap machine used    Stroke     tia    Unspecified glaucoma      Past Surgical History:   Procedure Laterality Date    CARDIOVERSION      CYSTOSCOPY      EYE SURGERY      JOINT REPLACEMENT Bilateral     TRANSESOPHAGEAL ECHOCARDIOGRAPHY N/A 11/3/2022    Procedure: ECHOCARDIOGRAM, TRANSESOPHAGEAL;  Surgeon: Ahmet Bowers MD;  Location: [facility];  Service: Cardiology;  Laterality: N/A;     Family History       Problem Relation (Age of Onset)    Arthritis Mother    Diabetes Mother    Heart disease Mother          Tobacco Use    Smoking status: Never    Smokeless tobacco: Not on file   Substance and Sexual Activity    Alcohol use: No    Drug use: No    Sexual activity: Never     Review of Systems  Intubated and sedated, unable to assess  Objective:     Vital Signs (Most Recent):  Temp: 98.4 °F (36.9 °C) (11/05/22 1140)  Pulse: 98 (11/05/22 1140)  Resp: 15 (11/05/22 1140)  BP: (!) 123/47 (11/05/22 1139)  SpO2: (!) 94 % (11/05/22 1140)   Vital Signs (24h Range):  Temp:  [96.3 °F (35.7 °C)-98.8 °F (37.1 °C)] 98.4 °F (36.9 °C)  Pulse:  [] 98  Resp:  [15-35] 15  SpO2:  [93 %-100 %] 94 %  BP: ()/() 123/47  Arterial Line BP: (110-167)/(44-73) 113/56     Weight: 117 kg (257 lb 15 oz)  Body mass index is 38.09 kg/m².    Physical Exam  Constitutional:       Comments: Intubated sedated   HENT:      Head: Normocephalic and atraumatic.   Pulmonary:      Comments: Mechanical breath sounds  Musculoskeletal:      Comments: Large hematoma involving right thigh/groin, expanding in size based on previously marked extension lines   Skin:     Capillary Refill: Capillary refill takes less than 2 seconds.       Significant Labs:  Recent Lab Results  (Last 5 results in the past 24 hours)        11/05/22  1159   11/05/22  1104    11/05/22  1009   11/05/22  0902   11/05/22  0839        [facility] Test               Site               DelSys               FiO2               Lactate, Joe         5.8  Comment: Falsely low lactic acid results can be found in samples   containing >=13.0 mg/dL total bilirubin and/or >=3.5 mg/dL   direct bilirubin.  *Critical value notification by tfb with confirmation of receipt to   nurse  thais parisi [id number]         Min Vol               Mode               PEEP               PiP               POC BE               POC HCO3               POC Hematocrit               POC Ionized Calcium               POC PCO2               POC PH               POC PO2               POC Potassium               POC SATURATED O2               POC Sodium               POC TCO2               POCT Glucose 203   248   298   286         Potassium         3.5       Rate               Sample               Sp02               Vt                                      Significant Diagnostics:  CT: CT Head Without Contrast    Result Date: 11/5/2022  Interval development of multifocal parenchymal contusions including subarachnoid hemorrhage predominantly in the left parietal temporal region including the right occipital region. A preliminary report was faxed by [facility] shortly after completion of this examination. Electronically signed by: Carson Tay MD Date:    11/05/2022 Time:    11:09    CT Head Without Contrast    Result Date: 11/5/2022  Multiple foci of intraparenchymal hemorrhage with also subarachnoid and intraventricular hemorrhage.  One of the left occipital hematomas has further decompressed into the ventricular system.  No hydrocephalus.  One  of the left temporoparietal foci of intraparenchymal hemorrhage has mildly increased in size.  No new focus of hemorrhage.  Similar mild midline shift to the right.  The basal cisterns remain patent. No evidence of acute territorial infarct. This report was flagged in Epic as  abnormal. COMMUNICATION This critical result was discovered/received at 0755. The critical information above was relayed directly by Keaton Forbes MD by telephone to Naomie Lewis NP on 11/05/2022 at 0756. Electronically signed by resident: Keaton Forbes Date:    11/05/2022 Time:    07:49 Electronically signed by: Heath Sim Date:    11/05/2022 Time:    08:22     Assessment/Plan:     Groin swelling  76 year old man history of MCA stroke following LAAE found to have postop hematoma in right groin, ultrasound findings consistent with pseudoaneurysm    OR for emergent right groin exploration, psuedoaneurysm repair  NPO  Ancef  Consent obtained from patients wife    Earnest Blakely  Vascular Surgery Fellow, PGY-6  [phone]             Thank you for your consult. I will follow-up with patient. Please contact us if you have any additional questions.    EARNEST BLAKELY MD  Vascular Surgery  [facility] - Neuro Critical Care

## 2022-11-07 ENCOUNTER — ANESTHESIA EVENT (OUTPATIENT)
Dept: MEDSURG UNIT | Facility: HOSPITAL | Age: 77
DRG: 981 | End: 2022-11-07
Payer: MEDICARE

## 2022-11-07 ENCOUNTER — ANESTHESIA (OUTPATIENT)
Dept: MEDSURG UNIT | Facility: HOSPITAL | Age: 77
DRG: 981 | End: 2022-11-07
Payer: MEDICARE

## 2022-11-07 PROBLEM — R53.81 DEBILITY: Status: ACTIVE | Noted: 2022-11-07

## 2022-11-07 PROBLEM — Z95.818 PRESENCE OF AMULET LEFT ATRIAL APPENDAGE CLOSURE DEVICE: Status: ACTIVE | Noted: 2022-11-07

## 2022-11-07 PROBLEM — G93.5 BRAIN COMPRESSION: Status: ACTIVE | Noted: 2022-11-07

## 2022-11-07 LAB
ALBUMIN SERPL BCP-MCNC: 2 G/DL (ref 3.5–5.2)
ALLENS TEST: ABNORMAL
ALP SERPL-CCNC: 353 U/L (ref 55–135)
ALT SERPL W/O P-5'-P-CCNC: 10 U/L (ref 10–44)
AMPHETAMINES SERPL QL: NEGATIVE
ANION GAP SERPL CALC-SCNC: 7 MMOL/L (ref 8–16)
APTT BLDCRRT: 30 SEC (ref 21–32)
AST SERPL-CCNC: 28 U/L (ref 10–40)
BARBITURATES SERPL QL SCN: NEGATIVE
BASOPHILS # BLD AUTO: 0.01 K/UL (ref 0–0.2)
BASOPHILS NFR BLD: 0.1 % (ref 0–1.9)
BENZODIAZ SERPL QL SCN: NEGATIVE
BILIRUB SERPL-MCNC: 0.8 MG/DL (ref 0.1–1)
BSA FOR ECHO PROCEDURE: 2.38 M2
BUN SERPL-MCNC: 8 MG/DL (ref 8–23)
BZE SERPL QL: NEGATIVE
CALCIUM SERPL-MCNC: 8.1 MG/DL (ref 8.7–10.5)
CARBOXYTHC SERPL QL SCN: NEGATIVE
CHLORIDE SERPL-SCNC: 110 MMOL/L (ref 95–110)
CO2 SERPL-SCNC: 22 MMOL/L (ref 23–29)
CREAT SERPL-MCNC: 0.6 MG/DL (ref 0.5–1.4)
DIFFERENTIAL METHOD: ABNORMAL
EJECTION FRACTION: 55 %
EOSINOPHIL # BLD AUTO: 0 K/UL (ref 0–0.5)
EOSINOPHIL NFR BLD: 0.2 % (ref 0–8)
ERYTHROCYTE [DISTWIDTH] IN BLOOD BY AUTOMATED COUNT: 16.1 % (ref 11.5–14.5)
EST. GFR  (NO RACE VARIABLE): >60 ML/MIN/1.73 M^2
ETHANOL SERPL QL SCN: NEGATIVE
GLUCOSE SERPL-MCNC: 129 MG/DL (ref 70–110)
GLUCOSE SERPL-MCNC: 147 MG/DL (ref 70–110)
HCO3 UR-SCNC: 22.6 MMOL/L (ref 24–28)
HCO3 UR-SCNC: 26.8 MMOL/L (ref 24–28)
HCT VFR BLD AUTO: 22.3 % (ref 40–54)
HCT VFR BLD CALC: 22 %PCV (ref 36–54)
HGB BLD-MCNC: 7.3 G/DL (ref 14–18)
IMM GRANULOCYTES # BLD AUTO: 0.1 K/UL (ref 0–0.04)
IMM GRANULOCYTES NFR BLD AUTO: 0.7 % (ref 0–0.5)
LACTATE SERPL-SCNC: 1.9 MMOL/L (ref 0.5–2.2)
LYMPHOCYTES # BLD AUTO: 1.2 K/UL (ref 1–4.8)
LYMPHOCYTES NFR BLD: 8.5 % (ref 18–48)
MAGNESIUM SERPL-MCNC: 1.9 MG/DL (ref 1.6–2.6)
MCH RBC QN AUTO: 30.9 PG (ref 27–31)
MCHC RBC AUTO-ENTMCNC: 32.7 G/DL (ref 32–36)
MCV RBC AUTO: 95 FL (ref 82–98)
METHADONE SERPL QL SCN: NEGATIVE
MONOCYTES # BLD AUTO: 1.2 K/UL (ref 0.3–1)
MONOCYTES NFR BLD: 8.5 % (ref 4–15)
NEUTROPHILS # BLD AUTO: 11.1 K/UL (ref 1.8–7.7)
NEUTROPHILS NFR BLD: 82 % (ref 38–73)
NRBC BLD-RTO: 0 /100 WBC
OPIATES SERPL QL SCN: NEGATIVE
PCO2 BLDA: 38 MMHG (ref 35–45)
PCO2 BLDA: 40.6 MMHG (ref 35–45)
PCP SERPL QL SCN: NEGATIVE
PH SMN: 7.38 [PH] (ref 7.35–7.45)
PH SMN: 7.43 [PH] (ref 7.35–7.45)
PHOSPHATE SERPL-MCNC: 1.7 MG/DL (ref 2.7–4.5)
PLATELET # BLD AUTO: 131 K/UL (ref 150–450)
PMV BLD AUTO: 9 FL (ref 9.2–12.9)
PO2 BLDA: 120 MMHG (ref 80–100)
PO2 BLDA: 230 MMHG (ref 80–100)
POC BE: -3 MMOL/L
POC BE: 2 MMOL/L
POC IONIZED CALCIUM: 0.97 MMOL/L (ref 1.06–1.42)
POC SATURATED O2: 100 % (ref 95–100)
POC SATURATED O2: 99 % (ref 95–100)
POC TCO2: 24 MMOL/L (ref 23–27)
POC TCO2: 28 MMOL/L (ref 23–27)
POCT GLUCOSE: 123 MG/DL (ref 70–110)
POCT GLUCOSE: 125 MG/DL (ref 70–110)
POCT GLUCOSE: 128 MG/DL (ref 70–110)
POCT GLUCOSE: 129 MG/DL (ref 70–110)
POCT GLUCOSE: 132 MG/DL (ref 70–110)
POCT GLUCOSE: 136 MG/DL (ref 70–110)
POCT GLUCOSE: 137 MG/DL (ref 70–110)
POCT GLUCOSE: 138 MG/DL (ref 70–110)
POCT GLUCOSE: 141 MG/DL (ref 70–110)
POCT GLUCOSE: 145 MG/DL (ref 70–110)
POCT GLUCOSE: 146 MG/DL (ref 70–110)
POCT GLUCOSE: 147 MG/DL (ref 70–110)
POCT GLUCOSE: 151 MG/DL (ref 70–110)
POCT GLUCOSE: 153 MG/DL (ref 70–110)
POCT GLUCOSE: 153 MG/DL (ref 70–110)
POCT GLUCOSE: 155 MG/DL (ref 70–110)
POCT GLUCOSE: 156 MG/DL (ref 70–110)
POCT GLUCOSE: 157 MG/DL (ref 70–110)
POCT GLUCOSE: 158 MG/DL (ref 70–110)
POCT GLUCOSE: 163 MG/DL (ref 70–110)
POCT GLUCOSE: 164 MG/DL (ref 70–110)
POCT GLUCOSE: 165 MG/DL (ref 70–110)
POCT GLUCOSE: 174 MG/DL (ref 70–110)
POCT GLUCOSE: 188 MG/DL (ref 70–110)
POTASSIUM BLD-SCNC: 3.9 MMOL/L (ref 3.5–5.1)
POTASSIUM SERPL-SCNC: 3.6 MMOL/L (ref 3.5–5.1)
PROPOXYPH SERPL QL: NEGATIVE
PROT SERPL-MCNC: 4.9 G/DL (ref 6–8.4)
RBC # BLD AUTO: 2.36 M/UL (ref 4.6–6.2)
SAMPLE: ABNORMAL
SAMPLE: ABNORMAL
SITE: ABNORMAL
SODIUM BLD-SCNC: 144 MMOL/L (ref 136–145)
SODIUM SERPL-SCNC: 139 MMOL/L (ref 136–145)
TRIGL SERPL-MCNC: 79 MG/DL (ref 30–150)
WBC # BLD AUTO: 13.55 K/UL (ref 3.9–12.7)

## 2022-11-07 PROCEDURE — 99900035 HC TECH TIME PER 15 MIN (STAT)

## 2022-11-07 PROCEDURE — 63600175 PHARM REV CODE 636 W HCPCS

## 2022-11-07 PROCEDURE — 84478 ASSAY OF TRIGLYCERIDES: CPT | Performed by: PHYSICIAN ASSISTANT

## 2022-11-07 PROCEDURE — 99900026 HC AIRWAY MAINTENANCE (STAT)

## 2022-11-07 PROCEDURE — 25000003 PHARM REV CODE 250: Performed by: STUDENT IN AN ORGANIZED HEALTH CARE EDUCATION/TRAINING PROGRAM

## 2022-11-07 PROCEDURE — D9220A PRA ANESTHESIA: ICD-10-PCS | Mod: ANES,,, | Performed by: ANESTHESIOLOGY

## 2022-11-07 PROCEDURE — 85730 THROMBOPLASTIN TIME PARTIAL: CPT | Performed by: PSYCHIATRY & NEUROLOGY

## 2022-11-07 PROCEDURE — 37000008 HC ANESTHESIA 1ST 15 MINUTES

## 2022-11-07 PROCEDURE — 36600 WITHDRAWAL OF ARTERIAL BLOOD: CPT

## 2022-11-07 PROCEDURE — 31720 CLEARANCE OF AIRWAYS: CPT

## 2022-11-07 PROCEDURE — 63600175 PHARM REV CODE 636 W HCPCS: Performed by: PHYSICIAN ASSISTANT

## 2022-11-07 PROCEDURE — D9220A PRA ANESTHESIA: Mod: CRNA,,, | Performed by: STUDENT IN AN ORGANIZED HEALTH CARE EDUCATION/TRAINING PROGRAM

## 2022-11-07 PROCEDURE — 27000221 HC OXYGEN, UP TO 24 HOURS

## 2022-11-07 PROCEDURE — 99291 PR CRITICAL CARE, E/M 30-74 MINUTES: ICD-10-PCS | Mod: GC,,, | Performed by: PSYCHIATRY & NEUROLOGY

## 2022-11-07 PROCEDURE — 83605 ASSAY OF LACTIC ACID: CPT | Performed by: PHYSICIAN ASSISTANT

## 2022-11-07 PROCEDURE — 25000003 PHARM REV CODE 250: Performed by: NURSE PRACTITIONER

## 2022-11-07 PROCEDURE — A4216 STERILE WATER/SALINE, 10 ML: HCPCS | Performed by: PHYSICIAN ASSISTANT

## 2022-11-07 PROCEDURE — 94761 N-INVAS EAR/PLS OXIMETRY MLT: CPT

## 2022-11-07 PROCEDURE — 37000009 HC ANESTHESIA EA ADD 15 MINS

## 2022-11-07 PROCEDURE — 82803 BLOOD GASES ANY COMBINATION: CPT

## 2022-11-07 PROCEDURE — 83735 ASSAY OF MAGNESIUM: CPT | Performed by: PHYSICIAN ASSISTANT

## 2022-11-07 PROCEDURE — 25000003 PHARM REV CODE 250: Performed by: PHYSICIAN ASSISTANT

## 2022-11-07 PROCEDURE — 94003 VENT MGMT INPAT SUBQ DAY: CPT

## 2022-11-07 PROCEDURE — 20000000 HC ICU ROOM

## 2022-11-07 PROCEDURE — 63600175 PHARM REV CODE 636 W HCPCS: Mod: JG | Performed by: PHYSICIAN ASSISTANT

## 2022-11-07 PROCEDURE — 84100 ASSAY OF PHOSPHORUS: CPT | Performed by: PHYSICIAN ASSISTANT

## 2022-11-07 PROCEDURE — 99291 CRITICAL CARE FIRST HOUR: CPT | Mod: GC,,, | Performed by: PSYCHIATRY & NEUROLOGY

## 2022-11-07 PROCEDURE — 25000003 PHARM REV CODE 250: Performed by: PSYCHIATRY & NEUROLOGY

## 2022-11-07 PROCEDURE — 99232 SBSQ HOSP IP/OBS MODERATE 35: CPT | Mod: GC,,, | Performed by: NEUROLOGICAL SURGERY

## 2022-11-07 PROCEDURE — 85025 COMPLETE CBC W/AUTO DIFF WBC: CPT | Performed by: PSYCHIATRY & NEUROLOGY

## 2022-11-07 PROCEDURE — 63600175 PHARM REV CODE 636 W HCPCS: Performed by: STUDENT IN AN ORGANIZED HEALTH CARE EDUCATION/TRAINING PROGRAM

## 2022-11-07 PROCEDURE — D9220A PRA ANESTHESIA: Mod: ANES,,, | Performed by: ANESTHESIOLOGY

## 2022-11-07 PROCEDURE — D9220A PRA ANESTHESIA: ICD-10-PCS | Mod: CRNA,,, | Performed by: STUDENT IN AN ORGANIZED HEALTH CARE EDUCATION/TRAINING PROGRAM

## 2022-11-07 PROCEDURE — C9248 INJ, CLEVIDIPINE BUTYRATE: HCPCS | Mod: JG | Performed by: PHYSICIAN ASSISTANT

## 2022-11-07 PROCEDURE — 99232 PR SUBSEQUENT HOSPITAL CARE,LEVL II: ICD-10-PCS | Mod: GC,,, | Performed by: NEUROLOGICAL SURGERY

## 2022-11-07 PROCEDURE — 80053 COMPREHEN METABOLIC PANEL: CPT | Performed by: PHYSICIAN ASSISTANT

## 2022-11-07 RX ORDER — PHENYLEPHRINE HYDROCHLORIDE 10 MG/ML
INJECTION INTRAVENOUS
Status: DISCONTINUED | OUTPATIENT
Start: 2022-11-07 | End: 2022-11-07

## 2022-11-07 RX ORDER — ETOMIDATE 2 MG/ML
INJECTION INTRAVENOUS
Status: DISCONTINUED | OUTPATIENT
Start: 2022-11-07 | End: 2022-11-07

## 2022-11-07 RX ORDER — SODIUM,POTASSIUM PHOSPHATES 280-250MG
2 POWDER IN PACKET (EA) ORAL
Status: DISCONTINUED | OUTPATIENT
Start: 2022-11-07 | End: 2022-11-14

## 2022-11-07 RX ORDER — LANOLIN ALCOHOL/MO/W.PET/CERES
800 CREAM (GRAM) TOPICAL
Status: DISCONTINUED | OUTPATIENT
Start: 2022-11-07 | End: 2022-11-14

## 2022-11-07 RX ORDER — FENTANYL CITRATE 50 UG/ML
INJECTION, SOLUTION INTRAMUSCULAR; INTRAVENOUS
Status: DISCONTINUED | OUTPATIENT
Start: 2022-11-07 | End: 2022-11-07

## 2022-11-07 RX ORDER — MUPIROCIN 20 MG/G
OINTMENT TOPICAL 2 TIMES DAILY
Status: COMPLETED | OUTPATIENT
Start: 2022-11-07 | End: 2022-11-11

## 2022-11-07 RX ORDER — AMOXICILLIN 250 MG
1 CAPSULE ORAL 2 TIMES DAILY
Status: DISCONTINUED | OUTPATIENT
Start: 2022-11-07 | End: 2022-11-09

## 2022-11-07 RX ORDER — POLYETHYLENE GLYCOL 3350 17 G/17G
17 POWDER, FOR SOLUTION ORAL DAILY
Status: DISCONTINUED | OUTPATIENT
Start: 2022-11-07 | End: 2022-11-12

## 2022-11-07 RX ADMIN — FENTANYL CITRATE 50 MCG: 0.05 INJECTION, SOLUTION INTRAMUSCULAR; INTRAVENOUS at 02:11

## 2022-11-07 RX ADMIN — SODIUM CHLORIDE: 0.9 INJECTION, SOLUTION INTRAVENOUS at 01:11

## 2022-11-07 RX ADMIN — FENTANYL CITRATE 50 MCG: 0.05 INJECTION, SOLUTION INTRAMUSCULAR; INTRAVENOUS at 05:11

## 2022-11-07 RX ADMIN — Medication 3 ML: at 02:11

## 2022-11-07 RX ADMIN — FENTANYL CITRATE 50 MCG: 0.05 INJECTION, SOLUTION INTRAMUSCULAR; INTRAVENOUS at 06:11

## 2022-11-07 RX ADMIN — PHENYLEPHRINE HYDROCHLORIDE 50 MCG: 10 INJECTION INTRAVENOUS at 01:11

## 2022-11-07 RX ADMIN — ETOMIDATE 2 MG: 2 INJECTION INTRAVENOUS at 01:11

## 2022-11-07 RX ADMIN — CLEVIPIDINE 5 MG/HR: 0.5 EMULSION INTRAVENOUS at 05:11

## 2022-11-07 RX ADMIN — SENNOSIDES AND DOCUSATE SODIUM 1 TABLET: 50; 8.6 TABLET ORAL at 08:11

## 2022-11-07 RX ADMIN — Medication 3 ML: at 09:11

## 2022-11-07 RX ADMIN — LEVETIRACETAM 500 MG: 100 INJECTION, SOLUTION INTRAVENOUS at 08:11

## 2022-11-07 RX ADMIN — MUPIROCIN: 20 OINTMENT TOPICAL at 08:11

## 2022-11-07 RX ADMIN — ACETAMINOPHEN 650 MG: 325 TABLET ORAL at 10:11

## 2022-11-07 RX ADMIN — Medication 75 MCG/HR: at 08:11

## 2022-11-07 RX ADMIN — Medication 3 ML: at 05:11

## 2022-11-07 RX ADMIN — CLEVIPIDINE 11 MG/HR: 0.5 EMULSION INTRAVENOUS at 12:11

## 2022-11-07 RX ADMIN — PHENYLEPHRINE HYDROCHLORIDE 100 MCG: 10 INJECTION INTRAVENOUS at 01:11

## 2022-11-07 RX ADMIN — FAMOTIDINE 20 MG: 20 TABLET ORAL at 08:11

## 2022-11-07 RX ADMIN — ATORVASTATIN CALCIUM 40 MG: 40 TABLET, FILM COATED ORAL at 08:11

## 2022-11-07 RX ADMIN — METOPROLOL TARTRATE 50 MG: 50 TABLET, FILM COATED ORAL at 08:11

## 2022-11-07 RX ADMIN — POLYETHYLENE GLYCOL 3350 17 G: 17 POWDER, FOR SOLUTION ORAL at 08:11

## 2022-11-07 RX ADMIN — FENTANYL CITRATE 50 MCG: 0.05 INJECTION, SOLUTION INTRAMUSCULAR; INTRAVENOUS at 01:11

## 2022-11-07 RX ADMIN — ETOMIDATE 4 MG: 2 INJECTION INTRAVENOUS at 01:11

## 2022-11-07 NOTE — PROGRESS NOTES
[facility] Neuro Critical [facility]  Neurocritical Care  Progress Note    Admit Date: 11/5/2022  Service Date: 11/07/2022  Length of Stay: 2    Subjective:     Chief Complaint: Cerebrovascular accident (CVA) due to embolism of left middle cerebral artery    History of Present Illness: Mr. Luis Sorto Jr. is a 76 y.o. male with PMHx of AFib (on Eliquis),CAD, HTN, DM II, s/p LAAO w/Amulet procedure on 11/04/2022 who presents as a transfer from [facility] to Neuro Critical Care s/p [facility] for evaluation of L MCA stroke w/ICH. HPI information gathered from review of the patient's medical record due to the patient being intubated, no family at the bedside.  Patient was LKN around 2000.  He had been discharged home s/p LAAO w/Amulet device on 11/4/2022 around 1000 am.  He acutely developed confusion with difficulty walking and standing, difficulty conversing, and difficulty finding his bathroom.  There were no reported preceding symptoms and nothing was reported to exacerbate or alleviate the symptoms. He presented to [facility] ED where a CTH was obtained and was negative for acute findings.  Telestroke consult was performed by . TNKase was administered as the patient had been off of Eliquis >24h .  Of note, per the ED record patient was noted to have increased pain and swelling in the right groin in the area of the venous access from his Cardiology procedure and then the patient began to have bleeding and bruising in the anterior thigh on he right side. Patient became hemodynamically unstable and was started on Levophed. Outside Hospital Cardiology deparment placed an IV in the left groin for access. Patient was then stabilized. CT Head was ordered and showed Left ICH. Patient was intubated at outside hospital. Cryo was ordered at the outside hospital but not given. Patient was then transferred to [facility] Neuro [facility] for a higher level of care. On arrival to [facility] the  patient is intubated on fentanyl and levophed, NIH 22, hemodynamically unstable, with swelling to the Right groin, unresponsive, Cryo re-ordered and CT Head STAT. Patient will be admitted to Neuro ICU for a higher level of care.     Hospital Course: 11/06/2022: Patient is now s/p R pseudoaneurysm repair. Patient with slightly worsening ICH overnight, exam unchanged. Some oozing from the R groin site, however improved after holding pressure and applying a sandbag. Will consider FFP if continues to oozy from groin site. Patient off pressors, fluids increased. TYSON pending due to Echo findings and concern for possible thrombus.Tube feedings started.  11/07/2022: TYSON shows no thrombus and improved EF. Weaning sedation as tolerated. Discontinue venous femoral line. Restart tube feedings post TYSON.    Review of Systems: Unable to obtain a complete ROS due to level of consciousness.     Vitals:   Temp: 99.7 °F (37.6 °C)  Pulse: 100  Rhythm: atrial rhythm  BP: (S) (!) 148/66 (pt agitated fentanyl given seen mar)  MAP (mmHg): 85  CVP (mean): 100 mmHg  Resp: 16  SpO2: 98 %  Oxygen Concentration (%): 40  O2 Device (Oxygen Therapy): ventilator  Vent Mode: A/C  Set Rate: 16 BPM  Vt Set: 500 mL  Pressure Support: 5 cmH20  PEEP/CPAP: 5 cmH20  Peak Airway Pressure: 25 cmH2O  Mean Airway Pressure: 11 cmH20  Plateau Pressure: 0 cmH20    Temp  Min: 99 °F (37.2 °C)  Max: 99.7 °F (37.6 °C)  Pulse  Min: 82  Max: 106  BP  Min: 108/57  Max: 181/73  MAP (mmHg)  Min: 77  Max: 110  CVP (mean)  Min: -45 mmHg  Max: 100 mmHg  Resp  Min: 14  Max: 22  SpO2  Min: 97 %  Max: 100 %  Oxygen Concentration (%)  Min: 40  Max: 40    11/06 0701 - 11/07 0700  In: 3029.8 [I.V.:2718.4]  Out: 1240 [Urine:1100; Drains:140]         Examination:   Constitutional: Well-nourished and -developed. No apparent distress.   Eyes: Conjunctiva clear, anicteric. Lids no lesions.  Head/Ears/Nose/Mouth/Throat/Neck: Moist mucous membranes. External ears, nose atraumatic.    Cardiovascular: Irregular rhythm. R groin hematoma with bruising throughout extending into scrotum. Wound vac in place. No leg edema.  Respiratory: Comfortable respirations. Clear to auscultation.  Gastrointestinal: Soft, nondistended, nontender. + bowel sounds.    Neurologic:  -GCS E 1 V 1t M 5  -Does not respond to voice. Grimaces to pain. Unable to follow commands.  -Cranial nerves: EOM intact, PERRL, no facial droop, + cough/gag, +corneals  -Motor: Moves L side spontaneously antigravity, R side withdraws to noxious stimuli  -Sensation: Intact to light touch  Unable to test orientation, language, memory, judgment, insight, coordination, gait due to level of consciousness.    Medications:   Continuoussodium chloride 0.9%, Last Rate: 50 mL/hr at 11/07/22 1600  clevidipine, Last Rate: Stopped (11/07/22 1005)  fentanyl, Last Rate: 50 mcg/hr (11/07/22 1600)  insulin regular 1 units/mL infusion orderable (DKA), Last Rate: 0.6 Units/hr (11/07/22 1600)  Scheduledatorvastatin, 40 mg, Daily  famotidine, 20 mg, BID  levetiracetam IV, 500 mg, Q12H  metoprolol tartrate, 50 mg, BID  mupirocin, , BID  polyethylene glycol, 17 g, Daily  senna-docusate 8.6-50 mg, 1 tablet, BID  sodium chloride 0.9%, 3 mL, Q8H  PRNsodium chloride, , Q24H PRN  sodium chloride, , Q24H PRN  sodium chloride, , Q24H PRN  acetaminophen, 650 mg, Q6H PRN  dextrose 10%, 12.5 g, PRN  dextrose 10%, 25 g, PRN  fentaNYL, 50 mcg, Q1H PRN  labetalol, 10 mg, PRN  metoprolol, 5 mg, Q5 Min PRN  ondansetron, 4 mg, Q6H PRN     Today I independently reviewed pertinent medications, lines/drains/airways, imaging, cardiology results, laboratory results, microbiology results, notably:     ISTAT:   Recent Labs   Lab 11/07/22  0451   PH 7.428   PCO2 40.6   PO2 120*   POCSATURATED 99   HCO3 26.8   BE 2   POCTCO2 28*   SAMPLE ARTERIAL      Chem:   Recent Labs   Lab 11/07/22  0214      K 3.6      CO2 22*   *   BUN 8   CREATININE 0.6   CALCIUM 8.1*   MG 1.9    PHOS 1.7*   ANIONGAP 7*   PROT 4.9*   ALBUMIN 2.0*   BILITOT 0.8   ALKPHOS 353*   AST 28   ALT 10     Heme:   Recent Labs   Lab 11/07/22  0305   WBC 13.55*   HGB 7.3*   HCT 22.3*   *     Endo:   Recent Labs   Lab 11/07/22  1407 11/07/22  1447 11/07/22  1559   POCTGLUCOSE 141* 155* 147*     Assessment/Plan:     Neuro  * Cerebrovascular accident (CVA) due to embolism of left middle cerebral artery  s/p Tenecteplase with new Left ICH   Holding ASA and subq heparin due to ICH    Left-sided nontraumatic intracerebral hemorrhage  Possible stroke s/p Tenecteplase with new Left ICH   -- AFib (on Eliquis)  -- s/p LAAO w/Amulet procedure on 11/04/2022  s/p TNKase  -- Continue Neuro checks q 1hr  -- Vascular Neurology consulted  -- Neurosurgery consulted  -- SBP goal <140 (Now with new Left ICH)  -- PT/OT/Speech  -- Atorvastatin 40 mg  -- Slight increase in size of bleed overnight 11/5, no change in exam  -- No surgical intervention at this time per NSGY team    Pulmonary  On mechanically assisted ventilation  Intubated at the outside hospital for airway protection  Weaning settings as tolerated, currently on minimal vent settings  Daily ABG, CXR  Continue famotidine, peridex  SBT daily    Vent Mode: A/C  Oxygen Concentration (%):  [40] 40  Resp Rate Total:  [14 br/min-16 br/min] 16 br/min  Vt Set:  [500 mL] 500 mL  PEEP/CPAP:  [5 cmH20] 5 cmH20  Pressure Support:  [5 cmH20] 5 cmH20  Mean Airway Pressure:  [8.5 sbH58-58 cmH20] 11 cmH20    Cardiac/Vascular  Hypovolemic shock  Patient active bleeding on admit requiring vasopressors, fluid resuscitation, received 1 unit cryo and 1 unit PRBC    -Currently off pressors  -CBC and coags stable  -Will monitor for further signs of bleeding  -Resolved as of 11/7    Acute systolic heart failure  Echo shows:   The following segments are hypokinetic: mid inferoseptal, apical septal and apex. Small dyskinetic apical cap with no thrombus seen. All other segments are normal.   The  estimated ejection fraction is 45%.   The left ventricle is normal in size with concentric remodeling and mildly decreased systolic function.   Left ventricular diastolic dysfunction.   Normal right ventricular size with normal right ventricular systolic function.   Severe left atrial enlargement.   The estimated PA systolic pressure is 28 mmHg.   Normal central venous pressure (3 mmHg).    TYSON 11/7 shows no thrombus, improved EF of 55%    Pseudoaneurysm of right femoral artery  S/p repair 11/5  Vascular surgery following, appreciate recs  Monitoring site for oozing, may need FFP if continues to bleed    Hyperlipidemia  -- Lipid panel  -- Atorvastatin 40 mg daily    Persistent atrial fibrillation  -- AFib (on Eliquis), stopped prior to procedure LAAO w/Amulet procedure on 11/04/2022   -- 5mg IV Lopressor upon admission  -- Home metoprolol started    Renal/  Lactic acidosis  Improving  Continue IVF    Endocrine  Diabetes mellitus  Diabetes Mellitus Type II  -- Hyperglycemic in the 300s on admit  -- Continue insulin gtt, will transition to subq insulin once on tube feedings for 24 hrs  -- HgbA1c 6.1    GI  Oral phase dysphagia  Intubated  OGT in place for nutrition and meds  Cont tube feedings    Groin swelling  -- s/p LAAO w/Amulet procedure on 11/04/2022  s/p TNKase  -- Groin swelling continuous to increase in size  -- Sand bags and manual pressure applied   -- Vascular surgery consulted  -- See pseudoaneurysm    The patient is being Prophylaxed for:  Venous Thromboembolism with: Mechanical  Stress Ulcer with: H2B  Ventilator Pneumonia with: chlorhexidine oral care    Activity Orders          Turn patient starting at 11/05 1258    Elevate HOB starting at 11/05 0623    Diet NPO: NPO starting at 11/05 0623        Full Code    Critical condition in that Patient has a condition that poses threat to life and bodily function: weaning of mechanical ventilation, R femoral pseudoaneurysm, ICH, vasogenic edema, brain  compression, debility     35 minutes of Critical care time was spent personally by me on the following activities: development of treatment plan with patient or surrogate and bedside caregivers, discussions with consultants, evaluation of patient's response to treatment, examination of patient, ordering and performing treatments and interventions, ordering and review of laboratory studies, ordering and review of radiographic studies, pulse oximetry, antibiotic titration if applicable, vasopressor titration if applicable, re-evaluation of patient's condition. This critical care time did not overlap with that of any other provider or involve time for any procedures. There is high probability for acute neurological change leading to clinical and possibly life-threatening deterioration requiring highest level of physician preparedness for urgent intervention.    Naomie Lewis NP  Neurocritical Care  [facility] - Neuro Critical [facility]

## 2022-11-07 NOTE — PLAN OF CARE
Problem: Restraint, Nonbehavioral (Nonviolent)  Goal: Absence of Harm or Injury  Outcome: Ongoing, Progressing     Problem: Skin and Tissue Injury (Artificial Airway)  Goal: Absence of Device-Related Skin or Tissue Injury  Outcome: Ongoing, Progressing   [facility] Care Plan    POC reviewed with Luis Sorto Jr. and family at 0300. Pt unable to verbalize understanding. Questions and concerns addressed with wife at bedside. No acute events overnight. Pt progressing toward goals. Will continue to monitor. See below and flowsheets for full assessment and VS info.   - cardene d/c, cleviprex  initiated  - insulin gtt   - fentanyl @75 ml/hr        Is this a stroke patient? yes- Stroke booklet reviewed with patient and family, risk factors identified for patient and stroke booklet remains at bedside for ongoing education.     Neuro:  [place] Coma Scale  Best Eye Response: 2-->(E2) to pain  Best Motor Response: 4-->(M4) withdraws from pain  Best Verbal Response: 1-->(V1) none  [place] Coma Scale Score: 7  Assessment Qualifiers: patient intubated  Pupil PERRLA: yes     24hr Temp:  [99.1 °F (37.3 °C)-99.7 °F (37.6 °C)]     CV:   Rhythm: atrial rhythm  BP goals:   SBP < 140  MAP > 65    Resp:   O2 Device (Oxygen Therapy): ventilator  Vent Mode: A/C  Set Rate: 14 BPM  Oxygen Concentration (%): 40  Vt Set: 500 mL  PEEP/CPAP: 5 cmH20  Pressure Support: 5 cmH20    Plan: N/A    GI/:     Diet/Nutrition Received: tube feeding  Last Bowel Movement: 11/04/22  Voiding Characteristics: urethral catheter (bladder)    Intake/Output Summary (Last 24 hours) at 11/7/2022 0533  Last data filed at 11/7/2022 0501  Gross per 24 hour   Intake 1933.72 ml   Output 1285 ml   Net 648.72 ml          Labs/Accuchecks:  Recent Labs   Lab 11/07/22  0305   WBC 13.55*   RBC 2.36*   HGB 7.3*   HCT 22.3*   *      Recent Labs   Lab 11/07/22  0214      K 3.6   CO2 22*      BUN 8   CREATININE 0.6   ALKPHOS 353*   ALT 10   AST 28   BILITOT 0.8       Recent Labs   Lab 11/06/22  0259 11/07/22  0305   INR 1.2  --    APTT 29.9 30.0      Recent Labs   Lab 11/05/22  0632 11/05/22  1201 11/06/22  0003     --   --    CPKMB 2.4  --   --    TROPONINI 0.141*   < > 0.053*   MB 2.1  --   --     < > = values in this interval not displayed.       Electrolytes: N/A - electrolytes WDL  Accuchecks: Q1H    Gtts:   sodium chloride 0.9% 100 mL/hr at 11/06/22 2206    clevidipine 5 mg/hr (11/07/22 0503)    fentanyl 75 mcg/hr (11/06/22 1801)    insulin regular 1 units/mL infusion orderable (DKA) 2.9 Units/hr (11/06/22 2001)       LDA/Wounds:  Lines/Drains/Airways       Drain  Duration                  NG/OG Tube 11/05/22 0333 orogastric 18 Fr. Center mouth 2 days         Urethral Catheter 11/05/22 0630 Non-latex 16 Fr. 2 days         Closed/Suction Drain 11/05/22 1741 Right;Anterior Groin Bulb 15 Fr. 1 day         Closed/Suction Drain 11/05/22 1742 Right;Anterior Hip Bulb 15 Fr. 1 day              Airway  Duration                  Airway - Non-Surgical 11/05/22 0328 2 days              Peripheral Intravenous Line  Duration                  Peripheral IV - Single Lumen 11/04/22 2153 18 G Left Hand 2 days         Peripheral IV - Single Lumen 11/05/22 18 G Anterior;Proximal;Right Forearm 2 days         Peripheral IV - Single Lumen 11/05/22 20 G Anterior;Right Upper Arm 2 days                  Wounds: Yes  Wound care consulted: Yes

## 2022-11-07 NOTE — HPI
Mr. Luis Sorto Jr. is a 76 y.o. male with PMHx of AFib (on Eliquis),CAD, HTN, DM II, s/p LAAO w/Amulet procedure on 11/04/2022 who presents as a transfer from [facility] to Neuro Critical Care s/p [facility] for evaluation of L MCA stroke w/ICH.     He had been discharged home s/p LAAO w/Amulet device on 11/4/2022 around 1000 am.  He acutely developed confusion with difficulty walking and standing, difficulty conversing, and difficulty finding his bathroom.  There were no reported preceding symptoms and nothing was reported to exacerbate or alleviate the symptoms. He presented to [facility] ED where a CTH was obtained and was negative for acute findings.  Telestroke consult was performed by . TNKase was administered as the patient had been off of Eliquis >24h .  Of note, per the ED record patient was noted to have increased pain and swelling in the right groin in the area of the venous access from his Cardiology procedure and then the patient began to have bleeding and bruising in the anterior thigh on he right side. Patient became hemodynamically unstable and was started on Levophed.     CT Head was ordered and showed Left ICH. Patient was intubated at outside hospital. Patient was then transferred to [facility] for a higher level of care.     Patient is now s/p R pseudoaneurysm repair on 11/5/22. Patient with slightly worsening ICH overnight, exam unchanged. Some oozing from the R groin site, however improved after holding pressure and applying a sandbag. Patient off pressors, fluids increased. TYSON pending due to Echo findings and concern for possible thrombus. Pt remains intubated and sedated.     11/5/22 TTE   Discussed findings with attending  In the light of clinical history and CT brain please oder TYSON to r/o clot on amulet device.  The following segments are hypokinetic: mid inferoseptal, apical septal and apex. Small dyskinetic apical cap with  no thrombus seen. All other segments are normal.  The estimated ejection fraction is 45%.  The left ventricle is normal in size with concentric remodeling and mildly decreased systolic function.  Left ventricular diastolic dysfunction.  Normal right ventricular size with normal right ventricular systolic function.  Severe left atrial enlargement.  The estimated PA systolic pressure is 28 mmHg.  Normal central venous pressure (3 mmHg).

## 2022-11-07 NOTE — H&P
[facility] - Neuro Critical Care  Cardiology  History and Physical     Patient Name: Luis Sorto Jr.  MRN: [id number]  Admission Date: 11/5/2022  Code Status: Full Code   Attending Provider: Luis Hudson MD   Primary Care Physician: Lui Santamaria MD  Principal Problem:Cerebrovascular accident (CVA) due to embolism of left middle cerebral artery    Patient information was obtained from patient and ER records.     Subjective:     Chief Complaint:  PETER closure device      HPI:  Mr. Luis Sorto Jr. is a 76 y.o. male with PMHx of AFib (on Eliquis),CAD, HTN, DM II, s/p LAAO w/Amulet procedure on 11/04/2022 who presents as a transfer from [facility] to Neuro Critical Care s/p [facility] for evaluation of L MCA stroke w/ICH.     He had been discharged home s/p LAAO w/Amulet device on 11/4/2022 around 1000 am.  He acutely developed confusion with difficulty walking and standing, difficulty conversing, and difficulty finding his bathroom.  There were no reported preceding symptoms and nothing was reported to exacerbate or alleviate the symptoms. He presented to [facility] ED where a CTH was obtained and was negative for acute findings.  Telestroke consult was performed by . TNKase was administered as the patient had been off of Eliquis >24h .  Of note, per the ED record patient was noted to have increased pain and swelling in the right groin in the area of the venous access from his Cardiology procedure and then the patient began to have bleeding and bruising in the anterior thigh on he right side. Patient became hemodynamically unstable and was started on Levophed.     CT Head was ordered and showed Left ICH. Patient was intubated at outside hospital. Patient was then transferred to [facility] for a higher level of care.     Patient is now s/p R pseudoaneurysm repair on 11/5/22.  TYSON ordered given Echo findings and concern for possible thrombus. Pt  remains intubated and sedated.     11/5/22 TTE   Discussed findings with attending  In the light of clinical history and CT brain please oder TYSON to r/o clot on amulet device.  The following segments are hypokinetic: mid inferoseptal, apical septal and apex. Small dyskinetic apical cap with no thrombus seen. All other segments are normal.  The estimated ejection fraction is 45%.  The left ventricle is normal in size with concentric remodeling and mildly decreased systolic function.  Left ventricular diastolic dysfunction.  Normal right ventricular size with normal right ventricular systolic function.  Severe left atrial enlargement.  The estimated PA systolic pressure is 28 mmHg.  Normal central venous pressure (3 mmHg).            Past Medical History:   Diagnosis Date    A-fib     Anticoagulant long-term use     Coronary artery disease     Diabetes mellitus     no meds    Digestive disorder     Hypertension     Paroxysmal atrial fibrillation     Renal disorder     kidney stone    Sleep apnea     c pap machine used    Stroke     tia    Unspecified glaucoma        Past Surgical History:   Procedure Laterality Date    CARDIOVERSION      CYSTOSCOPY      EXCLUSION OF LEFT ATRIAL APPENDAGE N/A 11/4/2022    Procedure: EXCLUSION, LEFT ATRIAL APPENDAGE;  Surgeon: Dallas Gold MD;  Location: [facility] CATH;  Service: Cardiology;  Laterality: N/A;    EYE SURGERY      JOINT REPLACEMENT Bilateral     REPAIR, PSEUDOANEURYSM, ARTERY, FEMORAL Right 11/5/2022    Procedure: REPAIR, PSEUDOANEURYSM, ARTERY, FEMORAL;  Surgeon: Simba Turner MD;  Location: [facility] OR [address] [place] [address];  Service: Vascular;  Laterality: Right;    TRANSESOPHAGEAL ECHOCARDIOGRAPHY N/A 11/3/2022    Procedure: ECHOCARDIOGRAM, TRANSESOPHAGEAL;  Surgeon: Ahmet Bowers MD;  Location: [facility] CATH;  Service: Cardiology;  Laterality: N/A;    TRANSESOPHAGEAL ECHOCARDIOGRAPHY N/A 11/4/2022    Procedure: ECHOCARDIOGRAM, TRANSESOPHAGEAL;  Surgeon: Noel Carpenter MD;  Location:  [facility] CATH;  Service: Cardiovascular;  Laterality: N/A;       Review of patient's allergies indicates:  No Known Allergies    No current facility-administered medications on file prior to encounter.     Current Outpatient Medications on File Prior to Encounter   Medication Sig    aspirin 81 MG Chew Take 1 tablet (81 mg total) by mouth once daily.    atorvastatin (LIPITOR) 40 MG tablet Take 40 mg by mouth once daily.    cholecalciferol, vitamin D3, (VITAMIN D3) 50 mcg (2,000 unit) Cap capsule Take 2,000 Units by mouth once daily.    clopidogreL (PLAVIX) 75 mg tablet Take 1 tablet (75 mg total) by mouth once daily.    metoprolol tartrate (LOPRESSOR) 25 MG tablet 25 mg 2 (two) times a day.    multivit-min/iron/folic/vit K1 (CENTRUM CHEWABLES ORAL) Take by mouth.    NON FORMULARY MEDICATION Prevagen- 1 tab daily    pantoprazole (PROTONIX) 40 MG tablet Take 1 tablet (40 mg total) by mouth once daily.    propafenone (RHTHYMOL) 150 MG Tab Take 150 mg by mouth every 8 (eight) hours.    ramipriL (ALTACE) 10 MG capsule Take 10 mg by mouth once daily.    ramipriL (ALTACE) 5 MG capsule Take 2 capsules (10 mg total) by mouth once daily. Home dose    tamsulosin (FLOMAX) 0.4 mg Cap Take 0.4 mg by mouth every evening.    travoprost (TRAVATAN Z) 0.004 % ophthalmic solution Place 1 drop into both eyes every evening.    travoprost, benzalkonium, (TRAVATAN) 0.004 % ophthalmic solution Place 1 drop into both eyes every evening.     Family History       Problem Relation (Age of Onset)    Arthritis Mother    Diabetes Mother    Heart disease Mother          Tobacco Use    Smoking status: Never    Smokeless tobacco: Not on file   Substance and Sexual Activity    Alcohol use: No    Drug use: No    Sexual activity: Never     Review of Systems   Unable to perform ROS: Intubated   Objective:     Vital Signs (Most Recent):  Temp: 99.1 °F (37.3 °C) (11/07/22 0900)  Pulse: 84 (11/07/22 0900)  Resp: 14 (11/07/22 0900)  BP: (!) 108/57 (11/07/22  0900)  SpO2: 98 % (11/07/22 0900)   Vital Signs (24h Range):  Temp:  [99 °F (37.2 °C)-99.7 °F (37.6 °C)] 99.1 °F (37.3 °C)  Pulse:  [] 84  Resp:  [14-16] 14  SpO2:  [97 %-98 %] 98 %  BP: (108-181)/(56-73) 108/57     Weight: 116.6 kg (257 lb)  Body mass index is 37.95 kg/m².    SpO2: 98 %  O2 Device (Oxygen Therapy): ventilator      Intake/Output Summary (Last 24 hours) at 11/7/2022 0948  Last data filed at 11/7/2022 0900  Gross per 24 hour   Intake 3067.56 ml   Output 1360 ml   Net 1707.56 ml       Lines/Drains/Airways       Drain  Duration                  NG/OG Tube 11/05/22 0333 orogastric 18 Fr. Center mouth 2 days         Urethral Catheter 11/05/22 0630 Non-latex 16 Fr. 2 days         Closed/Suction Drain 11/05/22 1741 Right;Anterior Groin Bulb 15 Fr. 1 day         Closed/Suction Drain 11/05/22 1742 Right;Anterior Hip Bulb 15 Fr. 1 day              Airway  Duration                  Airway - Non-Surgical 11/05/22 0328 2 days              Peripheral Intravenous Line  Duration                  Peripheral IV - Single Lumen 11/04/22 2153 18 G Left Hand 2 days         Peripheral IV - Single Lumen 11/05/22 18 G Anterior;Proximal;Right Forearm 2 days         Peripheral IV - Single Lumen 11/05/22 20 G Anterior;Right Upper Arm 2 days                    Physical Exam  Constitutional:       General: He is not in acute distress.     Appearance: Normal appearance. He is ill-appearing.   HENT:      Head: Normocephalic and atraumatic.      Nose: Nose normal.      Mouth/Throat:      Pharynx: Oropharynx is clear.   Eyes:      Pupils: Pupils are equal, round, and reactive to light.   Cardiovascular:      Rate and Rhythm: Normal rate and regular rhythm.      Heart sounds: No murmur heard.    No friction rub. No gallop.   Pulmonary:      Effort: Pulmonary effort is normal.      Breath sounds: Normal breath sounds.      Comments: Intubated   Abdominal:      General: Abdomen is flat. Bowel sounds are normal. There is no  distension.      Palpations: Abdomen is soft. There is no mass.   Musculoskeletal:         General: No swelling. Normal range of motion.      Cervical back: Normal range of motion.   Skin:     General: Skin is warm and dry.      Coloration: Skin is not jaundiced or pale.   Neurological:      General: No focal deficit present.       Significant Labs: BMP:   Recent Labs   Lab 11/06/22  0259 11/07/22  0214   * 147*    139   K 4.2 3.6    110   CO2 22* 22*   BUN 12 8   CREATININE 0.7 0.6   CALCIUM 8.1* 8.1*   MG 1.8 1.9    and CBC   Recent Labs   Lab 11/06/22  0259 11/06/22  1121 11/07/22  0305   WBC 17.40* 15.53* 13.55*   HGB 10.0* 9.1* 7.3*   HCT 30.2* 28.5* 22.3*    146* 131*       Significant Imaging: Reviewed     Assessment and Plan:     Presence of Amulet left atrial appendage closure device  1. TYSON for evaluation of PETER and Amulet device   -No absolute contraindications of esophageal stricture, tumor, perforation, laceration,or diverticulum and/or active GI bleed  -The risks, benefits & alternatives of the procedure were explained to the patient.   -The risks of transesophageal echo include but are not limited to:  Dental trauma, esophageal trauma/perforation, bleeding, laryngospasm/brochospasm, aspiration, sore throat/hoarseness, & dislodgement of the endotracheal tube/nasogastric tube (where applicable).    -The risks of moderate sedation include hypotension, respiratory depression, arrhythmias, bronchospasm, & death.    -Informed consent was obtained. The patient is agreeable to proceed with the procedure and all questions and concerns addressed.    Case discussed with an attending in echocardiography lab.     Further recommendations per attending addendum          VTE Risk Mitigation (From admission, onward)           Ordered     Reason for No Pharmacological VTE Prophylaxis  Once        Question:  Reasons:  Answer:  Active Bleeding    11/05/22 0625     IP VTE HIGH RISK PATIENT  Once          11/05/22 0625     Place sequential compression device  Until discontinued         11/05/22 0625                    Guerrero Villegas MD  Cardiology   [facility] - Neuro Critical [facility]

## 2022-11-07 NOTE — ASSESSMENT & PLAN NOTE
Intubated at the outside hospital for airway protection  Weaning settings as tolerated, currently on minimal vent settings  Daily ABG, CXR  Continue famotidine, peridex  SBT daily    Vent Mode: A/C  Oxygen Concentration (%):  [40] 40  Resp Rate Total:  [14 br/min-16 br/min] 16 br/min  Vt Set:  [500 mL] 500 mL  PEEP/CPAP:  [5 cmH20] 5 cmH20  Pressure Support:  [5 cmH20] 5 cmH20  Mean Airway Pressure:  [8.5 wiH41-29 cmH20] 11 cmH20

## 2022-11-07 NOTE — ASSESSMENT & PLAN NOTE
Echo shows:   The following segments are hypokinetic: mid inferoseptal, apical septal and apex. Small dyskinetic apical cap with no thrombus seen. All other segments are normal.   The estimated ejection fraction is 45%.   The left ventricle is normal in size with concentric remodeling and mildly decreased systolic function.   Left ventricular diastolic dysfunction.   Normal right ventricular size with normal right ventricular systolic function.   Severe left atrial enlargement.   The estimated PA systolic pressure is 28 mmHg.   Normal central venous pressure (3 mmHg).    TYSON 11/7 shows no thrombus, improved EF of 55%

## 2022-11-07 NOTE — CONSULTS
"  [facility] - Neuro Critical Care  Adult Nutrition  Consult Note    SUMMARY     Recommendations    When medically able, initiate TF regimen of Glucerna 1.5 @ 55 ml/hr- provides 1980 kcals, 109 g pro, and 1002 ml free water.     If able to tolerate PO intake, ADAT to diabetic diet- texture per SLP.    RD following.    Goals: Will meet % EEN/EPN by next RD visit.  Nutrition Goal Status: new  Communication of RD Recs:  (POC)    Assessment and Plan    Nutrition Problem  Inadequate oral intake     Related to (etiology):   Inability to consume sufficient needs     Signs and Symptoms (as evidenced by):   NPO status      Interventions/Recommendations (treatment strategy):  Collaboration of nutrition care with other providers   EN     Nutrition Diagnosis Status:   New    Reason for Assessment    Reason For Assessment: consult  Diagnosis: stroke/CVA  Relevant Medical History: Persistent Afib, ICH, T2DM, HLD, lactic acidosis, acute systolic HF, oral phase dysphagia, GERD, CAD  Interdisciplinary Rounds: did not attend  General Information Comments: RD consulted for TF recs. Unable to speak with pt 2/2 being on vent/intubated. S/p R pesudoanurysm repair on 11/5. Noted TF order and that TF is currently clamped. No pert wt hx per chart review. NFPE not warranted 2/2 appearing well-nourished. LBM 11/4.  Nutrition Discharge Planning: Pending hospital course    Nutrition Risk Screen    Nutrition Risk Screen: dysphagia or difficulty swallowing, tube feeding or parenteral nutrition    Nutrition/Diet History    Food Allergies: NKFA  Factors Affecting Nutritional Intake: NPO, on mechanical ventilation, difficulty/impaired swallowing    Anthropometrics    Temp: 99.5 °F (37.5 °C)  Height Method: Estimated  Height: 5' 9" (175.3 cm)  Height (inches): 69 in  Weight Method: Bed Scale  Weight: 116.6 kg (257 lb)  Weight (lb): 257 lb  Ideal Body Weight (IBW), Male: 160 lb  % Ideal Body Weight, Male (lb): 160.63 %  BMI (Calculated): 37.9  BMI " Grade: 35 - 39.9 - obesity - grade II     Lab/Procedures/Meds    Pertinent Labs Reviewed: reviewed  Pertinent Labs Comments: Glucose 147, A1C 6.1, Albumin 2.0, Ca 8.1, Phos 1.7, Al Phos 353  Pertinent Medications Reviewed: reviewed  Pertinent Medications Comments: atorvastatin, famotidine, metoprolol tartrate, senna-docusate, fentanyl, insulin    Estimated/Assessed Needs    Weight Used For Calorie Calculations: 116.6 kg (257 lb 0.9 oz)  Energy Calorie Requirements (kcal): 1915 kcals  Energy Need Method: [place] (modified)  Protein Requirements: 109-145 g (1.5-2.0 g/kg)  Weight Used For Protein Calculations: 72.6 kg (160 lb)  Fluid Requirements (mL): 1 ml or fluid per MD  Estimated Fluid Requirement Method: RDA Method  RDA Method (mL): 1915  CHO Requirement: 239 g    Nutrition Prescription Ordered    Current Diet Order: NPO    Evaluation of Received Nutrient/Fluid Intake    I/O: +3.0 L since admit  % Intake of Estimated Energy Needs: 0%  % Meal Intake: NPO    Nutrition Risk    Level of Risk/Frequency of Follow-up:  (1 time/week)     Monitor and Evaluation    Food and Nutrient Intake: enteral nutrition intake  Food and Nutrient Adminstration: enteral and parenteral nutrition administration  Knowledge/Beliefs/Attitudes: food and nutrition knowledge/skill, beliefs and attitudes  Physical Activity and Function: nutrition-related ADLs and IADLs  Anthropometric Measurements: height/length, weight change, body mass index, weight  Biochemical Data, Medical Tests and Procedures: electrolyte and renal panel, gastrointestinal profile, glucose/endocrine profile, lipid profile, inflammatory profile  Nutrition-Focused Physical Findings: overall appearance     Nutrition Follow-Up    RD Follow-up?: Yes    Francia Benson PL-LDN

## 2022-11-07 NOTE — PLAN OF CARE
[facility] Care Plan    POC reviewed with Luis Sorto Jr. and family at 1400. Pt verbalized understanding. Questions and concerns addressed. No acute events today. Pt progressing toward goals. Will continue to monitor. See below and flowsheets for full assessment and VS info.     TTE completed  Oozing from R groin site. Sanguinous and dime sized- Vascular surgery notified and visualized. Placed sand bag on site  L groin sheath removed   Fentanyl gtt @75   Fent 50 mcg  push X 2  Insulin gtt continued and titrated throughout shift  Cleviprex off        Is this a stroke patient? yes- Stroke booklet reviewed with patient and family, risk factors identified for patient and stroke booklet remains at bedside for ongoing education.     Neuro:  [place] Coma Scale  Best Eye Response: 2-->(E2) to pain  Best Motor Response: 4-->(M4) withdraws from pain  Best Verbal Response: 1-->(V1) none  Carlton Coma Scale Score: 7  Assessment Qualifiers: patient intubated  Pupil PERRLA: yes     24 hr Temp:  [99 °F (37.2 °C)-99.7 °F (37.6 °C)]     CV:   Rhythm: atrial rhythm  BP goals:   SBP < 140  MAP > 65    Resp:   O2 Device (Oxygen Therapy): ventilator  Vent Mode: A/C  Set Rate: 16 BPM  Oxygen Concentration (%): 40  Vt Set: 500 mL  PEEP/CPAP: 5 cmH20  Pressure Support: 5 cmH20    Plan: N/A    GI/:     Diet/Nutrition Received: NPO  Last Bowel Movement: 11/04/22  Voiding Characteristics: urethral catheter (bladder)    Intake/Output Summary (Last 24 hours) at 11/7/2022 1740  Last data filed at 11/7/2022 1735  Gross per 24 hour   Intake 2873.65 ml   Output 1420 ml   Net 1453.65 ml          Labs/Accuchecks:  Recent Labs   Lab 11/07/22  0305   WBC 13.55*   RBC 2.36*   HGB 7.3*   HCT 22.3*   *      Recent Labs   Lab 11/07/22  0214      K 3.6   CO2 22*      BUN 8   CREATININE 0.6   ALKPHOS 353*   ALT 10   AST 28   BILITOT 0.8      Recent Labs   Lab 11/06/22  0259 11/07/22  0305   INR 1.2  --    APTT 29.9 30.0      Recent Labs    Lab 11/05/22  0632 11/05/22  1201 11/06/22  0003     --   --    CPKMB 2.4  --   --    TROPONINI 0.141*   < > 0.053*   MB 2.1  --   --     < > = values in this interval not displayed.       Electrolytes: Electrolytes replaced  Accuchecks: q1h      Gtts:   sodium chloride 0.9% 50 mL/hr at 11/07/22 1700    clevidipine Stopped (11/07/22 1005)    fentanyl 75 mcg/hr (11/07/22 1700)    insulin regular 1 units/mL infusion orderable (DKA) 0.6 Units/hr (11/07/22 1700)       LDA/Wounds:  Lines/Drains/Airways       Drain  Duration                  Closed/Suction Drain 11/05/22 1741 Right;Anterior Groin Bulb 15 Fr. 2 days         Closed/Suction Drain 11/05/22 1742 Right;Anterior Hip Bulb 15 Fr. 2 days         NG/OG Tube 11/05/22 0333 orogastric 18 Fr. Center mouth 2 days         Urethral Catheter 11/05/22 0630 Non-latex 16 Fr. 2 days              Airway  Duration                  Airway - Non-Surgical 11/05/22 0328 2 days              Peripheral Intravenous Line  Duration                  Peripheral IV - Single Lumen 11/05/22 18 G Anterior;Proximal;Right Forearm 2 days         Peripheral IV - Single Lumen 11/05/22 20 G Anterior;Right Upper Arm 2 days                  Wounds: Yes  Wound care consulted: Yes

## 2022-11-07 NOTE — ASSESSMENT & PLAN NOTE
76M h/o AFib on Eliquis,CAD, HTN, DM II, LAAO w/Amulet procedure on 11/04/2022, who presents as a transfer from [facility] s/p [facility] for evaluation of L MCA stroke w/ICH (ICH Score 1). OSH CTH with multifocal left ICH. CTA negative for vascular lesions. Patient was intubated at OSH. Cryo was ordered at the outside hospital but not given. On arrival to [facility], CTH repeat with occipital ICH component decompressing into occipital horn. No HCP. Bleeds grossly stable.     --Patient admitted to [facility] on telemetry      -q1h neurochecks in ICU, q2h neurochecks in stepdown, q4h neurochecks on floor  --All labs and diagnostics reviewed  --MRI Brain w/wo to evaluate for underlying lesion on 11/5 showing stable hemorrhage; multifocal expansile T2/FLAIR hyperintense signal with associated restricted diffusion disproportionately affecting the posterior cerebral hemispheric cortex adjoining white matter (concerning for recent ischemia versus PRES), and restricted diffusion in R cerebellum (concerning for recent ischemia)  --CTH 11/6 showing persistent hemorrhage, stable on same day repeat  --Hold anti-plt/coag medications  --SBP <140 (cardene ggt; hydralazine & labetalol PRN; transition to home meds when appropriate)  --Na >145-55  --Keppra 500 BID  --HOB >30  --cvEEG as needed per NCC  --Continue to monitor clinically, notify NSGY immediately with any changes in neuro status    Dispo: ICU

## 2022-11-07 NOTE — RESPIRATORY THERAPY
Pt was weaned off of some of his sedation and he began to bite on the ETT so a bite block was placed to avoid him hurting himself. Nurse did have to give more medication so that it could be placed.

## 2022-11-07 NOTE — ASSESSMENT & PLAN NOTE
Possible stroke s/p Tenecteplase with new Left ICH   -- AFib (on Eliquis)  -- s/p LAAO w/Amulet procedure on 11/04/2022  s/p TNKase  -- Continue Neuro checks q 1hr  -- Vascular Neurology consulted  -- Neurosurgery consulted  -- SBP goal <140 (Now with new Left ICH)  -- PT/OT/Speech  -- Atorvastatin 40 mg  -- Slight increase in size of bleed overnight 11/5, no change in exam  -- No surgical intervention at this time per NSGY team

## 2022-11-07 NOTE — ASSESSMENT & PLAN NOTE
-- s/p LAAO w/Amulet procedure on 11/04/2022  s/p TNKase  -- Groin swelling continuous to increase in size  -- Sand bags and manual pressure applied   -- Vascular surgery consulted  -- See pseudoaneurysm

## 2022-11-07 NOTE — ANESTHESIA POSTPROCEDURE EVALUATION
Anesthesia Post Evaluation    Patient: Luis Sorto Jr.    Procedure(s) Performed: Procedure(s) (LRB):  ECHOCARDIOGRAM,TRANSESOPHAGEAL (N/A)    Final Anesthesia Type: general      Patient location during evaluation: ICU  Patient participation: No - Unable to Participate, Intubation  Level of consciousness: sedated  Post-procedure vital signs: reviewed and stable  Pain management: adequate  Airway patency: patent    PONV status at discharge: No PONV  Anesthetic complications: no      Cardiovascular status: blood pressure returned to baseline  Respiratory status: ventilator and ETT  Hydration status: euvolemic  Follow-up not needed.              No case tracking events are documented in the log.      Pain/Abelino Score: Pain Rating Prior to Med Admin: 0 (11/7/2022  2:04 PM)

## 2022-11-07 NOTE — SUBJECTIVE & OBJECTIVE
Past Medical History:   Diagnosis Date    A-fib     Anticoagulant long-term use     Coronary artery disease     Diabetes mellitus     no meds    Digestive disorder     Hypertension     Paroxysmal atrial fibrillation     Renal disorder     kidney stone    Sleep apnea     c pap machine used    Stroke     tia    Unspecified glaucoma        Past Surgical History:   Procedure Laterality Date    CARDIOVERSION      CYSTOSCOPY      EXCLUSION OF LEFT ATRIAL APPENDAGE N/A 11/4/2022    Procedure: EXCLUSION, LEFT ATRIAL APPENDAGE;  Surgeon: Dallas Gold MD;  Location: [facility] CATH;  Service: Cardiology;  Laterality: N/A;    EYE SURGERY      JOINT REPLACEMENT Bilateral     REPAIR, PSEUDOANEURYSM, ARTERY, FEMORAL Right 11/5/2022    Procedure: REPAIR, PSEUDOANEURYSM, ARTERY, FEMORAL;  Surgeon: Simba Turner MD;  Location: [facility] OR [address] [place] [address];  Service: Vascular;  Laterality: Right;    TRANSESOPHAGEAL ECHOCARDIOGRAPHY N/A 11/3/2022    Procedure: ECHOCARDIOGRAM, TRANSESOPHAGEAL;  Surgeon: Ahmet Bowers MD;  Location: [facility] CATH;  Service: Cardiology;  Laterality: N/A;    TRANSESOPHAGEAL ECHOCARDIOGRAPHY N/A 11/4/2022    Procedure: ECHOCARDIOGRAM, TRANSESOPHAGEAL;  Surgeon: Noel Carpenter MD;  Location: [facility] CATH;  Service: Cardiovascular;  Laterality: N/A;       Review of patient's allergies indicates:  No Known Allergies    No current facility-administered medications on file prior to encounter.     Current Outpatient Medications on File Prior to Encounter   Medication Sig    aspirin 81 MG Chew Take 1 tablet (81 mg total) by mouth once daily.    atorvastatin (LIPITOR) 40 MG tablet Take 40 mg by mouth once daily.    cholecalciferol, vitamin D3, (VITAMIN D3) 50 mcg (2,000 unit) Cap capsule Take 2,000 Units by mouth once daily.    clopidogreL (PLAVIX) 75 mg tablet Take 1 tablet (75 mg total) by mouth once daily.    metoprolol tartrate (LOPRESSOR) 25 MG tablet 25 mg 2 (two) times a day.    multivit-min/iron/folic/vit K1 (CENTRUM  CHEWABLES ORAL) Take by mouth.    NON FORMULARY MEDICATION Prevagen- 1 tab daily    pantoprazole (PROTONIX) 40 MG tablet Take 1 tablet (40 mg total) by mouth once daily.    propafenone (RHTHYMOL) 150 MG Tab Take 150 mg by mouth every 8 (eight) hours.    ramipriL (ALTACE) 10 MG capsule Take 10 mg by mouth once daily.    ramipriL (ALTACE) 5 MG capsule Take 2 capsules (10 mg total) by mouth once daily. Home dose    tamsulosin (FLOMAX) 0.4 mg Cap Take 0.4 mg by mouth every evening.    travoprost (TRAVATAN Z) 0.004 % ophthalmic solution Place 1 drop into both eyes every evening.    travoprost, benzalkonium, (TRAVATAN) 0.004 % ophthalmic solution Place 1 drop into both eyes every evening.     Family History       Problem Relation (Age of Onset)    Arthritis Mother    Diabetes Mother    Heart disease Mother          Tobacco Use    Smoking status: Never    Smokeless tobacco: Not on file   Substance and Sexual Activity    Alcohol use: No    Drug use: No    Sexual activity: Never     Review of Systems   Unable to perform ROS: Intubated   Objective:     Vital Signs (Most Recent):  Temp: 99.1 °F (37.3 °C) (11/07/22 0900)  Pulse: 84 (11/07/22 0900)  Resp: 14 (11/07/22 0900)  BP: (!) 108/57 (11/07/22 0900)  SpO2: 98 % (11/07/22 0900)   Vital Signs (24h Range):  Temp:  [99 °F (37.2 °C)-99.7 °F (37.6 °C)] 99.1 °F (37.3 °C)  Pulse:  [] 84  Resp:  [14-16] 14  SpO2:  [97 %-98 %] 98 %  BP: (108-181)/(56-73) 108/57     Weight: 116.6 kg (257 lb)  Body mass index is 37.95 kg/m².    SpO2: 98 %  O2 Device (Oxygen Therapy): ventilator      Intake/Output Summary (Last 24 hours) at 11/7/2022 0948  Last data filed at 11/7/2022 0900  Gross per 24 hour   Intake 3067.56 ml   Output 1360 ml   Net 1707.56 ml       Lines/Drains/Airways       Drain  Duration                  NG/OG Tube 11/05/22 0333 orogastric 18 Fr. Center mouth 2 days         Urethral Catheter 11/05/22 0630 Non-latex 16 Fr. 2 days         Closed/Suction Drain 11/05/22 7525  Right;Anterior Groin Bulb 15 Fr. 1 day         Closed/Suction Drain 11/05/22 1742 Right;Anterior Hip Bulb 15 Fr. 1 day              Airway  Duration                  Airway - Non-Surgical 11/05/22 0328 2 days              Peripheral Intravenous Line  Duration                  Peripheral IV - Single Lumen 11/04/22 2153 18 G Left Hand 2 days         Peripheral IV - Single Lumen 11/05/22 18 G Anterior;Proximal;Right Forearm 2 days         Peripheral IV - Single Lumen 11/05/22 20 G Anterior;Right Upper Arm 2 days                    Physical Exam  Constitutional:       General: He is not in acute distress.     Appearance: Normal appearance. He is ill-appearing.   HENT:      Head: Normocephalic and atraumatic.      Nose: Nose normal.      Mouth/Throat:      Pharynx: Oropharynx is clear.   Eyes:      Pupils: Pupils are equal, round, and reactive to light.   Cardiovascular:      Rate and Rhythm: Normal rate and regular rhythm.      Heart sounds: No murmur heard.    No friction rub. No gallop.   Pulmonary:      Effort: Pulmonary effort is normal.      Breath sounds: Normal breath sounds.      Comments: Intubated   Abdominal:      General: Abdomen is flat. Bowel sounds are normal. There is no distension.      Palpations: Abdomen is soft. There is no mass.   Musculoskeletal:         General: No swelling. Normal range of motion.      Cervical back: Normal range of motion.   Skin:     General: Skin is warm and dry.      Coloration: Skin is not jaundiced or pale.   Neurological:      General: No focal deficit present.       Significant Labs: BMP:   Recent Labs   Lab 11/06/22  0259 11/07/22  0214   * 147*    139   K 4.2 3.6    110   CO2 22* 22*   BUN 12 8   CREATININE 0.7 0.6   CALCIUM 8.1* 8.1*   MG 1.8 1.9    and CBC   Recent Labs   Lab 11/06/22  0259 11/06/22  1121 11/07/22  0305   WBC 17.40* 15.53* 13.55*   HGB 10.0* 9.1* 7.3*   HCT 30.2* 28.5* 22.3*    146* 131*       Significant Imaging: Reviewed

## 2022-11-07 NOTE — SUBJECTIVE & OBJECTIVE
Review of Systems: Unable to obtain a complete ROS due to level of consciousness.     Vitals:   Temp: 99.7 °F (37.6 °C)  Pulse: 100  Rhythm: atrial rhythm  BP: (S) (!) 148/66 (pt agitated fentanyl given seen mar)  MAP (mmHg): 85  CVP (mean): 100 mmHg  Resp: 16  SpO2: 98 %  Oxygen Concentration (%): 40  O2 Device (Oxygen Therapy): ventilator  Vent Mode: A/C  Set Rate: 16 BPM  Vt Set: 500 mL  Pressure Support: 5 cmH20  PEEP/CPAP: 5 cmH20  Peak Airway Pressure: 25 cmH2O  Mean Airway Pressure: 11 cmH20  Plateau Pressure: 0 cmH20    Temp  Min: 99 °F (37.2 °C)  Max: 99.7 °F (37.6 °C)  Pulse  Min: 82  Max: 106  BP  Min: 108/57  Max: 181/73  MAP (mmHg)  Min: 77  Max: 110  CVP (mean)  Min: -45 mmHg  Max: 100 mmHg  Resp  Min: 14  Max: 22  SpO2  Min: 97 %  Max: 100 %  Oxygen Concentration (%)  Min: 40  Max: 40    11/06 0701 - 11/07 0700  In: 3029.8 [I.V.:2718.4]  Out: 1240 [Urine:1100; Drains:140]         Examination:   Constitutional: Well-nourished and -developed. No apparent distress.   Eyes: Conjunctiva clear, anicteric. Lids no lesions.  Head/Ears/Nose/Mouth/Throat/Neck: Moist mucous membranes. External ears, nose atraumatic.   Cardiovascular: Irregular rhythm. R groin hematoma with bruising throughout extending into scrotum. Wound vac in place. No leg edema.  Respiratory: Comfortable respirations. Clear to auscultation.  Gastrointestinal: Soft, nondistended, nontender. + bowel sounds.    Neurologic:  -GCS E 1 V 1t M 5  -Does not respond to voice. Grimaces to pain. Unable to follow commands.  -Cranial nerves: EOM intact, PERRL, no facial droop, + cough/gag, +corneals  -Motor: Moves L side spontaneously antigravity, R side withdraws to noxious stimuli  -Sensation: Intact to light touch  Unable to test orientation, language, memory, judgment, insight, coordination, gait due to level of consciousness.    Medications:   Continuoussodium chloride 0.9%, Last Rate: 50 mL/hr at 11/07/22 1600  clevidipine, Last Rate: Stopped  (11/07/22 1005)  fentanyl, Last Rate: 50 mcg/hr (11/07/22 1600)  insulin regular 1 units/mL infusion orderable (DKA), Last Rate: 0.6 Units/hr (11/07/22 1600)  Scheduledatorvastatin, 40 mg, Daily  famotidine, 20 mg, BID  levetiracetam IV, 500 mg, Q12H  metoprolol tartrate, 50 mg, BID  mupirocin, , BID  polyethylene glycol, 17 g, Daily  senna-docusate 8.6-50 mg, 1 tablet, BID  sodium chloride 0.9%, 3 mL, Q8H  PRNsodium chloride, , Q24H PRN  sodium chloride, , Q24H PRN  sodium chloride, , Q24H PRN  acetaminophen, 650 mg, Q6H PRN  dextrose 10%, 12.5 g, PRN  dextrose 10%, 25 g, PRN  fentaNYL, 50 mcg, Q1H PRN  labetalol, 10 mg, PRN  metoprolol, 5 mg, Q5 Min PRN  ondansetron, 4 mg, Q6H PRN     Today I independently reviewed pertinent medications, lines/drains/airways, imaging, cardiology results, laboratory results, microbiology results, notably:     ISTAT:   Recent Labs   Lab 11/07/22  0451   PH 7.428   PCO2 40.6   PO2 120*   POCSATURATED 99   HCO3 26.8   BE 2   POCTCO2 28*   SAMPLE ARTERIAL      Chem:   Recent Labs   Lab 11/07/22  0214      K 3.6      CO2 22*   *   BUN 8   CREATININE 0.6   CALCIUM 8.1*   MG 1.9   PHOS 1.7*   ANIONGAP 7*   PROT 4.9*   ALBUMIN 2.0*   BILITOT 0.8   ALKPHOS 353*   AST 28   ALT 10     Heme:   Recent Labs   Lab 11/07/22  0305   WBC 13.55*   HGB 7.3*   HCT 22.3*   *     Endo:   Recent Labs   Lab 11/07/22  1407 11/07/22  1447 11/07/22  1559   POCTGLUCOSE 141* 155* 147*

## 2022-11-07 NOTE — PLAN OF CARE
Recommendations     When medically able, initiate TF regimen of Glucerna 1.5 @ 55 ml/hr- provides 1980 kcals, 109 g pro, and 1002 ml free water.      If able to tolerate PO intake, ADAT to diabetic diet- texture per SLP.     RD following.     Goals: Will meet % EEN/EPN by next RD visit.  Nutrition Goal Status: new  Communication of RD Recs:  (POC)    Francia Benson PL-LDN

## 2022-11-07 NOTE — RESPIRATORY THERAPY
Cardiology and anesthesia just left bedside. Pt had a TYSON done a bite block was placed in pt mouth next to his ETT tube. Pulse oximetry and BP was monitored during sedation period. Pt seem to be resting comfortably at this time. Vent was placed on 100% during procedure and reduced back to previous setting after procedure was done. Peak alarm was increased to 50 instead of 40 as well during procedure. Bite block was removed and discarded after procedure. No skin damage around mouth once block was removed.

## 2022-11-07 NOTE — ASSESSMENT & PLAN NOTE
Diabetes Mellitus Type II  -- Hyperglycemic in the 300s on admit  -- Continue insulin gtt, will transition to subq insulin once on tube feedings for 24 hrs  -- HgbA1c 6.1

## 2022-11-07 NOTE — PT/OT/SLP PROGRESS
Physical Therapy      Patient Name:  Luis Sorto Jr.   MRN:  [id number]    PT consult received and acknowledged.  The patient is currently intubated and appropriate for only one therapy discipline at this time.  OT will follow this patient while they are only appropriate for in bed activities and will notify PT when the patient is ready for mobilization.  PT will follow up when patient is appropriate for EOB or OOB activities.     Sissy Dhaliwal PT, DPT  11/7/2022

## 2022-11-07 NOTE — ASSESSMENT & PLAN NOTE
1. TYSON for evaluation of PETER and Amulet device   -No absolute contraindications of esophageal stricture, tumor, perforation, laceration,or diverticulum and/or active GI bleed  -The risks, benefits & alternatives of the procedure were explained to the patient.   -The risks of transesophageal echo include but are not limited to:  Dental trauma, esophageal trauma/perforation, bleeding, laryngospasm/brochospasm, aspiration, sore throat/hoarseness, & dislodgement of the endotracheal tube/nasogastric tube (where applicable).    -The risks of moderate sedation include hypotension, respiratory depression, arrhythmias, bronchospasm, & death.    -Informed consent was obtained. The patient is agreeable to proceed with the procedure and all questions and concerns addressed.    Case discussed with an attending in echocardiography lab.     Further recommendations per attending addendum

## 2022-11-07 NOTE — ASSESSMENT & PLAN NOTE
Patient active bleeding on admit requiring vasopressors, fluid resuscitation, received 1 unit cryo and 1 unit PRBC    -Currently off pressors  -CBC and coags stable  -Will monitor for further signs of bleeding  -Resolved as of 11/7

## 2022-11-07 NOTE — PROGRESS NOTES
[facility]  Neurosurgery  Progress Note    Subjective:     History of Present Illness: 76M h/o AFib on Eliquis,CAD, HTN, DM II, LAAO w/Amulet procedure on 11/04/2022, who presents as a transfer from [facility] for evaluation of L MCA stroke w/ICH. Patient was LKN around 2000.  He had been discharged home s/p LAAO w/Amulet device on 11/4/2022 around 1000 am. He acutely developed confusion with difficulty walking and standing, difficulty conversing, and difficulty finding his bathroom.  He presented to [facility] ED where a CTH was obtained and was negative for acute findings.Telestroke rec chemical thrombolytic; administered as the patient had been off of Eliquis >24h. Of note, per the ED record patient was noted to have increased pain and swelling in the right groin in the area of the venous access from his Cardiology procedure and then the patient began to have bleeding and bruising in the anterior thigh on he right side. Patient became hemodynamically unstable and was started on Levophed. CTH with multifocal left ICH. CTA negative for vascular lesions. Patient was intubated at OSH. Cryo was ordered at the outside hospital but not given. [place] [facility] [place] with occipital ICH component decompressing into occipital horn. No HCP. Bleeds grossly stable.       Post-Op Info:  Procedure(s) (LRB):  REPAIR, PSEUDOANEURYSM, ARTERY, FEMORAL (Right)   2 Days Post-Op     Interval History:   11/7: NAEON. AFVSS. Exam stable. Na goal 145-55.     Medications:  Continuous Infusions:   sodium chloride 0.9% 100 mL/hr at 11/06/22 1019    fentanyl 75 mcg/hr (11/06/22 1019)    insulin regular 1 units/mL infusion orderable (DKA) 1.8 Units/hr (11/06/22 1019)    niCARdipine       Scheduled Meds:   atorvastatin  40 mg Per OG tube Daily    famotidine  20 mg Per OG tube BID    levetiracetam IV  500 mg Intravenous Q12H    metoprolol tartrate  50 mg Per OG tube BID     sodium chloride 0.9%  3 mL Intravenous Q8H     PRN Meds:sodium chloride, sodium chloride, sodium chloride, acetaminophen, dextrose 10%, dextrose 10%, fentaNYL, labetalol, metoprolol, ondansetron     Review of Systems  Objective:     Weight: 116.6 kg (257 lb)  Body mass index is 37.95 kg/m².  Vital Signs (Most Recent):  Temp: 99.1 °F (37.3 °C) (11/06/22 1123)  Pulse: 107 (11/06/22 1123)  Resp: 15 (11/06/22 1123)  BP: 128/64 (11/06/22 1123)  SpO2: 98 % (11/06/22 1123) Vital Signs (24h Range):  Temp:  [98.4 °F (36.9 °C)-99.3 °F (37.4 °C)] 99.1 °F (37.3 °C)  Pulse:  [] 107  Resp:  [11-15] 15  SpO2:  [90 %-100 %] 98 %  BP: ()/(47-84) 128/64  Arterial Line BP: ()/(49-59) 55/52     Date 11/06/22 0700 - 11/07/22 0659   Shift 6724-9647 9404-2929 4430-4774 24 Hour Total   INTAKE   I.V.(mL/kg) 340.7(2.9)   340.7(2.9)   NG/GT 50   50   Shift Total(mL/kg) 390.7(3.4)   390.7(3.4)   OUTPUT   Urine(mL/kg/hr) 50   50   Other 0   0   Shift Total(mL/kg) 50(0.4)   50(0.4)   Weight (kg) 116.6 116.6 116.6 116.6              Vent Mode: A/C  Oxygen Concentration (%):  [40-50] 40  Resp Rate Total:  [11 br/min-18 br/min] 14 br/min  Vt Set:  [500 mL] 500 mL  PEEP/CPAP:  [5 cmH20] 5 cmH20  Pressure Support:  [5 cmH20-10 cmH20] 5 cmH20  Mean Airway Pressure:  [7.5 cmH20-9.7 cmH20] 8.3 cmH20         Closed/Suction Drain 11/05/22 1741 Right;Anterior Groin Bulb 15 Fr. (Active)   Site Description Ecchymotic;Edema/swelling 11/06/22 0701   Dressing Type Gauze 11/06/22 0701   Dressing Status Dry;Intact 11/06/22 0701   Dressing Intervention Integrity maintained 11/06/22 0701   Drainage Sanguineous 11/06/22 0701   Status To bulb suction 11/06/22 0701   Output (mL) 40 mL 11/06/22 0501            Closed/Suction Drain 11/05/22 1742 Right;Anterior Hip Bulb 15 Fr. (Active)   Site Description Ecchymotic;Edema/swelling 11/06/22 0701   Dressing Type Gauze 11/06/22 0701   Dressing Status Dry;Intact 11/06/22 0701   Dressing Intervention  "Integrity maintained 11/06/22 0701   Drainage Sanguineous 11/06/22 0701   Status To bulb suction 11/06/22 0701   Output (mL) 40 mL 11/06/22 0501            NG/OG Tube 11/05/22 0333 orogastric 18 Fr. Center mouth (Active)   Placement Check placement verified by distal tube length measurement 11/06/22 0701   Tolerance no signs/symptoms of discomfort 11/06/22 0701   Securement secured to commercial device 11/06/22 0701   Clamp Status/Tolerance no abdominal discomfort;no abdominal distention;no emesis;no nausea;no residual;no restlessness 11/06/22 0701   Suction Setting/Drainage Method suction at;low;intermittent setting 11/05/22 1501   Insertion Site Appearance no redness, warmth, tenderness, skin breakdown, drainage 11/06/22 0701   Drainage Bile 11/05/22 1501   Flush/Irrigation flushed w/;water;no resistance met 11/05/22 1501   Intake (mL) 50 mL 11/06/22 1001   Water Bolus (mL) 30 mL 11/05/22 1001   Tube Output(mL)(Include Discarded Residual) 160 mL 11/05/22 1001   Intake (mL) - Formula Tube Feeding 0 11/06/22 0501            Urethral Catheter 11/05/22 0630 Non-latex 16 Fr. (Active)   Site Assessment Clean;Intact 11/06/22 0701   Collection Container Urimeter 11/06/22 0701   Securement Method secured to top of thigh w/ adhesive device 11/06/22 0701   Catheter Care Performed yes 11/06/22 0701   Reason for Continuing Urinary Catheterization Critically ill in ICU and requiring hourly monitoring of intake/output 11/06/22 0701   CAUTI Prevention Bundle Securement Device in place with 1" slack;Intact seal between catheter & drainage tubing;Drainage bag/urimeter off the floor;Sheeting clip in use;No dependent loops or kinks;Drainage bag/urimeter not overfilled (<2/3 full);Drainage bag/urimeter below bladder 11/06/22 0701   Output (mL) 50 mL 11/06/22 0701       Physical Exam    Neurosurgery Physical Exam  [id number]  Localizes and spontaneous on the left  Extensor posture RUE  W/d minimally RLE  PERRL        Significant " Labs:  Recent Labs   Lab 11/04/22 2131 11/05/22 0632 11/05/22  0839 11/06/22  0259   * 335*  --  177*   * 136  --  138   K 4.0 3.9 3.5 4.2    104  --  107   CO2 23 15*  --  22*   BUN 11 13  --  12   CREATININE 0.66* 1.3  --  0.7   CALCIUM 8.2* 8.3*  --  8.1*   MG  --   --   --  1.8     Recent Labs   Lab 11/05/22 0632 11/05/22  0803 11/06/22  0259 11/06/22  1121   WBC 14.86*  --  17.40* 15.53*   HGB 9.2*  --  10.0* 9.1*   HCT 29.0* 23* 30.2* 28.5*     --  151 146*     Recent Labs   Lab 11/04/22 2131 11/05/22 0632 11/06/22  0259   LABPT 16.1*  --   --    INR 1.3* 1.3* 1.2   APTT 28.2 27.8 29.9     Microbiology Results (last 7 days)       ** No results found for the last 168 hours. **          All pertinent labs from the last 24 hours have been reviewed.    Significant Diagnostics:  I have reviewed all pertinent imaging results/findings within the past 24 hours.    Assessment/Plan:     Left-sided nontraumatic intracerebral hemorrhage  76M h/o AFib on Eliquis,CAD, HTN, DM II, LAAO w/Amulet procedure on 11/04/2022, who presents as a transfer from [facility] s/p [facility] for evaluation of L MCA stroke w/ICH (ICH Score 1). OSH CTH with multifocal left ICH. CTA negative for vascular lesions. Patient was intubated at OSH. Cryo was ordered at the outside hospital but not given. [place] [facility] [place] with occipital ICH component decompressing into occipital horn. No HCP. Bleeds grossly stable.     --Patient admitted to [facility] on telemetry      -q1h neurochecks in ICU, q2h neurochecks in stepdown, q4h neurochecks on floor  --All labs and diagnostics reviewed  --MRI Brain w/wo to evaluate for underlying lesion on 11/5 showing stable hemorrhage; multifocal expansile T2/FLAIR hyperintense signal with associated restricted diffusion disproportionately affecting the posterior cerebral hemispheric cortex adjoining white matter (concerning for recent ischemia versus PRES), and  restricted diffusion in R cerebellum (concerning for recent ischemia)  --CTH 11/6 showing persistent hemorrhage, stable on same day repeat  --Hold anti-plt/coag medications  --SBP <140 (cardene ggt; hydralazine & labetalol PRN; transition to home meds when appropriate)  --Na >145-55  --Keppra 500 BID  --HOB >30  --cvEEG as needed per NCC  --Continue to monitor clinically, notify NSGY immediately with any changes in neuro status    Dispo: ICU        Evie Hernandez MD  Neurosurgery  [facility] - Neuro Critical [facility]

## 2022-11-07 NOTE — ANESTHESIA PREPROCEDURE EVALUATION
[facility]  Anesthesia Pre-Operative Evaluation         Patient Name: Luis Sorto Jr.  YOB: 1945  MRN: [id number]    SUBJECTIVE:     Pre-operative evaluation for Procedure(s) (LRB):  ECHOCARDIOGRAM,TRANSESOPHAGEAL (N/A)     11/07/2022     Luis Sorto Jr. is a 76 y.o. male w/ a significant PMHx of AFib on Eliquis, CAD, HTN, DM II, LAAO w/Amulet procedure on 11/04/2022, who presents as a transfer from [facility] for evaluation of L MCA stroke w/ICH. OSH CTH with multifocal left ICH. CTA negative for vascular lesions.     Patient was intubated at OSH. Cryo was ordered at the outside hospital but not given. On arrival to [facility], CTH repeat with occipital ICH component decompressing into occipital horn. No HCP. Bleeds grossly stable. Course c/b pseudoaneurysm R femoral artery.     Currently on clevidipine for MAP goals <140 and fentanyl drip for sedation.    Recent Labs   Lab 11/07/22  0305   WBC 13.55*   RBC 2.36*   HGB 7.3*   HCT 22.3*   *   MCV 95   MCH 30.9   MCHC 32.7         LDA:        Peripheral IV - Single Lumen 11/04/22 2153 18 G Left Hand (Active)   Site Assessment Clean;Dry;Intact 11/07/22 0700   Extremity Assessment Distal to IV No abnormal discoloration;No redness;No swelling;No warmth 11/07/22 0700   Line Status Infusing 11/07/22 0700   Dressing Status Clean;Dry;Intact 11/07/22 0700   Dressing Intervention Integrity maintained 11/07/22 0700   Dressing Change Due 11/08/22 11/07/22 0700   Site Change Due 11/08/22 11/07/22 0700   Reason Not Rotated Not due 11/07/22 0700   Number of days: 2            Peripheral IV - Single Lumen 11/05/22 18 G Anterior;Proximal;Right Forearm (Active)   Site Assessment Clean;Dry;Intact 11/07/22 0700   Extremity Assessment Distal to IV No abnormal discoloration;No redness;No swelling;No warmth  11/07/22 0700   Line Status Infusing 11/07/22 0700   Dressing Status Clean;Dry;Intact 11/07/22 0700   Dressing Intervention Integrity maintained 11/07/22 0700   Dressing Change Due 11/09/22 11/07/22 0700   Site Change Due 11/09/22 11/07/22 0700   Reason Not Rotated Not due 11/07/22 0700   Number of days: 2            Peripheral IV - Single Lumen 11/05/22 20 G Anterior;Right Upper Arm (Active)   Site Assessment Clean;Dry;Intact 11/07/22 0700   Extremity Assessment Distal to IV No abnormal discoloration;No redness;No swelling;No warmth 11/07/22 0700   Line Status Infusing 11/07/22 0700   Dressing Status Clean;Dry;Intact 11/07/22 0700   Dressing Intervention Integrity maintained 11/07/22 0700   Dressing Change Due 11/09/22 11/07/22 0700   Site Change Due 11/09/22 11/07/22 0700   Reason Not Rotated Not due 11/07/22 0301   Number of days: 2            Closed/Suction Drain 11/05/22 1741 Right;Anterior Groin Bulb 15 Fr. (Active)   Site Description Ecchymotic 11/07/22 0700   Dressing Type Gauze 11/07/22 0700   Dressing Status Dry;Intact 11/07/22 0700   Dressing Intervention Integrity maintained 11/07/22 0700   Drainage Sanguineous 11/07/22 0700   Status To bulb suction 11/07/22 0700   Output (mL) 20 mL 11/07/22 0501   Number of days: 1            Closed/Suction Drain 11/05/22 1742 Right;Anterior Hip Bulb 15 Fr. (Active)   Site Description Ecchymotic;Edema/swelling 11/07/22 0700   Dressing Type Gauze 11/07/22 0700   Dressing Status Dry;Intact 11/07/22 0700   Dressing Intervention Integrity maintained 11/07/22 0700   Drainage Sanguineous 11/07/22 0700   Status To bulb suction 11/07/22 0700   Output (mL) 10 mL 11/07/22 0501   Number of days: 1            NG/OG Tube 11/05/22 0333 orogastric 18 Fr. Center mouth (Active)   Placement Check placement verified by distal tube length measurement;placement verified by x-ray 11/07/22 0700   Tolerance no signs/symptoms of discomfort 11/07/22 0700   Securement secured to commercial device  "11/07/22 0700   Clamp Status/Tolerance clamped 11/07/22 0700   Suction Setting/Drainage Method suction at the bedside 11/07/22 0301   Insertion Site Appearance no redness, warmth, tenderness, skin breakdown, drainage 11/07/22 0301   Drainage None 11/06/22 0701   Flush/Irrigation flushed w/;water;no resistance met 11/06/22 0701   Feeding Type continuous;by pump 11/07/22 0301   Feeding Action feeding held 11/07/22 0700   Current Rate (mL/hr) 10 mL/hr 11/06/22 1901   Goal Rate (mL/hr) 40 mL/hr 11/06/22 1901   Intake (mL) 100 mL 11/07/22 0800   Water Bolus (mL) 30 mL 11/05/22 1001   Tube Output(mL)(Include Discarded Residual) 160 mL 11/05/22 1001   Formula Name Impact peptide 11/07/22 0301   Intake (mL) - Formula Tube Feeding 0 11/07/22 0601   Residual Amount (ml) 0 ml 11/06/22 0701   Number of days: 2            Urethral Catheter 11/05/22 0630 Non-latex 16 Fr. (Active)   Site Assessment Clean;Intact 11/07/22 0700   Collection Container Urimeter 11/07/22 0700   Securement Method secured to top of thigh w/ adhesive device 11/07/22 0700   Catheter Care Performed yes 11/07/22 0700   Reason for Continuing Urinary Catheterization Critically ill in ICU and requiring hourly monitoring of intake/output 11/07/22 0700   CAUTI Prevention Bundle Securement Device in place with 1" slack;Intact seal between catheter & drainage tubing;Drainage bag/urimeter off the floor;Sheeting clip in use;No dependent loops or kinks;Drainage bag/urimeter not overfilled (<2/3 full);Drainage bag/urimeter below bladder 11/07/22 0700   Output (mL) 70 mL 11/07/22 0800   Number of days: 2       Drips:    sodium chloride 0.9% 50 mL/hr at 11/07/22 1000    clevidipine 2 mg/hr (11/07/22 1000)    fentanyl 50 mcg/hr (11/07/22 1000)    insulin regular 1 units/mL infusion orderable (DKA) 2.2 Units/hr (11/07/22 1000)       Patient Active Problem List   Diagnosis    Chest pain at rest    Persistent atrial fibrillation    GERD (gastroesophageal reflux " disease)    Cerebrovascular accident (CVA) due to embolism of left middle cerebral artery    Left-sided nontraumatic intracerebral hemorrhage    Diabetes mellitus    Hyperlipidemia    Groin swelling    Pseudoaneurysm of right femoral artery    Lactic acidosis    On mechanically assisted ventilation    Acute systolic heart failure    Oral phase dysphagia    Hypovolemic shock    Presence of Amulet left atrial appendage closure device       Review of patient's allergies indicates:  No Known Allergies    Current Inpatient Medications:   atorvastatin  40 mg Per OG tube Daily    famotidine  20 mg Per OG tube BID    levetiracetam IV  500 mg Intravenous Q12H    metoprolol tartrate  50 mg Per OG tube BID    mupirocin   Nasal BID    polyethylene glycol  17 g Per NG tube Daily    senna-docusate 8.6-50 mg  1 tablet Per OG tube BID    sodium chloride 0.9%  3 mL Intravenous Q8H       No current facility-administered medications on file prior to encounter.     Current Outpatient Medications on File Prior to Encounter   Medication Sig Dispense Refill    aspirin 81 MG Chew Take 1 tablet (81 mg total) by mouth once daily.  0    atorvastatin (LIPITOR) 40 MG tablet Take 40 mg by mouth once daily.      cholecalciferol, vitamin D3, (VITAMIN D3) 50 mcg (2,000 unit) Cap capsule Take 2,000 Units by mouth once daily.      clopidogreL (PLAVIX) 75 mg tablet Take 1 tablet (75 mg total) by mouth once daily. 30 tablet 11    metoprolol tartrate (LOPRESSOR) 25 MG tablet 25 mg 2 (two) times a day.      multivit-min/iron/folic/vit K1 (CENTRUM CHEWABLES ORAL) Take by mouth.      NON FORMULARY MEDICATION Prevagen- 1 tab daily      pantoprazole (PROTONIX) 40 MG tablet Take 1 tablet (40 mg total) by mouth once daily.      propafenone (RHTHYMOL) 150 MG Tab Take 150 mg by mouth every 8 (eight) hours.      ramipriL (ALTACE) 10 MG capsule Take 10 mg by mouth once daily.      ramipriL (ALTACE) 5 MG capsule Take 2 capsules (10 mg  total) by mouth once daily. Home dose      tamsulosin (FLOMAX) 0.4 mg Cap Take 0.4 mg by mouth every evening.      travoprost (TRAVATAN Z) 0.004 % ophthalmic solution Place 1 drop into both eyes every evening.      travoprost, benzalkonium, (TRAVATAN) 0.004 % ophthalmic solution Place 1 drop into both eyes every evening.         Past Surgical History:   Procedure Laterality Date    CARDIOVERSION      CYSTOSCOPY      EXCLUSION OF LEFT ATRIAL APPENDAGE N/A 11/4/2022    Procedure: EXCLUSION, LEFT ATRIAL APPENDAGE;  Surgeon: Dallas Gold MD;  Location: [facility] CATH;  Service: Cardiology;  Laterality: N/A;    EYE SURGERY      JOINT REPLACEMENT Bilateral     REPAIR, PSEUDOANEURYSM, ARTERY, FEMORAL Right 11/5/2022    Procedure: REPAIR, PSEUDOANEURYSM, ARTERY, FEMORAL;  Surgeon: Simba Turner MD;  Location: [facility] OR [address] [place] [address];  Service: Vascular;  Laterality: Right;    TRANSESOPHAGEAL ECHOCARDIOGRAPHY N/A 11/3/2022    Procedure: ECHOCARDIOGRAM, TRANSESOPHAGEAL;  Surgeon: Ahmet Bowers MD;  Location: [facility] CATH;  Service: Cardiology;  Laterality: N/A;    TRANSESOPHAGEAL ECHOCARDIOGRAPHY N/A 11/4/2022    Procedure: ECHOCARDIOGRAM, TRANSESOPHAGEAL;  Surgeon: Noel Carpenter MD;  Location: [facility] CATH;  Service: Cardiovascular;  Laterality: N/A;       Social History     Socioeconomic History    Marital status:    Tobacco Use    Smoking status: Never   Substance and Sexual Activity    Alcohol use: No    Drug use: No    Sexual activity: Never       OBJECTIVE:     Vital Signs Range:  Vitals - 1 value per visit 11/7/2022 11/7/2022 11/7/2022   SYSTOLIC 123 108 114   DIASTOLIC 58 57 56   Pulse 90 84 83   Temp 99.1 99.1 99.5   Resp 14 14 14   SPO2 98 98 98   Weight (lb) - - -   Weight (kg) - - -   Height - - -   BMI (Calculated) - - -         CBC:   Lab Results   Component Value Date    WBC 13.55 (H) 11/07/2022    HGB 7.3 (L) 11/07/2022    HCT 22.3 (L) 11/07/2022    MCV 95 11/07/2022     (L) 11/07/2022          CMP:   Sodium   Date Value Ref Range Status   11/07/2022 139 136 - 145 mmol/L Final     Potassium   Date Value Ref Range Status   11/07/2022 3.6 3.5 - 5.1 mmol/L Final     Chloride   Date Value Ref Range Status   11/07/2022 110 95 - 110 mmol/L Final     CO2   Date Value Ref Range Status   11/07/2022 22 (L) 23 - 29 mmol/L Final     Glucose   Date Value Ref Range Status   11/07/2022 147 (H) 70 - 110 mg/dL Final     BUN   Date Value Ref Range Status   11/07/2022 8 8 - 23 mg/dL Final     Creatinine   Date Value Ref Range Status   11/07/2022 0.6 0.5 - 1.4 mg/dL Final     Calcium   Date Value Ref Range Status   11/07/2022 8.1 (L) 8.7 - 10.5 mg/dL Final     Total Protein   Date Value Ref Range Status   11/07/2022 4.9 (L) 6.0 - 8.4 g/dL Final     Albumin   Date Value Ref Range Status   11/07/2022 2.0 (L) 3.5 - 5.2 g/dL Final     Total Bilirubin   Date Value Ref Range Status   11/07/2022 0.8 0.1 - 1.0 mg/dL Final     Comment:     For infants and newborns, interpretation of results should be based  on gestational age, weight and in agreement with clinical  observations.    Premature Infant recommended reference ranges:  Up to 24 hours.............<8.0 mg/dL  Up to 48 hours............<12.0 mg/dL  3-5 days..................<15.0 mg/dL  6-29 days.................<15.0 mg/dL       Alkaline Phosphatase   Date Value Ref Range Status   11/07/2022 353 (H) 55 - 135 U/L Final     AST   Date Value Ref Range Status   11/07/2022 28 10 - 40 U/L Final     ALT   Date Value Ref Range Status   11/07/2022 10 10 - 44 U/L Final     Anion Gap   Date Value Ref Range Status   11/07/2022 7 (L) 8 - 16 mmol/L Final     eGFR if    Date Value Ref Range Status   02/26/2017 >60 >60 mL/min/1.73 m^2 Final     eGFR if non    Date Value Ref Range Status   02/26/2017 >60 >60 mL/min/1.73 m^2 Final     Comment:     Calculation used to obtain the estimated glomerular filtration  rate (eGFR) is the CKD-EPI equation. Since  race is unknown   in our information system, the eGFR values for   -American and Non--American patients are given   for each creatinine result.         INR:  Lab Results   Component Value Date    INR 1.2 11/06/2022    INR 1.3 (H) 11/05/2022    INR 1.3 (H) 11/04/2022       EKG:   Results for orders placed or performed during the hospital encounter of 11/05/22   EKG, 12 - Lead    Collection Time: 11/05/22  6:08 AM    Narrative    Test Reason :     Vent. Rate : 142 BPM     Atrial Rate : 122 BPM     P-R Int : 000 ms          QRS Dur : 080 ms      QT Int : 298 ms       P-R-T Axes : 000 068 175 degrees     QTc Int : 458 ms    Atrial fibrillation with rapid ventricular response  Cannot exclude Lateral infarct ,age undetermined  Low voltage, limb leads  Abnormal ECG  When compared with ECG of 04-NOV-2022 21:29,  Nonspecific T wave abnormality, worse in Inferior leads  Confirmed by Kaleb Mercado MD ([id number]) on 11/5/2022 10:20:36 AM    Referred By: JIM DEVI           Confirmed By:Kaleb Mercado MD        2D ECHO:   Results for orders placed during the hospital encounter of 11/05/22    Echo    Interpretation Summary  · Discussed findings with attending  · In the light of clinical history and CT brain please oder TYSON to r/o clot on amulet device.  · The following segments are hypokinetic: mid inferoseptal, apical septal and apex. Small dyskinetic apical cap with no thrombus seen. All other segments are normal.  · The estimated ejection fraction is 45%.  · The left ventricle is normal in size with concentric remodeling and mildly decreased systolic function.  · Left ventricular diastolic dysfunction.  · Normal right ventricular size with normal right ventricular systolic function.  · Severe left atrial enlargement.  · The estimated PA systolic pressure is 28 mmHg.  · Normal central venous pressure (3 mmHg).         ASSESSMENT/PLAN:         Pre-op Assessment    I have reviewed the Patient Summary Reports.     I  have reviewed the Nursing Notes. I have reviewed the NPO Status.   I have reviewed the Medications.     Review of Systems  Anesthesia Hx:  No problems with previous Anesthesia  Denies Family Hx of Anesthesia complications.   Denies Personal Hx of Anesthesia complications.   Social:  Non-Smoker, No Alcohol Use    Cardiovascular:   Hypertension CAD   ECG has been reviewed. 11/3 TYSON EF 55-65%    10/2022 EKG A-fib Carotoid Artery Disease, bilateral , Left stenosis is 1-39% , Right stenosis is  1-39% Disorder of Cardiac Rhythm, Atrial Fibrillation, Paroxysmal Atrial Fibrillation, S/P electrical cardioversion    Pulmonary:   Sleep Apnea, CPAP    Education provided regarding risk of obstructive sleep apnea     Renal/:   renal calculi    Hepatic/GI:   Denies GERD.    Neurological:   CVA (2/2022 eye stroke)    Endocrine:   Diabetes (borderline), poorly controlled  Obesity / BMI > 30      Physical Exam  General: Sedated, intubated  Femoral CVP  A-line  2 peripheral s  Airway:  Pre-Existing Airway: Oral Endotracheal tube    Dental:top      Anesthesia Plan  Type of Anesthesia, risks & benefits discussed:    Anesthesia Type: Gen ETT  Intra-op Monitoring Plan: Standard ASA Monitors, Art Line and Central Line  Post Op Pain Control Plan: multimodal analgesia and IV/PO Opioids PRN  Induction:  IV  Informed Consent: Informed consent signed with the Patient representative and all parties understand the risks and agree with anesthesia plan.  All questions answered.   ASA Score: 4  Day of Surgery Review of History & Physical: H&P Update referred to the surgeon/provider.    Ready For Surgery From Anesthesia Perspective.     .

## 2022-11-08 PROBLEM — R57.1 HYPOVOLEMIC SHOCK: Status: RESOLVED | Noted: 2022-11-06 | Resolved: 2022-11-08

## 2022-11-08 LAB
ALBUMIN SERPL BCP-MCNC: 1.8 G/DL (ref 3.5–5.2)
ALLENS TEST: ABNORMAL
ALP SERPL-CCNC: 313 U/L (ref 55–135)
ALT SERPL W/O P-5'-P-CCNC: 8 U/L (ref 10–44)
ANION GAP SERPL CALC-SCNC: 9 MMOL/L (ref 8–16)
APTT BLDCRRT: 30.8 SEC (ref 21–32)
AST SERPL-CCNC: 26 U/L (ref 10–40)
BASOPHILS # BLD AUTO: 0.03 K/UL (ref 0–0.2)
BASOPHILS # BLD AUTO: 0.03 K/UL (ref 0–0.2)
BASOPHILS NFR BLD: 0.3 % (ref 0–1.9)
BASOPHILS NFR BLD: 0.3 % (ref 0–1.9)
BILIRUB SERPL-MCNC: 1.1 MG/DL (ref 0.1–1)
BUN SERPL-MCNC: 7 MG/DL (ref 8–23)
CALCIUM SERPL-MCNC: 7.7 MG/DL (ref 8.7–10.5)
CHLORIDE SERPL-SCNC: 110 MMOL/L (ref 95–110)
CO2 SERPL-SCNC: 22 MMOL/L (ref 23–29)
CREAT SERPL-MCNC: 0.6 MG/DL (ref 0.5–1.4)
DELSYS: ABNORMAL
DIFFERENTIAL METHOD: ABNORMAL
DIFFERENTIAL METHOD: ABNORMAL
EOSINOPHIL # BLD AUTO: 0.1 K/UL (ref 0–0.5)
EOSINOPHIL # BLD AUTO: 0.1 K/UL (ref 0–0.5)
EOSINOPHIL NFR BLD: 0.6 % (ref 0–8)
EOSINOPHIL NFR BLD: 0.8 % (ref 0–8)
ERYTHROCYTE [DISTWIDTH] IN BLOOD BY AUTOMATED COUNT: 15.9 % (ref 11.5–14.5)
ERYTHROCYTE [DISTWIDTH] IN BLOOD BY AUTOMATED COUNT: 15.9 % (ref 11.5–14.5)
ERYTHROCYTE [SEDIMENTATION RATE] IN BLOOD BY WESTERGREN METHOD: 16 MM/H
EST. GFR  (NO RACE VARIABLE): >60 ML/MIN/1.73 M^2
FIO2: 40
GLUCOSE SERPL-MCNC: 148 MG/DL (ref 70–110)
GLUCOSE SERPL-MCNC: 82 MG/DL (ref 70–110)
HCO3 UR-SCNC: 13.5 MMOL/L (ref 24–28)
HCO3 UR-SCNC: 26.4 MMOL/L (ref 24–28)
HCT VFR BLD AUTO: 21.8 % (ref 40–54)
HCT VFR BLD AUTO: 23.3 % (ref 40–54)
HCT VFR BLD CALC: <15 %PCV (ref 36–54)
HGB BLD-MCNC: 7.1 G/DL (ref 14–18)
HGB BLD-MCNC: 7.6 G/DL (ref 14–18)
IMM GRANULOCYTES # BLD AUTO: 0.07 K/UL (ref 0–0.04)
IMM GRANULOCYTES # BLD AUTO: 0.08 K/UL (ref 0–0.04)
IMM GRANULOCYTES NFR BLD AUTO: 0.7 % (ref 0–0.5)
IMM GRANULOCYTES NFR BLD AUTO: 0.8 % (ref 0–0.5)
INR PPP: 1.2 (ref 0.8–1.2)
LYMPHOCYTES # BLD AUTO: 1.4 K/UL (ref 1–4.8)
LYMPHOCYTES # BLD AUTO: 1.5 K/UL (ref 1–4.8)
LYMPHOCYTES NFR BLD: 13.7 % (ref 18–48)
LYMPHOCYTES NFR BLD: 15.4 % (ref 18–48)
MAGNESIUM SERPL-MCNC: 1.7 MG/DL (ref 1.6–2.6)
MCH RBC QN AUTO: 30.5 PG (ref 27–31)
MCH RBC QN AUTO: 30.6 PG (ref 27–31)
MCHC RBC AUTO-ENTMCNC: 32.6 G/DL (ref 32–36)
MCHC RBC AUTO-ENTMCNC: 32.6 G/DL (ref 32–36)
MCV RBC AUTO: 94 FL (ref 82–98)
MCV RBC AUTO: 94 FL (ref 82–98)
MODE: ABNORMAL
MONOCYTES # BLD AUTO: 0.9 K/UL (ref 0.3–1)
MONOCYTES # BLD AUTO: 1 K/UL (ref 0.3–1)
MONOCYTES NFR BLD: 9.6 % (ref 4–15)
MONOCYTES NFR BLD: 9.6 % (ref 4–15)
NEUTROPHILS # BLD AUTO: 7.2 K/UL (ref 1.8–7.7)
NEUTROPHILS # BLD AUTO: 7.4 K/UL (ref 1.8–7.7)
NEUTROPHILS NFR BLD: 73.4 % (ref 38–73)
NEUTROPHILS NFR BLD: 74.8 % (ref 38–73)
NRBC BLD-RTO: 0 /100 WBC
NRBC BLD-RTO: 0 /100 WBC
PCO2 BLDA: 19.9 MMHG (ref 35–45)
PCO2 BLDA: 38 MMHG (ref 35–45)
PEEP: 5
PH SMN: 7.44 [PH] (ref 7.35–7.45)
PH SMN: 7.45 [PH] (ref 7.35–7.45)
PHOSPHATE SERPL-MCNC: 1.8 MG/DL (ref 2.7–4.5)
PLATELET # BLD AUTO: 141 K/UL (ref 150–450)
PLATELET # BLD AUTO: 143 K/UL (ref 150–450)
PMV BLD AUTO: 8.9 FL (ref 9.2–12.9)
PMV BLD AUTO: 9.1 FL (ref 9.2–12.9)
PO2 BLDA: 150 MMHG (ref 80–100)
PO2 BLDA: 223 MMHG (ref 80–100)
POC BE: -11 MMOL/L
POC BE: 2 MMOL/L
POC IONIZED CALCIUM: 0.81 MMOL/L (ref 1.06–1.42)
POC SATURATED O2: 100 % (ref 95–100)
POC SATURATED O2: 99 % (ref 95–100)
POC TCO2: 14 MMOL/L (ref 23–27)
POC TCO2: 28 MMOL/L (ref 23–27)
POCT GLUCOSE: 154 MG/DL (ref 70–110)
POCT GLUCOSE: 155 MG/DL (ref 70–110)
POCT GLUCOSE: 155 MG/DL (ref 70–110)
POCT GLUCOSE: 157 MG/DL (ref 70–110)
POCT GLUCOSE: 158 MG/DL (ref 70–110)
POCT GLUCOSE: 158 MG/DL (ref 70–110)
POCT GLUCOSE: 165 MG/DL (ref 70–110)
POCT GLUCOSE: 167 MG/DL (ref 70–110)
POCT GLUCOSE: 171 MG/DL (ref 70–110)
POCT GLUCOSE: 180 MG/DL (ref 70–110)
POCT GLUCOSE: 189 MG/DL (ref 70–110)
POCT GLUCOSE: 189 MG/DL (ref 70–110)
POCT GLUCOSE: 193 MG/DL (ref 70–110)
POCT GLUCOSE: 195 MG/DL (ref 70–110)
POCT GLUCOSE: 208 MG/DL (ref 70–110)
POCT GLUCOSE: 212 MG/DL (ref 70–110)
POCT GLUCOSE: 245 MG/DL (ref 70–110)
POTASSIUM BLD-SCNC: 2.3 MMOL/L (ref 3.5–5.1)
POTASSIUM SERPL-SCNC: 3.3 MMOL/L (ref 3.5–5.1)
PROT SERPL-MCNC: 4.6 G/DL (ref 6–8.4)
PROTHROMBIN TIME: 12 SEC (ref 9–12.5)
RBC # BLD AUTO: 2.32 M/UL (ref 4.6–6.2)
RBC # BLD AUTO: 2.49 M/UL (ref 4.6–6.2)
SAMPLE: ABNORMAL
SAMPLE: ABNORMAL
SITE: ABNORMAL
SODIUM BLD-SCNC: 148 MMOL/L (ref 136–145)
SODIUM SERPL-SCNC: 141 MMOL/L (ref 136–145)
VT: 500
WBC # BLD AUTO: 9.77 K/UL (ref 3.9–12.7)
WBC # BLD AUTO: 9.91 K/UL (ref 3.9–12.7)

## 2022-11-08 PROCEDURE — 94003 VENT MGMT INPAT SUBQ DAY: CPT

## 2022-11-08 PROCEDURE — A4216 STERILE WATER/SALINE, 10 ML: HCPCS | Performed by: PHYSICIAN ASSISTANT

## 2022-11-08 PROCEDURE — 97112 NEUROMUSCULAR REEDUCATION: CPT

## 2022-11-08 PROCEDURE — 99291 CRITICAL CARE FIRST HOUR: CPT | Mod: GC,,, | Performed by: PSYCHIATRY & NEUROLOGY

## 2022-11-08 PROCEDURE — 25000003 PHARM REV CODE 250: Performed by: NURSE PRACTITIONER

## 2022-11-08 PROCEDURE — 85025 COMPLETE CBC W/AUTO DIFF WBC: CPT | Performed by: PHYSICIAN ASSISTANT

## 2022-11-08 PROCEDURE — 99232 SBSQ HOSP IP/OBS MODERATE 35: CPT | Mod: GC,,, | Performed by: NEUROLOGICAL SURGERY

## 2022-11-08 PROCEDURE — 63600175 PHARM REV CODE 636 W HCPCS

## 2022-11-08 PROCEDURE — 36600 WITHDRAWAL OF ARTERIAL BLOOD: CPT

## 2022-11-08 PROCEDURE — 84100 ASSAY OF PHOSPHORUS: CPT | Performed by: PHYSICIAN ASSISTANT

## 2022-11-08 PROCEDURE — 99900035 HC TECH TIME PER 15 MIN (STAT)

## 2022-11-08 PROCEDURE — 25000003 PHARM REV CODE 250: Performed by: PHYSICIAN ASSISTANT

## 2022-11-08 PROCEDURE — 63600175 PHARM REV CODE 636 W HCPCS: Performed by: PHYSICIAN ASSISTANT

## 2022-11-08 PROCEDURE — 99291 PR CRITICAL CARE, E/M 30-74 MINUTES: ICD-10-PCS | Mod: GC,,, | Performed by: PSYCHIATRY & NEUROLOGY

## 2022-11-08 PROCEDURE — 25000003 PHARM REV CODE 250

## 2022-11-08 PROCEDURE — 85025 COMPLETE CBC W/AUTO DIFF WBC: CPT | Mod: 91 | Performed by: PSYCHIATRY & NEUROLOGY

## 2022-11-08 PROCEDURE — 83735 ASSAY OF MAGNESIUM: CPT | Performed by: PHYSICIAN ASSISTANT

## 2022-11-08 PROCEDURE — 97167 OT EVAL HIGH COMPLEX 60 MIN: CPT

## 2022-11-08 PROCEDURE — 82803 BLOOD GASES ANY COMBINATION: CPT

## 2022-11-08 PROCEDURE — 27000221 HC OXYGEN, UP TO 24 HOURS

## 2022-11-08 PROCEDURE — 94761 N-INVAS EAR/PLS OXIMETRY MLT: CPT

## 2022-11-08 PROCEDURE — 85730 THROMBOPLASTIN TIME PARTIAL: CPT | Performed by: PHYSICIAN ASSISTANT

## 2022-11-08 PROCEDURE — 27200966 HC CLOSED SUCTION SYSTEM

## 2022-11-08 PROCEDURE — 85610 PROTHROMBIN TIME: CPT | Performed by: PHYSICIAN ASSISTANT

## 2022-11-08 PROCEDURE — 99232 PR SUBSEQUENT HOSPITAL CARE,LEVL II: ICD-10-PCS | Mod: GC,,, | Performed by: NEUROLOGICAL SURGERY

## 2022-11-08 PROCEDURE — 99900026 HC AIRWAY MAINTENANCE (STAT)

## 2022-11-08 PROCEDURE — 80053 COMPREHEN METABOLIC PANEL: CPT | Performed by: PHYSICIAN ASSISTANT

## 2022-11-08 PROCEDURE — 20000000 HC ICU ROOM

## 2022-11-08 RX ORDER — DEXTROSE MONOHYDRATE 100 MG/ML
INJECTION, SOLUTION INTRAVENOUS CONTINUOUS PRN
Status: DISCONTINUED | OUTPATIENT
Start: 2022-11-08 | End: 2022-11-14

## 2022-11-08 RX ORDER — HYDRALAZINE HYDROCHLORIDE 20 MG/ML
10 INJECTION INTRAMUSCULAR; INTRAVENOUS EVERY 6 HOURS PRN
Status: DISCONTINUED | OUTPATIENT
Start: 2022-11-08 | End: 2022-11-10

## 2022-11-08 RX ORDER — DEXMEDETOMIDINE HYDROCHLORIDE 4 UG/ML
0-1.4 INJECTION, SOLUTION INTRAVENOUS CONTINUOUS
Status: DISCONTINUED | OUTPATIENT
Start: 2022-11-08 | End: 2022-11-08

## 2022-11-08 RX ORDER — DEXMEDETOMIDINE HYDROCHLORIDE 4 UG/ML
0-1.4 INJECTION, SOLUTION INTRAVENOUS CONTINUOUS
Status: DISCONTINUED | OUTPATIENT
Start: 2022-11-08 | End: 2022-11-12

## 2022-11-08 RX ORDER — INSULIN ASPART 100 [IU]/ML
4 INJECTION, SOLUTION INTRAVENOUS; SUBCUTANEOUS
Status: DISCONTINUED | OUTPATIENT
Start: 2022-11-08 | End: 2022-11-08

## 2022-11-08 RX ORDER — LISINOPRIL 20 MG/1
20 TABLET ORAL DAILY
Status: DISCONTINUED | OUTPATIENT
Start: 2022-11-08 | End: 2022-11-10

## 2022-11-08 RX ORDER — LEVETIRACETAM 100 MG/ML
500 SOLUTION ORAL 2 TIMES DAILY
Status: COMPLETED | OUTPATIENT
Start: 2022-11-08 | End: 2022-11-11

## 2022-11-08 RX ORDER — GLUCAGON 1 MG
1 KIT INJECTION
Status: DISCONTINUED | OUTPATIENT
Start: 2022-11-08 | End: 2022-11-14

## 2022-11-08 RX ORDER — INSULIN ASPART 100 [IU]/ML
6 INJECTION, SOLUTION INTRAVENOUS; SUBCUTANEOUS
Status: DISCONTINUED | OUTPATIENT
Start: 2022-11-08 | End: 2022-11-09

## 2022-11-08 RX ORDER — INSULIN ASPART 100 [IU]/ML
1-10 INJECTION, SOLUTION INTRAVENOUS; SUBCUTANEOUS EVERY 4 HOURS PRN
Status: DISCONTINUED | OUTPATIENT
Start: 2022-11-08 | End: 2022-11-14

## 2022-11-08 RX ORDER — BALSAM PERU/CASTOR OIL
OINTMENT (GRAM) TOPICAL 2 TIMES DAILY
Status: DISCONTINUED | OUTPATIENT
Start: 2022-11-08 | End: 2022-11-12

## 2022-11-08 RX ADMIN — INSULIN ASPART 4 UNITS: 100 INJECTION, SOLUTION INTRAVENOUS; SUBCUTANEOUS at 02:11

## 2022-11-08 RX ADMIN — Medication 3 ML: at 05:11

## 2022-11-08 RX ADMIN — INSULIN ASPART 4 UNITS: 100 INJECTION, SOLUTION INTRAVENOUS; SUBCUTANEOUS at 03:11

## 2022-11-08 RX ADMIN — Medication: at 08:11

## 2022-11-08 RX ADMIN — Medication 3 ML: at 02:11

## 2022-11-08 RX ADMIN — INSULIN ASPART 6 UNITS: 100 INJECTION, SOLUTION INTRAVENOUS; SUBCUTANEOUS at 08:11

## 2022-11-08 RX ADMIN — ACETAMINOPHEN 650 MG: 325 TABLET ORAL at 04:11

## 2022-11-08 RX ADMIN — FAMOTIDINE 20 MG: 20 TABLET ORAL at 08:11

## 2022-11-08 RX ADMIN — POTASSIUM BICARBONATE 35 MEQ: 391 TABLET, EFFERVESCENT ORAL at 12:11

## 2022-11-08 RX ADMIN — LABETALOL HYDROCHLORIDE 10 MG: 5 INJECTION, SOLUTION INTRAVENOUS at 02:11

## 2022-11-08 RX ADMIN — MUPIROCIN: 20 OINTMENT TOPICAL at 08:11

## 2022-11-08 RX ADMIN — LEVETIRACETAM 500 MG: 500 SOLUTION ORAL at 08:11

## 2022-11-08 RX ADMIN — DEXMEDETOMIDINE HYDROCHLORIDE 0.1 MCG/KG/HR: 4 INJECTION, SOLUTION INTRAVENOUS at 05:11

## 2022-11-08 RX ADMIN — Medication: at 02:11

## 2022-11-08 RX ADMIN — Medication 3 ML: at 10:11

## 2022-11-08 RX ADMIN — INSULIN ASPART 2 UNITS: 100 INJECTION, SOLUTION INTRAVENOUS; SUBCUTANEOUS at 02:11

## 2022-11-08 RX ADMIN — POLYETHYLENE GLYCOL 3350 17 G: 17 POWDER, FOR SOLUTION ORAL at 08:11

## 2022-11-08 RX ADMIN — LISINOPRIL 20 MG: 20 TABLET ORAL at 09:11

## 2022-11-08 RX ADMIN — INSULIN ASPART 1 UNITS: 100 INJECTION, SOLUTION INTRAVENOUS; SUBCUTANEOUS at 08:11

## 2022-11-08 RX ADMIN — DEXMEDETOMIDINE HYDROCHLORIDE 0.2 MCG/KG/HR: 4 INJECTION INTRAVENOUS at 09:11

## 2022-11-08 RX ADMIN — SENNOSIDES AND DOCUSATE SODIUM 1 TABLET: 50; 8.6 TABLET ORAL at 08:11

## 2022-11-08 RX ADMIN — ACETAMINOPHEN 650 MG: 325 TABLET ORAL at 08:11

## 2022-11-08 RX ADMIN — LEVETIRACETAM 500 MG: 100 INJECTION, SOLUTION INTRAVENOUS at 08:11

## 2022-11-08 RX ADMIN — LABETALOL HYDROCHLORIDE 10 MG: 5 INJECTION, SOLUTION INTRAVENOUS at 08:11

## 2022-11-08 RX ADMIN — POTASSIUM BICARBONATE 35 MEQ: 391 TABLET, EFFERVESCENT ORAL at 09:11

## 2022-11-08 RX ADMIN — FENTANYL CITRATE 50 MCG: 0.05 INJECTION, SOLUTION INTRAMUSCULAR; INTRAVENOUS at 06:11

## 2022-11-08 RX ADMIN — LABETALOL HYDROCHLORIDE 10 MG: 5 INJECTION, SOLUTION INTRAVENOUS at 03:11

## 2022-11-08 RX ADMIN — METOPROLOL TARTRATE 50 MG: 50 TABLET, FILM COATED ORAL at 08:11

## 2022-11-08 RX ADMIN — ATORVASTATIN CALCIUM 40 MG: 40 TABLET, FILM COATED ORAL at 08:11

## 2022-11-08 NOTE — PROGRESS NOTES
[facility] - Neuro Critical Care  Neurocritical Care  Progress Note    Admit Date: 11/5/2022  Service Date: 11/08/2022  Length of Stay: 3    Subjective:     Chief Complaint: Cerebrovascular accident (CVA) due to embolism of left middle cerebral artery    History of Present Illness: Mr. Luis Sorto Jr. is a 76 y.o. male with PMHx of AFib (on Eliquis),CAD, HTN, DM II, s/p LAAO w/Amulet procedure on 11/04/2022 who presents as a transfer from [facility] to Neuro Critical Care s/p [facility] for evaluation of L MCA stroke w/ICH. HPI information gathered from review of the patient's medical record due to the patient being intubated, no family at the bedside.  Patient was LKN around 2000.  He had been discharged home s/p LAAO w/Amulet device on 11/4/2022 around 1000 am.  He acutely developed confusion with difficulty walking and standing, difficulty conversing, and difficulty finding his bathroom.  There were no reported preceding symptoms and nothing was reported to exacerbate or alleviate the symptoms. He presented to [facility] ED where a CTH was obtained and was negative for acute findings.  Telestroke consult was performed by . TNKase was administered as the patient had been off of Eliquis >24h .  Of note, per the ED record patient was noted to have increased pain and swelling in the right groin in the area of the venous access from his Cardiology procedure and then the patient began to have bleeding and bruising in the anterior thigh on he right side. Patient became hemodynamically unstable and was started on Levophed. Outside Hospital Cardiology deparment placed an IV in the left groin for access. Patient was then stabilized. CT Head was ordered and showed Left ICH. Patient was intubated at outside hospital. Cryo was ordered at the outside hospital but not given. Patient was then transferred to [facility] Neuro Critical Care for a higher level of care. On arrival to [facility] the  patient is intubated on fentanyl and levophed, NIH 22, hemodynamically unstable, with swelling to the Right groin, unresponsive, Cryo re-ordered and CT Head STAT. Patient will be admitted to Neuro ICU for a higher level of care.       Hospital Course: 11/06/2022: Patient is now s/p R pseudoaneurysm repair. Patient with slightly worsening ICH overnight, exam unchanged. Some oozing from the R groin site, however improved after holding pressure and applying a sandbag. Will consider FFP if continues to oozy from groin site. Patient off pressors, fluids increased. TYSON pending due to Echo findings and concern for possible thrombus.Tube feedings started.  11/07/2022: TYSON shows no thrombus and improved EF. Weaning sedation as tolerated. Discontinue venous femoral line. Restart tube feedings post TYSON.  11/08/2022: NAEON. Weaning sedation as tolerated. Transitioning from fentanyl gtt to precedex gtt. Oozing from cath site resolved. Plan for pressure support trial this afternoon. Transitioned from insulin gtt to subq insulin.      Interval History:  NAEON. Weaning sedation as tolerated. Transitioning from fentanyl gtt to precedex gtt. Oozing from cath site resolved. Plan for pressure support trial this afternoon. K replaced. Transitioned from insulin gtt to subq insulin.    Review of Systems   Unable to perform ROS: Intubated     Objective:     Vitals:  Temp: (!) 100.8 °F (38.2 °C)  Pulse: 96  Rhythm: atrial rhythm  BP: (!) 194/86 (post suction- labetalol given x 2)  MAP (mmHg): 123  Resp: 16  SpO2: 98 %  Oxygen Concentration (%): 30  O2 Device (Oxygen Therapy): ventilator  Vent Mode: (S) Spont  Set Rate: 16 BPM  Vt Set: 500 mL  Pressure Support: (S) 10 cmH20  PEEP/CPAP: 5 cmH20  Peak Airway Pressure: 15 cmH2O  Mean Airway Pressure: 9.1 cmH20  Plateau Pressure: 0 cmH20    Temp  Min: 99.5 °F (37.5 °C)  Max: 100.9 °F (38.3 °C)  Pulse  Min: 83  Max: 124  BP  Min: 106/53  Max: 194/86  MAP (mmHg)  Min: 75  Max: 123  Resp  Min: 14   Max: 22  SpO2  Min: 97 %  Max: 99 %  Oxygen Concentration (%)  Min: 30  Max: 40    11/07 0701 - 11/08 0700  In: 1993.6 [I.V.:1342]  Out: 1655 [Urine:1560; Drains:95]           Physical Exam  Vitals and nursing note reviewed.   Constitutional:       General: He is not in acute distress.     Appearance: Normal appearance. He is obese.      Interventions: He is sedated and intubated.      Comments: Well developed. Resting comfortably in bed.   HENT:      Head: Normocephalic and atraumatic.      Right Ear: External ear normal.      Left Ear: External ear normal.      Nose: Nose normal.      Mouth/Throat:      Mouth: Mucous membranes are moist.      Comments: ETT & OGT in place.   Eyes:      General: No scleral icterus.     Pupils: Pupils are equal, round, and reactive to light.   Cardiovascular:      Rate and Rhythm: Normal rate and regular rhythm.   Pulmonary:      Effort: Pulmonary effort is normal. No respiratory distress. He is intubated.      Comments: Intubated & mechanically ventilated.   Vent Mode: Spont  Oxygen Concentration (%):  [30-40] 30  Resp Rate Total:  [16 br/min-29 br/min] 16 br/min  Vt Set:  [500 mL] 500 mL  PEEP/CPAP:  [5 cmH20] 5 cmH20  Pressure Support:  [5 cmH20-10 cmH20] 10 cmH20  Mean Airway Pressure:  [7.2 vfC27-06 cmH20] 9.1 cmH20   Abdominal:      General: Abdomen is flat. There is no distension.      Palpations: Abdomen is soft.   Musculoskeletal:      Right lower leg: No edema.      Left lower leg: No edema.      Comments: R groin hematoma with bruising throughout extending into scrotum. Wound vac in place.   Skin:     General: Skin is warm and dry.   Neurological:      Comments: [id number]  Chemically sedated. Opens eyes to noxious stimuli. Does not track. Does not follow commands.   PERRL. No facial asymmetry. Cough, gag, & corneal reflexes intact.   RUE flexor posturing. RLE no movement. LUE moves spontaneously. LLE withdraws to noxious stimuli. Sensation intact to noxious stimuli.       Unable to test orientation, language, memory, judgment, insight, fund of knowledge, hearing, shoulder shrug, tongue protrusion, coordination, gait due to level of consciousness.    Medications:  Continuousdexmedetomidine (PRECEDEX) infusion, Last Rate: 0.2 mcg/kg/hr (11/08/22 0925)  dextrose 10 % in water (D10W)  fentanyl, Last Rate: 50 mcg/hr (11/08/22 0900)    Scheduledatorvastatin, 40 mg, Daily  balsam peru-castor oiL, , BID  famotidine, 20 mg, BID  insulin aspart U-100, 4 Units, 6 times per day  levetiracetam, 500 mg, BID  lisinopriL, 20 mg, Daily  metoprolol tartrate, 50 mg, BID  mupirocin, , BID  polyethylene glycol, 17 g, Daily  senna-docusate 8.6-50 mg, 1 tablet, BID  sodium chloride 0.9%, 3 mL, Q8H    PRNacetaminophen, 650 mg, Q6H PRN  dextrose 10 % in water (D10W), , Continuous PRN  dextrose 10%, 12.5 g, PRN  dextrose 10%, 25 g, PRN  fentaNYL, 50 mcg, Q1H PRN  glucagon (human recombinant), 1 mg, PRN  hydrALAZINE, 10 mg, Q6H PRN  insulin aspart U-100, 1-10 Units, Q4H PRN  labetalol, 10 mg, PRN  magnesium oxide, 800 mg, PRN  magnesium oxide, 800 mg, PRN  metoprolol, 5 mg, Q5 Min PRN  ondansetron, 4 mg, Q6H PRN  potassium bicarbonate, 35 mEq, PRN  potassium bicarbonate, 50 mEq, PRN  potassium bicarbonate, 60 mEq, PRN  potassium, sodium phosphates, 2 packet, PRN  potassium, sodium phosphates, 2 packet, PRN  potassium, sodium phosphates, 2 packet, PRN      Today I personally reviewed pertinent medications, lines/drains/airways, imaging, cardiology results, laboratory results, notably:    Laboratory:  CBC:  Recent Labs   Lab 11/08/22  0358   WBC 9.91   RBC 2.49*   HGB 7.6*   HCT 23.3*   *   MCV 94   MCH 30.5   MCHC 32.6       CMP:  Recent Labs   Lab 11/08/22  0215   CALCIUM 7.7*   PROT 4.6*      K 3.3*   CO2 22*      BUN 7*   CREATININE 0.6   ALKPHOS 313*   ALT 8*   AST 26   BILITOT 1.1*       Coagulation:  Recent Labs   Lab 11/08/22  0215   LABPROT 12.0   INR 1.2   APTT 30.8        ABG:  Recent Labs     11/08/22  0353   PH 7.450   PCO2 38.0   PO2 150*   HCO3 26.4   POCSATURATED 99   BE 2       Imaging:  X-Ray: X-Ray Chest 1 View  Result Date: 11/8/2022  No significant change from prior study. Electronically signed by: Juan Antonio Ko MD Date:    11/08/2022 Time:    04:51       Diet  Diet NPO  Diet NPO    Assessment/Plan:     Neuro  * Cerebrovascular accident (CVA) due to embolism of left middle cerebral artery  s/p Tenecteplase with new Left ICH   Holding ASA and subq heparin due to ICH    Left-sided nontraumatic intracerebral hemorrhage  Possible stroke s/p Tenecteplase with new Left ICH   -- AFib (on Eliquis)  -- s/p LAAO w/Amulet procedure on 11/04/2022  s/p TNKase  -- Continue Neuro checks q 1hr  -- Vascular Neurology consulted  -- Neurosurgery consulted  -- SBP goal <140 (Now with new Left ICH)  -- PT/OT/Speech  -- Atorvastatin 40 mg  -- Slight increase in size of bleed overnight 11/5, no change in exam  -- No surgical intervention at this time per NSGY team     Pulmonary  On mechanically assisted ventilation  Intubated at the outside hospital for airway protection  Weaning settings as tolerated, currently on minimal vent settings  Daily ABG, CXR  Continue famotidine, peridex  SBT daily    Vent Mode: Spont  Oxygen Concentration (%):  [30-40] 30  Resp Rate Total:  [16 br/min-29 br/min] 17 br/min  Vt Set:  [500 mL] 500 mL  PEEP/CPAP:  [5 cmH20] 5 cmH20  Pressure Support:  [5 cmH20-10 cmH20] 10 cmH20  Mean Airway Pressure:  [7.2 byY10-51 cmH20] 9.1 cmH20    Cardiac/Vascular  Acute systolic heart failure  Echo shows:  The following segments are hypokinetic: mid inferoseptal, apical septal and apex. Small dyskinetic apical cap with no thrombus seen. All other segments are normal.  The estimated ejection fraction is 45%.  The left ventricle is normal in size with concentric remodeling and mildly decreased systolic function.  Left ventricular diastolic dysfunction.  Normal right  ventricular size with normal right ventricular systolic function.  Severe left atrial enlargement.  The estimated PA systolic pressure is 28 mmHg.  Normal central venous pressure (3 mmHg).    TYSON 11/7 shows no thrombus, improved EF of 55%    Pseudoaneurysm of right femoral artery  S/p repair 11/5  Vascular surgery following, appreciate recs  Oozing resolved, will continue to monitor    Hyperlipidemia  -- Lipid panel  -- Atorvastatin 40 mg daily    Persistent atrial fibrillation  -- AFib (on Eliquis), stopped prior to procedure LAAO w/Amulet procedure on 11/04/2022   -- 5mg IV Lopressor upon admission  -- Home metoprolol started    Renal/  Lactic acidosis  Improving  Continue IVF    Endocrine  Diabetes mellitus  Diabetes Mellitus Type II  -- Hyperglycemic in the 300s on admit  -- Continue insulin gtt, will transition to subq insulin once on tube feedings for 24 hrs  -- HgbA1c 6.1    GI  Oral phase dysphagia  Intubated  OGT in place for nutrition and meds  Cont tube feedings    Groin swelling  -- s/p LAAO w/Amulet procedure on 11/04/2022  s/p TNKase  -- Groin swelling continuous to increase in size  -- Sand bags and manual pressure applied   -- Vascular surgery consulted  -- See pseudoaneurysm          The patient is being Prophylaxed for:  Venous Thromboembolism with: Mechanical, hold chemical in setting of acute bleed  Stress Ulcer with: H2B  Ventilator Pneumonia with: chlorhexidine oral care    Activity Orders            Turn patient starting at 11/05 1258    Elevate HOB starting at 11/05 0623    Diet NPO: NPO starting at 11/05 0623          Full Code     Critical condition in that Patient has a condition that poses threat to life and bodily function: L MCA CVA s/p tenecteplase, ICH, vasogenic edema, brain compression,  R femoral pseudoaneurysm, intubated & mechanically ventilated, debility.     32 minutes of critical care time was spent personally by me on the following activities: development of treatment plan  with patient or surrogate and bedside caregivers, discussions with consultants, evaluation of patient's response to treatment, examination of patient, ordering and performing treatments and interventions, ordering and review of laboratory studies, ordering and review of radiographic studies, pulse oximetry, re-evaluation of patient's condition. This critical care time did not overlap with that of any other provider or involve time for any procedures. There is high probability for acute neurological change leading to clinical and possibly life-threatening deterioration requiring highest level of physician preparedness for urgent intervention.    Lizbet Evans PA-C  Neurocritical Care  [facility] - Neuro Critical Care

## 2022-11-08 NOTE — SUBJECTIVE & OBJECTIVE
Interval History:  NAEON. Weaning sedation as tolerated. Transitioning from fentanyl gtt to precedex gtt. Oozing from cath site resolved. Plan for pressure support trial this afternoon. K replaced.     Review of Systems   Unable to perform ROS: Intubated     Objective:     Vitals:  Temp: (!) 100.8 °F (38.2 °C)  Pulse: 96  Rhythm: atrial rhythm  BP: (!) 194/86 (post suction- labetalol given x 2)  MAP (mmHg): 123  Resp: 16  SpO2: 98 %  Oxygen Concentration (%): 30  O2 Device (Oxygen Therapy): ventilator  Vent Mode: (S) Spont  Set Rate: 16 BPM  Vt Set: 500 mL  Pressure Support: (S) 10 cmH20  PEEP/CPAP: 5 cmH20  Peak Airway Pressure: 15 cmH2O  Mean Airway Pressure: 9.1 cmH20  Plateau Pressure: 0 cmH20    Temp  Min: 99.5 °F (37.5 °C)  Max: 100.9 °F (38.3 °C)  Pulse  Min: 83  Max: 124  BP  Min: 106/53  Max: 194/86  MAP (mmHg)  Min: 75  Max: 123  Resp  Min: 14  Max: 22  SpO2  Min: 97 %  Max: 99 %  Oxygen Concentration (%)  Min: 30  Max: 40    11/07 0701 - 11/08 0700  In: 1993.6 [I.V.:1342]  Out: 1655 [Urine:1560; Drains:95]           Physical Exam  Vitals and nursing note reviewed.   Constitutional:       General: He is not in acute distress.     Appearance: Normal appearance. He is obese.      Interventions: He is sedated and intubated.      Comments: Well developed. Resting comfortably in bed.   HENT:      Head: Normocephalic and atraumatic.      Right Ear: External ear normal.      Left Ear: External ear normal.      Nose: Nose normal.      Mouth/Throat:      Mouth: Mucous membranes are moist.      Comments: ETT & OGT in place.   Eyes:      General: No scleral icterus.     Pupils: Pupils are equal, round, and reactive to light.   Cardiovascular:      Rate and Rhythm: Normal rate and regular rhythm.   Pulmonary:      Effort: Pulmonary effort is normal. No respiratory distress. He is intubated.      Comments: Intubated & mechanically ventilated.   Vent Mode: Spont  Oxygen Concentration (%):  [30-40] 30  Resp Rate Total:   [16 br/min-29 br/min] 16 br/min  Vt Set:  [500 mL] 500 mL  PEEP/CPAP:  [5 cmH20] 5 cmH20  Pressure Support:  [5 cmH20-10 cmH20] 10 cmH20  Mean Airway Pressure:  [7.2 mcC84-21 cmH20] 9.1 cmH20   Abdominal:      General: Abdomen is flat. There is no distension.      Palpations: Abdomen is soft.   Musculoskeletal:      Right lower leg: No edema.      Left lower leg: No edema.      Comments: R groin hematoma with bruising throughout extending into scrotum. Wound vac in place.   Skin:     General: Skin is warm and dry.   Neurological:      Comments: [id number]  Chemically sedated. Opens eyes to noxious stimuli. Does not track. Does not follow commands.   PERRL. No facial asymmetry. Cough, gag, & corneal reflexes intact.   RUE flexor posturing. RLE no movement. LUE moves spontaneously. LLE withdraws to noxious stimuli. Sensation intact to noxious stimuli.      Unable to test orientation, language, memory, judgment, insight, fund of knowledge, hearing, shoulder shrug, tongue protrusion, coordination, gait due to level of consciousness.    Medications:  Continuousdexmedetomidine (PRECEDEX) infusion, Last Rate: 0.2 mcg/kg/hr (11/08/22 0925)  dextrose 10 % in water (D10W)  fentanyl, Last Rate: 50 mcg/hr (11/08/22 0900)    Scheduledatorvastatin, 40 mg, Daily  balsam peru-castor oiL, , BID  famotidine, 20 mg, BID  insulin aspart U-100, 4 Units, 6 times per day  levetiracetam, 500 mg, BID  lisinopriL, 20 mg, Daily  metoprolol tartrate, 50 mg, BID  mupirocin, , BID  polyethylene glycol, 17 g, Daily  senna-docusate 8.6-50 mg, 1 tablet, BID  sodium chloride 0.9%, 3 mL, Q8H    PRNacetaminophen, 650 mg, Q6H PRN  dextrose 10 % in water (D10W), , Continuous PRN  dextrose 10%, 12.5 g, PRN  dextrose 10%, 25 g, PRN  fentaNYL, 50 mcg, Q1H PRN  glucagon (human recombinant), 1 mg, PRN  hydrALAZINE, 10 mg, Q6H PRN  insulin aspart U-100, 1-10 Units, Q4H PRN  labetalol, 10 mg, PRN  magnesium oxide, 800 mg, PRN  magnesium oxide, 800 mg,  PRN  metoprolol, 5 mg, Q5 Min PRN  ondansetron, 4 mg, Q6H PRN  potassium bicarbonate, 35 mEq, PRN  potassium bicarbonate, 50 mEq, PRN  potassium bicarbonate, 60 mEq, PRN  potassium, sodium phosphates, 2 packet, PRN  potassium, sodium phosphates, 2 packet, PRN  potassium, sodium phosphates, 2 packet, PRN      Today I personally reviewed pertinent medications, lines/drains/airways, imaging, cardiology results, laboratory results, notably:    Laboratory:  CBC:  Recent Labs   Lab 11/08/22 0358   WBC 9.91   RBC 2.49*   HGB 7.6*   HCT 23.3*   *   MCV 94   MCH 30.5   MCHC 32.6       CMP:  Recent Labs   Lab 11/08/22 0215   CALCIUM 7.7*   PROT 4.6*      K 3.3*   CO2 22*      BUN 7*   CREATININE 0.6   ALKPHOS 313*   ALT 8*   AST 26   BILITOT 1.1*       Coagulation:  Recent Labs   Lab 11/08/22 0215   LABPROT 12.0   INR 1.2   APTT 30.8       ABG:  Recent Labs     11/08/22 0353   PH 7.450   PCO2 38.0   PO2 150*   HCO3 26.4   POCSATURATED 99   BE 2       Imaging:  X-Ray: X-Ray Chest 1 View  Result Date: 11/8/2022  No significant change from prior study. Electronically signed by: Juan Antonio Ko MD Date:    11/08/2022 Time:    04:51       Diet  Diet NPO  Diet NPO

## 2022-11-08 NOTE — PLAN OF CARE
Problem: Nutrition Impairment (Mechanical Ventilation, Invasive)  Goal: Optimal Nutrition Delivery  Outcome: Ongoing, Progressing   [facility] Care Plan    POC reviewed with Luis Sorto Jr. and family at 0300. Pt unable to verbalize understanding. Questions and concerns addressed. No acute events overnight. Pt progressing toward goals. Will continue to monitor. See below and flowsheets for full assessment and VS info.           Is this a stroke patient? yes- Stroke booklet reviewed with patient and family, risk factors identified for patient and stroke booklet remains at bedside for ongoing education.     Neuro:  Dunia Coma Scale  Best Eye Response: 2-->(E2) to pain  Best Motor Response: 4-->(M4) withdraws from pain  Best Verbal Response: 1-->(V1) none  Duina Coma Scale Score: 7  Assessment Qualifiers: patient intubated  Pupil PERRLA: yes     24hr Temp:  [99.1 °F (37.3 °C)-100.4 °F (38 °C)]     CV:   Rhythm: atrial rhythm  BP goals:   SBP < 140  MAP > 65    Resp:   O2 Device (Oxygen Therapy): ventilator  Vent Mode: A/C  Set Rate: 16 BPM  Oxygen Concentration (%): 30  Vt Set: 500 mL  PEEP/CPAP: 5 cmH20  Pressure Support: 5 cmH20    Plan: N/A    GI/:     Diet/Nutrition Received: NPO  Last Bowel Movement: 11/04/22  Voiding Characteristics: urethral catheter (bladder)    Intake/Output Summary (Last 24 hours) at 11/8/2022 0630  Last data filed at 11/8/2022 0601  Gross per 24 hour   Intake 1282.33 ml   Output 1545 ml   Net -262.67 ml          Labs/Accuchecks:  Recent Labs   Lab 11/08/22  0358   WBC 9.91   RBC 2.49*   HGB 7.6*   HCT 23.3*   *      Recent Labs   Lab 11/08/22  0215      K 3.3*   CO2 22*      BUN 7*   CREATININE 0.6   ALKPHOS 313*   ALT 8*   AST 26   BILITOT 1.1*      Recent Labs   Lab 11/08/22  0215   INR 1.2   APTT 30.8      Recent Labs   Lab 11/05/22  0632 11/05/22  1201 11/06/22  0003     --   --    CPKMB 2.4  --   --    TROPONINI 0.141*   < > 0.053*   MB 2.1  --   --     <  > = values in this interval not displayed.       Electrolytes: N/A - electrolytes WDL  Accuchecks: Q1H    Gtts:   sodium chloride 0.9% 50 mL/hr at 11/07/22 1800    clevidipine Stopped (11/07/22 1005)    fentanyl 75 mcg/hr (11/07/22 2020)    insulin regular 1 units/mL infusion orderable (DKA) 0.2 Units/hr (11/07/22 1800)       LDA/Wounds:  Lines/Drains/Airways       Drain  Duration                  NG/OG Tube 11/05/22 0333 orogastric 18 Fr. Center mouth 3 days         Urethral Catheter 11/05/22 0630 Non-latex 16 Fr. 3 days         Closed/Suction Drain 11/05/22 1741 Right;Anterior Groin Bulb 15 Fr. 2 days         Closed/Suction Drain 11/05/22 1742 Right;Anterior Hip Bulb 15 Fr. 2 days              Airway  Duration                  Airway - Non-Surgical 11/05/22 0328 3 days              Peripheral Intravenous Line  Duration                  Peripheral IV - Single Lumen 11/05/22 18 G Anterior;Proximal;Right Forearm 3 days         Peripheral IV - Single Lumen 11/05/22 20 G Anterior;Right Upper Arm 3 days                  Wounds: Yes  Wound care consulted: Yes

## 2022-11-08 NOTE — PLAN OF CARE
[facility] Care Plan    POC reviewed with Luis Sorto Jr. and family at 1400. Pt verbalized understanding. Questions and concerns addressed. No acute events today. Pt progressing toward goals. Will continue to monitor. See below and flowsheets for full assessment and VS info.       Fentanyl gtt D/C and precedex started. Currently at 0.1  Potassium replaced  Insulin gtt D/C and stand insulin ordered q4  Pt switched to spontaneous on vent  Tube feeds at goal- formula changed to glucerna 1.5 not available in nutrition room - nutrition notified   PO blood pressure medications added    Is this a stroke patient? yes- Stroke booklet reviewed with family, risk factors identified for patient and stroke booklet remains at bedside for ongoing education.     Neuro:  Dunia Coma Scale  Best Eye Response: 2-->(E2) to pain  Best Motor Response: 4-->(M4) withdraws from pain  Best Verbal Response: 1-->(V1) none  Dunia Coma Scale Score: 7  Assessment Qualifiers: patient intubated  Pupil PERRLA: yes     24 hr Temp:  [99.5 °F (37.5 °C)-100.9 °F (38.3 °C)]     CV:   Rhythm: atrial rhythm  BP goals:   SBP < 140  MAP > 65    Resp:   O2 Device (Oxygen Therapy): ventilator  Vent Mode: (S) Spont  Set Rate: 16 BPM  Oxygen Concentration (%): 30  Vt Set: 500 mL  PEEP/CPAP: 5 cmH20  Pressure Support: (S) 10 cmH20    Plan: N/A and wean to extubate    GI/:     Diet/Nutrition Received: tube feeding  Last Bowel Movement: 11/04/22  Voiding Characteristics: urethral catheter (bladder)    Intake/Output Summary (Last 24 hours) at 11/8/2022 1720  Last data filed at 11/8/2022 1700  Gross per 24 hour   Intake 1807.51 ml   Output 1810 ml   Net -2.49 ml          Labs/Accuchecks:  Recent Labs   Lab 11/08/22 0358   WBC 9.91   RBC 2.49*   HGB 7.6*   HCT 23.3*   *      Recent Labs   Lab 11/08/22 0215      K 3.3*   CO2 22*      BUN 7*   CREATININE 0.6   ALKPHOS 313*   ALT 8*   AST 26   BILITOT 1.1*      Recent Labs   Lab 11/08/22 0215    INR 1.2   APTT 30.8      Recent Labs   Lab 11/05/22  0632 11/05/22  1201 11/06/22  0003     --   --    CPKMB 2.4  --   --    TROPONINI 0.141*   < > 0.053*   MB 2.1  --   --     < > = values in this interval not displayed.       Electrolytes: Electrolytes replaced  Accuchecks: Q4H    Gtts:   dexmedetomidine (PRECEDEX) infusion      dextrose 10 % in water (D10W)         LDA/Wounds:  Lines/Drains/Airways       Drain  Duration                  Closed/Suction Drain 11/05/22 1741 Right;Anterior Groin Bulb 15 Fr. 3 days         Closed/Suction Drain 11/05/22 1742 Right;Anterior Hip Bulb 15 Fr. 3 days         NG/OG Tube 11/05/22 0333 orogastric 18 Fr. Center mouth 3 days         Urethral Catheter 11/05/22 0630 Non-latex 16 Fr. 3 days              Airway  Duration                  Airway - Non-Surgical 11/05/22 0328 3 days              Peripheral Intravenous Line  Duration                  Peripheral IV - Single Lumen 11/05/22 18 G Anterior;Proximal;Right Forearm 3 days         Peripheral IV - Single Lumen 11/05/22 20 G Anterior;Right Upper Arm 3 days                  Wounds: Yes  Wound care consulted: Yes

## 2022-11-08 NOTE — PLAN OF CARE
OT evaluation initiated.  Problem: Occupational Therapy  Goal: Occupational Therapy Goal  Description: Goals set 11/8 to be addressed for 14 days with expiration date, 11/22:  Patient will increase functional independence with ADLs by performing:    Patient will demonstrate rolling to the right with max assist.  Not met   Patient will demonstrate rolling to the left with max assist.   Not met  Patient will demonstrate supine -sit with max  assist.   Not met  Patient will demonstrate stand pivot transfers with max assist.   Not met  Patient will demonstrate grooming while seated with max assist.   Not met  Patient will demonstrate upper body dressing with max assist while seated EOB.   Not met  Patient will demonstrate lower body dressing with max assist while seated EOB.   Not met  Patient will demonstrate toileting with max assist.   Not met  Patient's family / caregiver will demonstrate independence and safety with assisting patient with self-care skills and functional mobility.     Not met  Patient's family / caregiver will demonstrate independence with providing ROM and changes in bed positioning.   Not met  Patient and/or patient's family will verbalize understanding of stroke prevention guidelines, personal risk factors and stroke warning signs via teachback method.  Not met             Outcome: Ongoing, Progressing

## 2022-11-08 NOTE — ASSESSMENT & PLAN NOTE
Intubated at the outside hospital for airway protection  Weaning settings as tolerated, currently on minimal vent settings  Daily ABG, CXR  Continue famotidine, peridex  SBT daily    Vent Mode: Spont  Oxygen Concentration (%):  [30-40] 30  Resp Rate Total:  [16 br/min-29 br/min] 17 br/min  Vt Set:  [500 mL] 500 mL  PEEP/CPAP:  [5 cmH20] 5 cmH20  Pressure Support:  [5 cmH20-10 cmH20] 10 cmH20  Mean Airway Pressure:  [7.2 nsP83-73 cmH20] 9.1 cmH20

## 2022-11-08 NOTE — CONSULTS
[facility] - Neuro Critical Care  Wound Care    Patient Name:  Luis Sorto Jr.   MRN:  [id number]  Date: 11/8/2022  Diagnosis: Cerebrovascular accident (CVA) due to embolism of left middle cerebral artery    History:     Past Medical History:   Diagnosis Date    A-fib     Anticoagulant long-term use     Coronary artery disease     Diabetes mellitus     no meds    Digestive disorder     Hypertension     Paroxysmal atrial fibrillation     Renal disorder     kidney stone    Sleep apnea     c pap machine used    Stroke     tia    Unspecified glaucoma        Social History     Socioeconomic History    Marital status:    Tobacco Use    Smoking status: Never   Substance and Sexual Activity    Alcohol use: No    Drug use: No    Sexual activity: Never       Precautions:     Allergies as of 11/05/2022    (No Known Allergies)       [facility] Assessment Details/Treatment   Wound care consult received for the sacrum and right groin.  The sacrum is clean, dry, intact and blanchable. The thoracic spine is intact with purple discoloration. The right groin has a wound vac, which vascular surgery is following.    Recommendations:  - Thoracic spine: nursing to cleanse with NS, pat dry and apply BPCO (balsam peru and castor oil) to the purple discoloration every shift  - Turning every 2 hours  - Heel protecting boots      11/08/22 1006        Altered Skin Integrity 11/08/22 1006 Thoracic spine Purple or maroon localized area of discolored intact skin or non-intact skin or a blood-filled blister.   Date First Assessed/Time First Assessed: 11/08/22 1006   Altered Skin Integrity Present on Admission: yes  Location: Thoracic spine  Is this injury device related?: No  Description of Altered Skin Integrity: Purple or maroon localized area of discolor...   Wound Image      Description of Altered Skin Integrity Purple or maroon localized area of discolored intact skin or non-intact skin or a blood-filled blister.   Dressing Appearance Open to  air   Drainage Amount None   Appearance Purple;Intact;Dry   Periwound Area Intact;Dry       Recommendations made to primary team for above plan via secure chat. Orders placed. Wound care to follow-up as needed.     11/08/2022

## 2022-11-08 NOTE — PT/OT/SLP EVAL
"Occupational Therapy   Evaluation    Name: Luis Sorto Jr.  MRN: [id number]  Admitting Diagnosis:  Cerebrovascular accident (CVA) due to embolism of left middle cerebral artery  Recent Surgery: Procedure(s) (LRB):  ECHOCARDIOGRAM,TRANSESOPHAGEAL (N/A) 1 Day Post-Op    Recommendations:     Discharge Recommendations:  (pending extubation)  Discharge Equipment Recommendations:   (pending extubation)  Barriers to discharge:  Other (Comment)    Assessment:     Luis Sorto Jr. is a 76 y.o. male with a medical diagnosis of Cerebrovascular accident (CVA) due to embolism of left middle cerebral artery.  He presents with performance deficits affecting function: weakness, abnormal tone, decreased ROM, impaired cognition, impaired endurance, impaired sensation, decreased coordination, impaired coordination, decreased upper extremity function, impaired self care skills, impaired functional mobility, decreased safety awareness, gait instability, impaired balance, impaired cardiopulmonary response to activity, decreased lower extremity function.      Rehab Prognosis: Good; patient would benefit from acute skilled OT services to address these deficits and reach maximum level of function.       Plan:     Patient to be seen 3 x/week to address the above listed problems via self-care/home management, therapeutic activities, therapeutic exercises, neuromuscular re-education, cognitive retraining, sensory integration  Plan of Care Expires: 12/04/22  Plan of Care Reviewed with: patient    Subjective     Patient:  Nonverbal; intubated    Occupational Profile:  Patient resides in [place] with wife in one story home with 36" doorways, walk in shower and walk in tub; ramp access with bilateral rails.  Patient is right handed.  Retired: law enforcement.  Hobbies: carpentry, woodwork, shooting.  PTA patient independent with ADLs, has not been driving x 1 year 2* dizziness.  DME:  rollator (borrowed from mother in law); recently purchased " wheelchair, hospital bed.     Pain/Comfort:  Pain Rating 1: 0/10  Pain Rating Post-Intervention 1: 0/10    Patients cultural, spiritual, Sabianism conflicts given the current situation: no    Objective:     Communicated with: Nurse prior to session.  Patient found supine with bed alarm, ventilator, telemetry, pressure relief boots, blood pressure cuff, pulse ox (continuous), peripheral IV, NEHA drain, restraints, bhardwaj catheter, wound vac upon OT entry to room.    General Precautions: Standard, aspiration, fall, NPO   Orthopedic Precautions:N/A   Braces: N/A  Respiratory Status: Room air    Occupational Performance:    Bed Mobility:    Patient completed Rolling/Turning to Left with  total assistance  Patient completed Rolling/Turning to Right with total assistance    Functional Mobility/Transfers:  Dependent drawsheet transfers    Activities of Daily Living:  Feeding:  NPO    Grooming: total assistance while supine    Cognitive/Visual Perceptual:  Cognitive/Psychosocial Skills:     -       Oriented to: Daily orientation provided   -       Follows Commands/attention:not following commands   -       Communication: nonverbal    Physical Exam:  Postural examination/scapula alignment:    -       Rounded shoulders  Upper Extremity Range of Motion:  PROM WNLbilaterally    AMPAC 6 Click ADL:  AMPAC Total Score: 6    Treatment & Education:  Patient/ Family education provided for stroke warning signs, prevention guidelines and personal risk factors.  Patient/ Family verbalizing understanding via teach back method.  Patient education provided on role of OT.  Continued education, patient/ family training recommended.    Daily orientation provided.  PROM performed bilateral UE/LEs one set x 10 rep in all planes of motion with stretches provided at end range; sustained stretch provided for ankle dorsiflexion.  Assistance and facilitation provided for upward rotation of the scapula during shoulder flexion and abduction to promote  orientation of glenoid fossa of scapula to humeral head for prevention of post-stroke hemiplegic pain.  Positioning provided for midline orientation with bilateral UEs elevated and heels lifted off mattress.    Patient left supine with all lines intact, call button in reach, and bed alarm on    GOALS:   Multidisciplinary Problems       Occupational Therapy Goals          Problem: Occupational Therapy    Goal Priority Disciplines Outcome Interventions   Occupational Therapy Goal     OT, PT/OT Ongoing, Progressing    Description: Goals set 11/8 to be addressed for 14 days with expiration date, 11/22:  Patient will increase functional independence with ADLs by performing:    Patient will demonstrate rolling to the right with max assist.  Not met   Patient will demonstrate rolling to the left with max assist.   Not met  Patient will demonstrate supine -sit with max  assist.   Not met  Patient will demonstrate stand pivot transfers with max assist.   Not met  Patient will demonstrate grooming while seated with max assist.   Not met  Patient will demonstrate upper body dressing with max assist while seated EOB.   Not met  Patient will demonstrate lower body dressing with max assist while seated EOB.   Not met  Patient will demonstrate toileting with max assist.   Not met  Patient's family / caregiver will demonstrate independence and safety with assisting patient with self-care skills and functional mobility.     Not met  Patient's family / caregiver will demonstrate independence with providing ROM and changes in bed positioning.   Not met  Patient and/or patient's family will verbalize understanding of stroke prevention guidelines, personal risk factors and stroke warning signs via teachback method.  Not met                                  History:     Past Medical History:   Diagnosis Date    A-fib     Anticoagulant long-term use     Coronary artery disease     Diabetes mellitus     no meds    Digestive disorder      Hypertension     Paroxysmal atrial fibrillation     Renal disorder     kidney stone    Sleep apnea     c pap machine used    Stroke     tia    Unspecified glaucoma          Past Surgical History:   Procedure Laterality Date    CARDIOVERSION      CYSTOSCOPY      EXCLUSION OF LEFT ATRIAL APPENDAGE N/A 11/4/2022    Procedure: EXCLUSION, LEFT ATRIAL APPENDAGE;  Surgeon: Dallas Gold MD;  Location: [facility] CATH;  Service: Cardiology;  Laterality: N/A;    EYE SURGERY      JOINT REPLACEMENT Bilateral     REPAIR, PSEUDOANEURYSM, ARTERY, FEMORAL Right 11/5/2022    Procedure: REPAIR, PSEUDOANEURYSM, ARTERY, FEMORAL;  Surgeon: Simba Turner MD;  Location: [facility] OR [address] [place] [address];  Service: Vascular;  Laterality: Right;    TRANSESOPHAGEAL ECHOCARDIOGRAPHY N/A 11/3/2022    Procedure: ECHOCARDIOGRAM, TRANSESOPHAGEAL;  Surgeon: Ahmet Bowers MD;  Location: [facility] CATH;  Service: Cardiology;  Laterality: N/A;    TRANSESOPHAGEAL ECHOCARDIOGRAPHY N/A 11/4/2022    Procedure: ECHOCARDIOGRAM, TRANSESOPHAGEAL;  Surgeon: Noel Carpenter MD;  Location: [facility] CATH;  Service: Cardiovascular;  Laterality: N/A;       Time Tracking:     OT Date of Treatment: 11/08/22  OT Start Time: 0356; 9:28  OT Stop Time: 0412; 9:40  OT Total Time (min): 16 min    Billable Minutes:Evaluation 20  Neuromuscular re-education 8    11/8/2022

## 2022-11-08 NOTE — PT/OT/SLP PROGRESS
Physical Therapy      Patient Name:  Luis Sorto JrTimmy   MRN:  [id number]    Patient not seen today secondary to pt appropriate for only one discipline at this time, OT following. Amy Coburn PT 11/8/22

## 2022-11-08 NOTE — ASSESSMENT & PLAN NOTE
S/p repair 11/5  Vascular surgery following, appreciate recs  Oozing resolved, will continue to monitor

## 2022-11-08 NOTE — PLAN OF CARE
[facility] - Neuro Critical Care  Initial Discharge Assessment       Primary Care Provider: Lui Santamaria MD    Admission Diagnosis: CVA (cerebral vascular accident) [I63.9]    Admission Date: 11/5/2022  Expected Discharge Date: 11/17/2022    Discharge Barriers Identified: (P) None    Payor: [facility] MCARE / Plan: [employer] MEDICARE ADVANTAGE / Product Type: Medicare Advantage /     Extended Emergency Contact Information  Primary Emergency Contact: Susan Sorto  Address: [place] [address] [place]  Mobile Phone: [phone]  Relation: Spouse  Preferred language: English   needed? No  Secondary Emergency Contact: Chris Sorto  Mobile Phone: [phone]  Relation: Son  Preferred language: English   needed? No    Discharge Plan A: (P) Home Health  Discharge Plan B: (P) Rehab      [id number] [place] - [address]  [address]  [facility] [address]  Phone: [phone] Fax: [phone]      Initial Assessment (most recent)       Adult Discharge Assessment - 11/08/22 1529          Discharge Assessment    Assessment Type Discharge Planning Assessment     Confirmed/corrected address, phone number and insurance Yes     Confirmed Demographics Correct on Facesheet     Source of Information family     When was your last doctors appointment? --   about 6 months ago    Communicated MORRIS with patient/caregiver Date not available/Unable to determine     Reason For Admission See admit diagnosis     Lives With spouse     Facility Arrived From: [facility]     Do you expect to return to your current living situation? Yes     Do you have help at home or someone to help you manage your care at home? Yes     Who are your caregiver(s) and their phone number(s)? Susan Sorto - wife - [phone]     Prior to hospitilization cognitive status: Alert/Oriented (P)      Current cognitive status: Coma/Sedated/Intubated (P)       Walking or Climbing Stairs Difficulty ambulation difficulty, requires equipment (P)      Mobility Management Rollator, WC (P)      Dressing/Bathing Difficulty none (P)      Home Layout Able to live on 1st floor (P)      Equipment Currently Used at Home wheelchair;hospital bed;rollator (P)      Readmission within 30 days? No (P)    Pt transferred from [place].    Patient currently being followed by outpatient case management? No (P)      Do you currently have service(s) that help you manage your care at home? No (P)      Do you take prescription medications? Yes (P)      Do you have prescription coverage? Yes (P)      Coverage [facility] Managed [place] (P)      Do you have any problems affording any of your prescribed medications? No (P)      Is the patient taking medications as prescribed? yes (P)      Who is going to help you get home at discharge? Susan Agee wife - [phone] (P)      How do you get to doctors appointments? family or friend will provide (P)      Are you on dialysis? No (P)      Do you take coumadin? No (P)    Pt was on Eliquis and is being transitioned to Plavix.    Discharge Plan A Home Health (P)      Discharge Plan B Rehab (P)      DME Needed Upon Discharge  other (see comments) (P)    TBD    Discharge Plan discussed with: Spouse/sig other (P)      Name(s) and Number(s) Susan guillermo - [phone] (P)      Discharge Barriers Identified None (P)         Social Connections    Are you , , , , never , or living with a partner?  (P)         Relationship/Environment    Name(s) of Who Lives With Patient Susan guillermo - [phone] (P)                         met with patient and his wife to discuss SW role and to assess for needs at discharge.  Pt was not able to participate as he is intubated and sedated.  Pt's wife agreed to SW interview.  Verified , contact information, address, and insurance from facesheet.   Patient's  wife reported that pt and her live together.  They just moved, and the address is correct on the FS. They live in a 1 story home with 0 steps to enter.  Prior to hospital admission, patient was not driving, independent with ADL's, and was use assistive devices for mobility. Pt's wife stated that he does not attend a coumadin clinic and is not on dialysis.  Transportation home upon discharge from the hospital will be provided by pt's family. Pt is not current with HH, but he has used [facility] in the past.     Durable medical equipment:   wheelchair  Rollator  Hospital Bed    Pt's wc was ordered by his PCP last month.  His rollator was his MILs.  He purchased the hospital bed about 5 years ago.  His wife may be interested in getting a new one.    Discharge Planning Booklet given to patient and discussed.  All questions addressed.  Case management will continue to follow and assist with discharge needs as needed.    AUBREY Andrade   [facility]  11/08/2022

## 2022-11-09 PROBLEM — E87.20 LACTIC ACIDOSIS: Status: RESOLVED | Noted: 2022-11-06 | Resolved: 2022-11-09

## 2022-11-09 PROBLEM — N30.00 ACUTE CYSTITIS WITHOUT HEMATURIA: Status: ACTIVE | Noted: 2022-11-09

## 2022-11-09 LAB
ALBUMIN SERPL BCP-MCNC: 1.9 G/DL (ref 3.5–5.2)
ALLENS TEST: ABNORMAL
ALP SERPL-CCNC: 305 U/L (ref 55–135)
ALT SERPL W/O P-5'-P-CCNC: 15 U/L (ref 10–44)
ANION GAP SERPL CALC-SCNC: 9 MMOL/L (ref 8–16)
APTT BLDCRRT: 30.4 SEC (ref 21–32)
AST SERPL-CCNC: 26 U/L (ref 10–40)
BASOPHILS # BLD AUTO: 0.02 K/UL (ref 0–0.2)
BASOPHILS NFR BLD: 0.2 % (ref 0–1.9)
BILIRUB SERPL-MCNC: 1.6 MG/DL (ref 0.1–1)
BUN SERPL-MCNC: 9 MG/DL (ref 8–23)
CALCIUM SERPL-MCNC: 8.2 MG/DL (ref 8.7–10.5)
CHLORIDE SERPL-SCNC: 105 MMOL/L (ref 95–110)
CO2 SERPL-SCNC: 26 MMOL/L (ref 23–29)
CREAT SERPL-MCNC: 0.5 MG/DL (ref 0.5–1.4)
DELSYS: ABNORMAL
DIFFERENTIAL METHOD: ABNORMAL
EOSINOPHIL # BLD AUTO: 0.1 K/UL (ref 0–0.5)
EOSINOPHIL NFR BLD: 1.2 % (ref 0–8)
ERYTHROCYTE [DISTWIDTH] IN BLOOD BY AUTOMATED COUNT: 15.8 % (ref 11.5–14.5)
ERYTHROCYTE [SEDIMENTATION RATE] IN BLOOD BY WESTERGREN METHOD: 14 MM/H
EST. GFR  (NO RACE VARIABLE): >60 ML/MIN/1.73 M^2
FIO2: 30
GLUCOSE SERPL-MCNC: 144 MG/DL (ref 70–110)
HCO3 UR-SCNC: 28.8 MMOL/L (ref 24–28)
HCT VFR BLD AUTO: 25.6 % (ref 40–54)
HGB BLD-MCNC: 8.4 G/DL (ref 14–18)
IMM GRANULOCYTES # BLD AUTO: 0.08 K/UL (ref 0–0.04)
IMM GRANULOCYTES NFR BLD AUTO: 0.9 % (ref 0–0.5)
INR PPP: 1.2 (ref 0.8–1.2)
LYMPHOCYTES # BLD AUTO: 1.2 K/UL (ref 1–4.8)
LYMPHOCYTES NFR BLD: 13.3 % (ref 18–48)
MAGNESIUM SERPL-MCNC: 1.9 MG/DL (ref 1.6–2.6)
MCH RBC QN AUTO: 30.9 PG (ref 27–31)
MCHC RBC AUTO-ENTMCNC: 32.8 G/DL (ref 32–36)
MCV RBC AUTO: 94 FL (ref 82–98)
MIN VOL: 7
MODE: ABNORMAL
MONOCYTES # BLD AUTO: 1 K/UL (ref 0.3–1)
MONOCYTES NFR BLD: 11.6 % (ref 4–15)
NEUTROPHILS # BLD AUTO: 6.5 K/UL (ref 1.8–7.7)
NEUTROPHILS NFR BLD: 72.8 % (ref 38–73)
NRBC BLD-RTO: 0 /100 WBC
PCO2 BLDA: 35.6 MMHG (ref 35–45)
PEEP: 5
PH SMN: 7.52 [PH] (ref 7.35–7.45)
PHOSPHATE SERPL-MCNC: 2.6 MG/DL (ref 2.7–4.5)
PIP: 32
PLATELET # BLD AUTO: 181 K/UL (ref 150–450)
PMV BLD AUTO: 9.1 FL (ref 9.2–12.9)
PO2 BLDA: 78 MMHG (ref 80–100)
POC BE: 6 MMOL/L
POC SATURATED O2: 97 % (ref 95–100)
POC TCO2: 30 MMOL/L (ref 23–27)
POCT GLUCOSE: 160 MG/DL (ref 70–110)
POCT GLUCOSE: 161 MG/DL (ref 70–110)
POCT GLUCOSE: 169 MG/DL (ref 70–110)
POCT GLUCOSE: 182 MG/DL (ref 70–110)
POCT GLUCOSE: 185 MG/DL (ref 70–110)
POCT GLUCOSE: 190 MG/DL (ref 70–110)
POTASSIUM SERPL-SCNC: 3.6 MMOL/L (ref 3.5–5.1)
PROT SERPL-MCNC: 5.5 G/DL (ref 6–8.4)
PROTHROMBIN TIME: 12 SEC (ref 9–12.5)
RBC # BLD AUTO: 2.72 M/UL (ref 4.6–6.2)
SAMPLE: ABNORMAL
SITE: ABNORMAL
SODIUM SERPL-SCNC: 140 MMOL/L (ref 136–145)
SP02: 95
VT: 500
WBC # BLD AUTO: 8.97 K/UL (ref 3.9–12.7)

## 2022-11-09 PROCEDURE — 99900026 HC AIRWAY MAINTENANCE (STAT)

## 2022-11-09 PROCEDURE — 27200966 HC CLOSED SUCTION SYSTEM

## 2022-11-09 PROCEDURE — 99291 CRITICAL CARE FIRST HOUR: CPT | Mod: GC,,, | Performed by: PSYCHIATRY & NEUROLOGY

## 2022-11-09 PROCEDURE — 99900035 HC TECH TIME PER 15 MIN (STAT)

## 2022-11-09 PROCEDURE — 82803 BLOOD GASES ANY COMBINATION: CPT

## 2022-11-09 PROCEDURE — 20000000 HC ICU ROOM

## 2022-11-09 PROCEDURE — 80053 COMPREHEN METABOLIC PANEL: CPT | Performed by: PHYSICIAN ASSISTANT

## 2022-11-09 PROCEDURE — 63600175 PHARM REV CODE 636 W HCPCS

## 2022-11-09 PROCEDURE — 63600175 PHARM REV CODE 636 W HCPCS: Performed by: PSYCHIATRY & NEUROLOGY

## 2022-11-09 PROCEDURE — 25000003 PHARM REV CODE 250: Performed by: PHYSICIAN ASSISTANT

## 2022-11-09 PROCEDURE — A4216 STERILE WATER/SALINE, 10 ML: HCPCS | Performed by: PHYSICIAN ASSISTANT

## 2022-11-09 PROCEDURE — 94668 MNPJ CHEST WALL SBSQ: CPT

## 2022-11-09 PROCEDURE — 85730 THROMBOPLASTIN TIME PARTIAL: CPT | Performed by: PHYSICIAN ASSISTANT

## 2022-11-09 PROCEDURE — 27000221 HC OXYGEN, UP TO 24 HOURS

## 2022-11-09 PROCEDURE — 25000003 PHARM REV CODE 250: Performed by: PSYCHIATRY & NEUROLOGY

## 2022-11-09 PROCEDURE — 85025 COMPLETE CBC W/AUTO DIFF WBC: CPT | Performed by: PHYSICIAN ASSISTANT

## 2022-11-09 PROCEDURE — 85610 PROTHROMBIN TIME: CPT | Performed by: PHYSICIAN ASSISTANT

## 2022-11-09 PROCEDURE — 99291 PR CRITICAL CARE, E/M 30-74 MINUTES: ICD-10-PCS | Mod: GC,,, | Performed by: PSYCHIATRY & NEUROLOGY

## 2022-11-09 PROCEDURE — 94761 N-INVAS EAR/PLS OXIMETRY MLT: CPT

## 2022-11-09 PROCEDURE — 83735 ASSAY OF MAGNESIUM: CPT | Performed by: PHYSICIAN ASSISTANT

## 2022-11-09 PROCEDURE — 25000003 PHARM REV CODE 250

## 2022-11-09 PROCEDURE — 36600 WITHDRAWAL OF ARTERIAL BLOOD: CPT

## 2022-11-09 PROCEDURE — 94003 VENT MGMT INPAT SUBQ DAY: CPT

## 2022-11-09 PROCEDURE — 25000003 PHARM REV CODE 250: Performed by: NURSE PRACTITIONER

## 2022-11-09 PROCEDURE — 97112 NEUROMUSCULAR REEDUCATION: CPT

## 2022-11-09 PROCEDURE — 84100 ASSAY OF PHOSPHORUS: CPT | Performed by: PHYSICIAN ASSISTANT

## 2022-11-09 RX ORDER — FENTANYL CITRATE 50 UG/ML
25 INJECTION, SOLUTION INTRAMUSCULAR; INTRAVENOUS
Status: DISCONTINUED | OUTPATIENT
Start: 2022-11-09 | End: 2022-11-14

## 2022-11-09 RX ORDER — LABETALOL HCL 20 MG/4 ML
10 SYRINGE (ML) INTRAVENOUS EVERY 4 HOURS PRN
Status: DISCONTINUED | OUTPATIENT
Start: 2022-11-10 | End: 2022-11-10

## 2022-11-09 RX ORDER — INSULIN ASPART 100 [IU]/ML
8 INJECTION, SOLUTION INTRAVENOUS; SUBCUTANEOUS
Status: DISCONTINUED | OUTPATIENT
Start: 2022-11-09 | End: 2022-11-10

## 2022-11-09 RX ORDER — BISACODYL 10 MG
10 SUPPOSITORY, RECTAL RECTAL DAILY PRN
Status: DISCONTINUED | OUTPATIENT
Start: 2022-11-09 | End: 2022-11-14

## 2022-11-09 RX ORDER — AMOXICILLIN 250 MG
2 CAPSULE ORAL 2 TIMES DAILY
Status: DISCONTINUED | OUTPATIENT
Start: 2022-11-09 | End: 2022-11-12

## 2022-11-09 RX ADMIN — SENNOSIDES AND DOCUSATE SODIUM 1 TABLET: 50; 8.6 TABLET ORAL at 08:11

## 2022-11-09 RX ADMIN — Medication 3 ML: at 02:11

## 2022-11-09 RX ADMIN — LEVETIRACETAM 500 MG: 500 SOLUTION ORAL at 08:11

## 2022-11-09 RX ADMIN — MUPIROCIN: 20 OINTMENT TOPICAL at 08:11

## 2022-11-09 RX ADMIN — INSULIN ASPART 6 UNITS: 100 INJECTION, SOLUTION INTRAVENOUS; SUBCUTANEOUS at 03:11

## 2022-11-09 RX ADMIN — INSULIN ASPART 1 UNITS: 100 INJECTION, SOLUTION INTRAVENOUS; SUBCUTANEOUS at 12:11

## 2022-11-09 RX ADMIN — INSULIN ASPART 6 UNITS: 100 INJECTION, SOLUTION INTRAVENOUS; SUBCUTANEOUS at 11:11

## 2022-11-09 RX ADMIN — FENTANYL CITRATE 50 MCG: 0.05 INJECTION, SOLUTION INTRAMUSCULAR; INTRAVENOUS at 04:11

## 2022-11-09 RX ADMIN — Medication: at 08:11

## 2022-11-09 RX ADMIN — POTASSIUM & SODIUM PHOSPHATES POWDER PACK 280-160-250 MG 2 PACKET: 280-160-250 PACK at 04:11

## 2022-11-09 RX ADMIN — INSULIN ASPART 8 UNITS: 100 INJECTION, SOLUTION INTRAVENOUS; SUBCUTANEOUS at 08:11

## 2022-11-09 RX ADMIN — INSULIN ASPART 6 UNITS: 100 INJECTION, SOLUTION INTRAVENOUS; SUBCUTANEOUS at 08:11

## 2022-11-09 RX ADMIN — POLYETHYLENE GLYCOL 3350 17 G: 17 POWDER, FOR SOLUTION ORAL at 08:11

## 2022-11-09 RX ADMIN — INSULIN ASPART 1 UNITS: 100 INJECTION, SOLUTION INTRAVENOUS; SUBCUTANEOUS at 08:11

## 2022-11-09 RX ADMIN — INSULIN ASPART 2 UNITS: 100 INJECTION, SOLUTION INTRAVENOUS; SUBCUTANEOUS at 03:11

## 2022-11-09 RX ADMIN — SENNOSIDES AND DOCUSATE SODIUM 2 TABLET: 50; 8.6 TABLET ORAL at 08:11

## 2022-11-09 RX ADMIN — FAMOTIDINE 20 MG: 20 TABLET ORAL at 08:11

## 2022-11-09 RX ADMIN — Medication 3 ML: at 09:11

## 2022-11-09 RX ADMIN — METOPROLOL TARTRATE 50 MG: 50 TABLET, FILM COATED ORAL at 08:11

## 2022-11-09 RX ADMIN — INSULIN ASPART 6 UNITS: 100 INJECTION, SOLUTION INTRAVENOUS; SUBCUTANEOUS at 12:11

## 2022-11-09 RX ADMIN — ATORVASTATIN CALCIUM 40 MG: 40 TABLET, FILM COATED ORAL at 08:11

## 2022-11-09 RX ADMIN — LABETALOL HYDROCHLORIDE 10 MG: 5 INJECTION, SOLUTION INTRAVENOUS at 01:11

## 2022-11-09 RX ADMIN — LABETALOL HYDROCHLORIDE 10 MG: 5 INJECTION, SOLUTION INTRAVENOUS at 06:11

## 2022-11-09 RX ADMIN — ACETAMINOPHEN 650 MG: 325 TABLET ORAL at 02:11

## 2022-11-09 RX ADMIN — POTASSIUM & SODIUM PHOSPHATES POWDER PACK 280-160-250 MG 2 PACKET: 280-160-250 PACK at 08:11

## 2022-11-09 RX ADMIN — CEFTRIAXONE 2 G: 2 INJECTION, SOLUTION INTRAVENOUS at 09:11

## 2022-11-09 RX ADMIN — ACETAMINOPHEN 650 MG: 325 TABLET ORAL at 08:11

## 2022-11-09 RX ADMIN — DEXMEDETOMIDINE HYDROCHLORIDE 0.1 MCG/KG/HR: 4 INJECTION, SOLUTION INTRAVENOUS at 08:11

## 2022-11-09 RX ADMIN — LISINOPRIL 20 MG: 20 TABLET ORAL at 08:11

## 2022-11-09 RX ADMIN — INSULIN ASPART 2 UNITS: 100 INJECTION, SOLUTION INTRAVENOUS; SUBCUTANEOUS at 11:11

## 2022-11-09 RX ADMIN — BISACODYL 10 MG: 10 SUPPOSITORY RECTAL at 11:11

## 2022-11-09 RX ADMIN — Medication 3 ML: at 06:11

## 2022-11-09 RX ADMIN — LABETALOL HYDROCHLORIDE 10 MG: 5 INJECTION, SOLUTION INTRAVENOUS at 04:11

## 2022-11-09 RX ADMIN — LABETALOL HYDROCHLORIDE 10 MG: 5 INJECTION, SOLUTION INTRAVENOUS at 11:11

## 2022-11-09 RX ADMIN — HYDRALAZINE HYDROCHLORIDE 10 MG: 20 INJECTION, SOLUTION INTRAMUSCULAR; INTRAVENOUS at 09:11

## 2022-11-09 RX ADMIN — POTASSIUM BICARBONATE 50 MEQ: 978 TABLET, EFFERVESCENT ORAL at 04:11

## 2022-11-09 RX ADMIN — INSULIN ASPART 2 UNITS: 100 INJECTION, SOLUTION INTRAVENOUS; SUBCUTANEOUS at 08:11

## 2022-11-09 NOTE — PT/OT/SLP PROGRESS
Occupational Therapy   Treatment    Name: Luis Sorto Jr.  MRN: [id number]  Admitting Diagnosis:  Cerebrovascular accident (CVA) due to embolism of left middle cerebral artery  2 Days Post-Op    Recommendations:     Discharge Recommendations:  (pending extubation)  Discharge Equipment Recommendations:   (pending extubation)  Barriers to discharge:  None    Assessment:     Luis Sorto Jr. is a 76 y.o. male with a medical diagnosis of Cerebrovascular accident (CVA) due to embolism of left middle cerebral artery.  He presents with performance deficits affecting function are weakness, abnormal tone, decreased ROM, impaired cognition, impaired endurance, impaired sensation, decreased coordination, decreased upper extremity function, impaired self care skills, impaired functional mobility, decreased lower extremity function, decreased safety awareness, gait instability, impaired balance.     Rehab Prognosis:  Good; patient would benefit from acute skilled OT services to address these deficits and reach maximum level of function.       Plan:     Patient to be seen 3 x/week to address the above listed problems via sensory integration, neuromuscular re-education, cognitive retraining, therapeutic exercises, therapeutic activities, self-care/home management  Plan of Care Expires: 12/04/22  Plan of Care Reviewed with: patient    Subjective   Patient:  Nonverbal; intubated  Pain/Comfort:  Pain Rating 1: 0/10  Pain Rating Post-Intervention 1: 0/10    Objective:     Communicated with: Nurse prior to session.  Patient found supine with bed alarm, ventilator, telemetry, pressure relief boots, blood pressure cuff, peripheral IV, pulse ox (continuous), NEHA drain, restraints, bhardwaj catheter, wound vac upon OT entry to room.    General Precautions: Standard, aspiration, fall, NPO   Orthopedic Precautions:N/A   Braces: N/A  Respiratory Status:  ventilator     Occupational Performance:     Bed Mobility:    Patient completed  Rolling/Turning to Left with  total assistance  Patient completed Rolling/Turning to Right with total assistance     Functional Mobility/Transfers:  Dependent drawsheet transfers    Activities of Daily Living:  Feeding:  NPO    Grooming: total assistance while supine    [facility] 6 Click ADL: 6    Treatment & Education:  Patient education provided on role of OT.  Continued education, patient/ family training recommended.  Daily orientation provided.  PROM performed bilateral UE/LEs one set x 10 rep in all planes of motion with stretches provided at end range; sustained stretch provided for ankle dorsiflexion.  Assistance and facilitation provided for upward rotation of the scapula during shoulder flexion and abduction to promote orientation of glenoid fossa of scapula to humeral head for prevention of post-stroke hemiplegic pain.  Positioning provided for midline orientation with bilateral UEs elevated and heels lifted off mattress.    Patient left supine with all lines intact, call button in reach, and bed alarm on    GOALS:   Multidisciplinary Problems       Occupational Therapy Goals          Problem: Occupational Therapy    Goal Priority Disciplines Outcome Interventions   Occupational Therapy Goal     OT, PT/OT Ongoing, Progressing    Description: Goals set 11/8 to be addressed for 14 days with expiration date, 11/22:  Patient will increase functional independence with ADLs by performing:    Patient will demonstrate rolling to the right with max assist.  Not met   Patient will demonstrate rolling to the left with max assist.   Not met  Patient will demonstrate supine -sit with max  assist.   Not met  Patient will demonstrate stand pivot transfers with max assist.   Not met  Patient will demonstrate grooming while seated with max assist.   Not met  Patient will demonstrate upper body dressing with max assist while seated EOB.   Not met  Patient will demonstrate lower body dressing with max assist while seated EOB.    Not met  Patient will demonstrate toileting with max assist.   Not met  Patient's family / caregiver will demonstrate independence and safety with assisting patient with self-care skills and functional mobility.     Not met  Patient's family / caregiver will demonstrate independence with providing ROM and changes in bed positioning.   Not met  Patient and/or patient's family will verbalize understanding of stroke prevention guidelines, personal risk factors and stroke warning signs via teachback method.  Not met                                  Time Tracking:     OT Date of Treatment: 11/09/22  OT Start Time: 0447  OT Stop Time: 0458  OT Total Time (min): 11 min    Billable Minutes:Neuromuscular Re-education 11    OT/TIFFANY: OT          11/9/2022

## 2022-11-09 NOTE — ASSESSMENT & PLAN NOTE
76M h/o AFib on Eliquis,CAD, HTN, DM II, LAAO w/Amulet procedure on 11/04/2022, who presents as a transfer from [facility] for evaluation of L MCA stroke w/ICH (ICH Score 1). OSH CTH with multifocal left ICH. CTA negative for vascular lesions. Patient was intubated at OSH. Cryo was ordered at the outside hospital but not given. On arrival to [facility], CTH repeat with occipital ICH component decompressing into occipital horn. No HCP. Bleeds grossly stable.     --Patient admitted to [facility] on telemetry      -q1h neurochecks in ICU, q2h neurochecks in stepdown, q4h neurochecks on floor  --All labs and diagnostics reviewed  --MRI Brain w/wo to evaluate for underlying lesion on 11/5 showing stable hemorrhage; multifocal expansile T2/FLAIR hyperintense signal with associated restricted diffusion disproportionately affecting the posterior cerebral hemispheric cortex adjoining white matter (concerning for recent ischemia versus PRES), and restricted diffusion in R cerebellum (concerning for recent ischemia)  --CTH 11/6 showing persistent hemorrhage, stable on same day repeat  --Vasculitis workup per NCC  --Further care per NCC  --NSY will sign off. Please page with questions. Thank you for this interesting consult.     Plan d/w Dr. Cortez.     Dispo: per primary

## 2022-11-09 NOTE — SUBJECTIVE & OBJECTIVE
Interval History: 11/8: NAEON. AFVSS. Exam stable.     Medications:  Continuous Infusions:   dexmedetomidine (PRECEDEX) infusion 0.1 mcg/kg/hr (11/08/22 1730)    dextrose 10 % in water (D10W)       Scheduled Meds:   atorvastatin  40 mg Per OG tube Daily    balsam peru-castor oiL   Topical (Top) BID    famotidine  20 mg Per OG tube BID    insulin aspart U-100  6 Units Subcutaneous 6 times per day    levetiracetam  500 mg Per OG tube BID    lisinopriL  20 mg Per OG tube Daily    metoprolol tartrate  50 mg Per OG tube BID    mupirocin   Nasal BID    polyethylene glycol  17 g Per NG tube Daily    senna-docusate 8.6-50 mg  1 tablet Per OG tube BID    sodium chloride 0.9%  3 mL Intravenous Q8H     PRN Meds:acetaminophen, dextrose 10 % in water (D10W), dextrose 10%, dextrose 10%, fentaNYL, glucagon (human recombinant), hydrALAZINE, insulin aspart U-100, labetalol, magnesium oxide, magnesium oxide, metoprolol, ondansetron, potassium bicarbonate, potassium bicarbonate, potassium bicarbonate, potassium, sodium phosphates, potassium, sodium phosphates, potassium, sodium phosphates     Review of Systems  Objective:     Weight: 116.6 kg (257 lb)  Body mass index is 37.95 kg/m².  Vital Signs (Most Recent):  Temp: (!) 100.6 °F (38.1 °C) (11/08/22 1700)  Pulse: 88 (11/08/22 1700)  Resp: 17 (11/08/22 1700)  BP: (!) 128/59 (11/08/22 1700)  SpO2: 97 % (11/08/22 1700)   Vital Signs (24h Range):  Temp:  [99.5 °F (37.5 °C)-100.9 °F (38.3 °C)] 100.6 °F (38.1 °C)  Pulse:  [] 88  Resp:  [14-22] 17  SpO2:  [97 %-99 %] 97 %  BP: (106-194)/(53-86) 128/59     Date 11/08/22 0700 - 11/09/22 0659   Shift 7549-8229 5757-0649 5941-3526 24 Hour Total   INTAKE   I.V.(mL/kg) 134(1.1) 23.5(0.2)  157.4(1.4)   NG/ 160  660   Shift Total(mL/kg) 634(5.4) 183.5(1.6)  817.4(7)   OUTPUT   Urine(mL/kg/hr) 795(0.9) 205  1000   Shift Total(mL/kg) 795(6.8) 205(1.8)  1000(8.6)   Weight (kg) 116.6 116.6 116.6 116.6              Vent Mode: Spont  Oxygen  Concentration (%):  [30-40] 30  Resp Rate Total:  [16 br/min-29 br/min] 17 br/min  Vt Set:  [500 mL] 500 mL  PEEP/CPAP:  [5 cmH20] 5 cmH20  Pressure Support:  [5 cmH20-10 cmH20] 10 cmH20  Mean Airway Pressure:  [7.2 ppC07-86 cmH20] 9.1 cmH20         Closed/Suction Drain 11/05/22 1741 Right;Anterior Groin Bulb 15 Fr. (Active)   Site Description Ecchymotic 11/08/22 1500   Dressing Type Gauze 11/08/22 1500   Dressing Status Dry;Intact 11/08/22 1500   Dressing Intervention Integrity maintained 11/08/22 1500   Drainage Sanguineous 11/08/22 1500   Status To bulb suction 11/08/22 1500   Output (mL) 20 mL 11/08/22 0501            Closed/Suction Drain 11/05/22 1742 Right;Anterior Hip Bulb 15 Fr. (Active)   Site Description Ecchymotic;Edema/swelling 11/08/22 1500   Dressing Type Gauze 11/08/22 1500   Dressing Status Dry;Intact 11/08/22 1500   Dressing Intervention Integrity maintained 11/08/22 1500   Drainage Sanguineous 11/08/22 1500   Status To bulb suction 11/08/22 1500   Output (mL) 20 mL 11/08/22 0501            NG/OG Tube 11/05/22 0333 orogastric 18 Fr. Center mouth (Active)   Placement Check placement verified by distal tube length measurement;placement verified by x-ray 11/08/22 1500   Tolerance no signs/symptoms of discomfort 11/08/22 1500   Securement secured to commercial device 11/08/22 1500   Clamp Status/Tolerance unclamped 11/08/22 1500   Suction Setting/Drainage Method suction at the bedside 11/08/22 1500   Insertion Site Appearance no redness, warmth, tenderness, skin breakdown, drainage 11/08/22 1500   Drainage None 11/06/22 0701   Flush/Irrigation flushed w/;water;no resistance met 11/06/22 0701   Feeding Type continuous;by pump 11/08/22 0301   Feeding Action feeding continued 11/08/22 1500   Current Rate (mL/hr) 30 mL/hr 11/08/22 0700   Goal Rate (mL/hr) 40 mL/hr 11/08/22 0700   Intake (mL) 50 mL 11/08/22 1200   Water Bolus (mL) 30 mL 11/05/22 1001   Tube Output(mL)(Include Discarded Residual) 160 mL  "11/05/22 1001   Formula Name impact peptide 11/08/22 1500   Intake (mL) - Formula Tube Feeding 55 11/08/22 1700   Residual Amount (ml) 0 ml 11/06/22 0701            Urethral Catheter 11/05/22 0630 Non-latex 16 Fr. (Active)   Site Assessment Clean;Intact 11/08/22 1500   Collection Container Urimeter 11/08/22 1500   Securement Method secured to top of thigh w/ adhesive device 11/08/22 1500   Catheter Care Performed yes 11/08/22 1500   Reason for Continuing Urinary Catheterization Critically ill in ICU and requiring hourly monitoring of intake/output 11/08/22 1500   CAUTI Prevention Bundle Securement Device in place with 1" slack;Intact seal between catheter & drainage tubing;Drainage bag/urimeter off the floor;Sheeting clip in use;No dependent loops or kinks;Drainage bag/urimeter not overfilled (<2/3 full);Drainage bag/urimeter below bladder 11/08/22 1100   Output (mL) 45 mL 11/08/22 1700       Physical Exam    Neurosurgery Physical Exam    E3VtM5  Sedated, intubated  Bsi  PERRL  L - loc/spon  RUE - min WD  RLE plegic    Significant Labs:  Recent Labs   Lab 11/07/22  0214 11/08/22  0215   * 148*    141   K 3.6 3.3*    110   CO2 22* 22*   BUN 8 7*   CREATININE 0.6 0.6   CALCIUM 8.1* 7.7*   MG 1.9 1.7     Recent Labs   Lab 11/07/22  0305 11/08/22  0215 11/08/22  0358   WBC 13.55* 9.77 9.91   HGB 7.3* 7.1* 7.6*   HCT 22.3* 21.8* 23.3*   * 143* 141*     Recent Labs   Lab 11/07/22  0305 11/08/22  0215   INR  --  1.2   APTT 30.0 30.8     Microbiology Results (last 7 days)       ** No results found for the last 168 hours. **          All pertinent labs from the last 24 hours have been reviewed.    Significant Diagnostics:  I have reviewed all pertinent imaging results/findings within the past 24 hours.  X-Ray Chest 1 View    Result Date: 11/8/2022  No significant change from prior study. Electronically signed by: Juan Antonio Ko MD Date:    11/08/2022 Time:    04:51   "

## 2022-11-09 NOTE — CARE UPDATE
11/09/22 0946   PRE-TX-O2   Oxygen Concentration (%) 30   SpO2 98 %   Pulse 105   Resp (!) 23   Temp (!) 101.3 °F (38.5 °C)        Airway - Non-Surgical 11/05/22 0328   Placement Date/Time: 11/05/22 0328   Method of Intubation: Glidescope;Bougie Intubation  Inserted by: MD  Staff/Resident Name(s): Dr. Walker  Airway Device Size: 8.0  Depth of Insertion (cm): 22  Secured at: Lips   Secured at (S)  24 cm  (Advanced ETT per Respiratory comminication)   Measured At Lips   Secured Location Right   Secured by Commercial tube bradley   Bite Block right;secure and patent   Site Condition Cool;Dry   Status Intact;Secured;Patent   Site Assessment Clean;Dry;No bleeding;No drainage   Cuff Pressure 27 cm H2O   Vent Select   Charged w/in last 24h YES   Preset Conventional Ventilator Settings   Ventilation Type VC   Vent Mode Spont   PEEP/CPAP 5 cmH20   Pressure Support 8 cmH20   Insp Rise Time  70 %   I-Trigger Type  V-TRIG   Trigger Sensitivity Flow/I-Trigger 3 L/min   Patient Ventilator Parameters   Resp Rate Total 21 br/min   Peak Airway Pressure 13 cmH2O   Mean Airway Pressure 7.9 cmH20   Plateau Pressure 0 cmH20   Exhaled Vt 359 mL   Total Ve 9.41 L/m   Spont Ve 9.41 L   I:E Ratio Measured 1:2.20   Auto PEEP 0 cmH20   Inspired Tidal Volume (VTI) 387 mL   Conventional Ventilator Alarms   Ve High Alarm 24 L/min   Ve Low Alarm 2.5 L/min   Resp Rate High Alarm 45 br/min   Press High Alarm 60 cmH2O   Apnea Rate 16   Apnea Volume (mL) 440 mL   Apnea Oxygen Concentration  100   Apnea Flow Rate (L/min) 60   T Apnea 20 sec(s)   Ready to Wean/Extubation Screen   FIO2<=50 (chart decimal) 0.3   MV<16L (chart vol.) 9.41   PEEP <=8 (chart #) 5   Ready to Wean Parameters   F/VT Ratio<105 (RSBI) (!) 64.07   Negative Inspiratory Force (cm H2O) 0   Vital Capacity (mL) 0

## 2022-11-09 NOTE — PLAN OF CARE
SW received call from Pt wife regarding confusion about what his dc plan would be since he's not following commands and still on vent. SW discussed that Pt is being followed and based on the medical plan and his needs closer to dc, we would work with her on what's the most appropriate plan for him to dc. Discussed different types of dc and that she will be presented with options once Pt is closer to being ready to leave the hospital.     Ros Figueroa LCSW  Neurocritical Care   [facility]  [id number]

## 2022-11-09 NOTE — PLAN OF CARE
Problem: Restraint, Nonbehavioral (Nonviolent)  Goal: Absence of Harm or Injury  Outcome: Ongoing, Progressing     Problem: Infection  Goal: Absence of Infection Signs and Symptoms  Outcome: Ongoing, Progressing   [facility] Care Plan    POC reviewed with Luis Sorto Jr. and family at 0300. Pt unable to verbalize understanding. Questions and concerns addressed with wife at bedside. No acute events overnight. Pt progressing toward goals. Will continue to monitor. See below and flowsheets for full assessment and VS info.           Is this a stroke patient? yes- Stroke booklet reviewed with patient and family, risk factors identified for patient and stroke booklet remains at bedside for ongoing education.     Neuro:  Dunia Coma Scale  Best Eye Response: 2-->(E2) to pain  Best Motor Response: 4-->(M4) withdraws from pain  Best Verbal Response: 1-->(V1) none  Dunia Coma Scale Score: 7  Assessment Qualifiers: patient intubated  Pupil PERRLA: yes     24hr Temp:  [99.5 °F (37.5 °C)-101.3 °F (38.5 °C)]     CV:   Rhythm: atrial rhythm  BP goals:   SBP < 140  MAP > 65    Resp:   O2 Device (Oxygen Therapy): ventilator  Vent Mode: A/C  Set Rate: 14 BPM  Oxygen Concentration (%): 30  Vt Set: 500 mL  PEEP/CPAP: 5 cmH20  Pressure Support: 10 cmH20    Plan: wean to extubate    GI/:     Diet/Nutrition Received: tube feeding  Last Bowel Movement: 11/04/22  Voiding Characteristics: urethral catheter (bladder)    Intake/Output Summary (Last 24 hours) at 11/9/2022 0603  Last data filed at 11/9/2022 0501  Gross per 24 hour   Intake 1904.15 ml   Output 2415 ml   Net -510.85 ml          Labs/Accuchecks:  Recent Labs   Lab 11/09/22  0338   WBC 8.97   RBC 2.72*   HGB 8.4*   HCT 25.6*         Recent Labs   Lab 11/09/22  0252      K 3.6   CO2 26      BUN 9   CREATININE 0.5   ALKPHOS 305*   ALT 15   AST 26   BILITOT 1.6*      Recent Labs   Lab 11/09/22 0252   INR 1.2   APTT 30.4      Recent Labs   Lab 11/05/22  0632  11/05/22  1201 11/06/22  0003     --   --    CPKMB 2.4  --   --    TROPONINI 0.141*   < > 0.053*   MB 2.1  --   --     < > = values in this interval not displayed.       Electrolytes: Electrolytes replaced  Accuchecks: Q4H    Gtts:   dexmedetomidine (PRECEDEX) infusion 0.1 mcg/kg/hr (11/08/22 1800)    dextrose 10 % in water (D10W)         LDA/Wounds:  Lines/Drains/Airways       Drain  Duration                  NG/OG Tube 11/05/22 0333 orogastric 18 Fr. Center mouth 4 days         Urethral Catheter 11/05/22 0630 Non-latex 16 Fr. 4 days         Closed/Suction Drain 11/05/22 1741 Right;Anterior Groin Bulb 15 Fr. 3 days         Closed/Suction Drain 11/05/22 1742 Right;Anterior Hip Bulb 15 Fr. 3 days              Airway  Duration                  Airway - Non-Surgical 11/05/22 0328 4 days              Peripheral Intravenous Line  Duration                  Peripheral IV - Single Lumen 11/05/22 20 G Anterior;Right Upper Arm 4 days         Peripheral IV - Single Lumen 11/09/22 18 G Anterior;Distal;Left Upper Arm <1 day         Peripheral IV - Single Lumen 11/09/22 20 G Anterior;Left Forearm <1 day                  Wounds: Yes  Wound care consulted: Yes

## 2022-11-09 NOTE — CARE UPDATE
Vt decreased to 250-300mL, RR increased to 35, and SpO2 <92%. Changed vent settings to AC 14/500/+5/30%. ET tube accessed for leaks and placement. Inflated cuff and advanced the ET 2cm, now 24cm lip. Vt 450-500mL and SpO2 >96%.  Patient tolerating well. No distress noted. ABG to follow. Will continue to monitor.

## 2022-11-09 NOTE — PLAN OF CARE
Goals remain appropriate.  Problem: Occupational Therapy  Goal: Occupational Therapy Goal  Description: Goals set 11/8 to be addressed for 14 days with expiration date, 11/22:  Patient will increase functional independence with ADLs by performing:    Patient will demonstrate rolling to the right with max assist.  Not met   Patient will demonstrate rolling to the left with max assist.   Not met  Patient will demonstrate supine -sit with max  assist.   Not met  Patient will demonstrate stand pivot transfers with max assist.   Not met  Patient will demonstrate grooming while seated with max assist.   Not met  Patient will demonstrate upper body dressing with max assist while seated EOB.   Not met  Patient will demonstrate lower body dressing with max assist while seated EOB.   Not met  Patient will demonstrate toileting with max assist.   Not met  Patient's family / caregiver will demonstrate independence and safety with assisting patient with self-care skills and functional mobility.     Not met  Patient's family / caregiver will demonstrate independence with providing ROM and changes in bed positioning.   Not met  Patient and/or patient's family will verbalize understanding of stroke prevention guidelines, personal risk factors and stroke warning signs via teachback method.  Not met             Outcome: Ongoing, Progressing

## 2022-11-09 NOTE — PROGRESS NOTES
[facility] - Neuro Critical Care  Neurosurgery  Progress Note    Subjective:     History of Present Illness: 76M h/o AFib on Eliquis,CAD, HTN, DM II, LAAO w/Amulet procedure on 11/04/2022, who presents as a transfer from [facility] for evaluation of L MCA stroke w/ICH. Patient was LKN around 2000.  He had been discharged home s/p LAAO w/Amulet device on 11/4/2022 around 1000 am. He acutely developed confusion with difficulty walking and standing, difficulty conversing, and difficulty finding his bathroom.  He presented to [facility] ED where a CTH was obtained and was negative for acute findings.Telestroke rec chemical thrombolytic; administered as the patient had been off of Eliquis >24h. Of note, per the ED record patient was noted to have increased pain and swelling in the right groin in the area of the venous access from his Cardiology procedure and then the patient began to have bleeding and bruising in the anterior thigh on he right side. Patient became hemodynamically unstable and was started on Levophed. CTH with multifocal left ICH. CTA negative for vascular lesions. Patient was intubated at OSH. Cryo was ordered at the outside hospital but not given. On arrival to [facility], CTH repeat with occipital ICH component decompressing into occipital horn. No HCP. Bleeds grossly stable.       Post-Op Info:  Procedure(s) (LRB):  ECHOCARDIOGRAM,TRANSESOPHAGEAL (N/A)   1 Day Post-Op     Interval History: 11/8: NAEON. AFVSS. Exam stable.     Medications:  Continuous Infusions:   dexmedetomidine (PRECEDEX) infusion 0.1 mcg/kg/hr (11/08/22 1730)    dextrose 10 % in water (D10W)       Scheduled Meds:   atorvastatin  40 mg Per OG tube Daily    balsam peru-castor oiL   Topical (Top) BID    famotidine  20 mg Per OG tube BID    insulin aspart U-100  6 Units Subcutaneous 6 times per day    levetiracetam  500 mg Per OG tube BID    lisinopriL  20 mg Per OG tube Daily    metoprolol tartrate  50  mg Per OG tube BID    mupirocin   Nasal BID    polyethylene glycol  17 g Per NG tube Daily    senna-docusate 8.6-50 mg  1 tablet Per OG tube BID    sodium chloride 0.9%  3 mL Intravenous Q8H     PRN Meds:acetaminophen, dextrose 10 % in water (D10W), dextrose 10%, dextrose 10%, fentaNYL, glucagon (human recombinant), hydrALAZINE, insulin aspart U-100, labetalol, magnesium oxide, magnesium oxide, metoprolol, ondansetron, potassium bicarbonate, potassium bicarbonate, potassium bicarbonate, potassium, sodium phosphates, potassium, sodium phosphates, potassium, sodium phosphates     Review of Systems  Objective:     Weight: 116.6 kg (257 lb)  Body mass index is 37.95 kg/m².  Vital Signs (Most Recent):  Temp: (!) 100.6 °F (38.1 °C) (11/08/22 1700)  Pulse: 88 (11/08/22 1700)  Resp: 17 (11/08/22 1700)  BP: (!) 128/59 (11/08/22 1700)  SpO2: 97 % (11/08/22 1700)   Vital Signs (24h Range):  Temp:  [99.5 °F (37.5 °C)-100.9 °F (38.3 °C)] 100.6 °F (38.1 °C)  Pulse:  [] 88  Resp:  [14-22] 17  SpO2:  [97 %-99 %] 97 %  BP: (106-194)/(53-86) 128/59     Date 11/08/22 0700 - 11/09/22 0659   Shift 8100-7196 3704-4124 6284-2377 24 Hour Total   INTAKE   I.V.(mL/kg) 134(1.1) 23.5(0.2)  157.4(1.4)   NG/ 160  660   Shift Total(mL/kg) 634(5.4) 183.5(1.6)  817.4(7)   OUTPUT   Urine(mL/kg/hr) 795(0.9) 205  1000   Shift Total(mL/kg) 795(6.8) 205(1.8)  1000(8.6)   Weight (kg) 116.6 116.6 116.6 116.6              Vent Mode: Spont  Oxygen Concentration (%):  [30-40] 30  Resp Rate Total:  [16 br/min-29 br/min] 17 br/min  Vt Set:  [500 mL] 500 mL  PEEP/CPAP:  [5 cmH20] 5 cmH20  Pressure Support:  [5 cmH20-10 cmH20] 10 cmH20  Mean Airway Pressure:  [7.2 eeN35-38 cmH20] 9.1 cmH20         Closed/Suction Drain 11/05/22 1741 Right;Anterior Groin Bulb 15 Fr. (Active)   Site Description Ecchymotic 11/08/22 1500   Dressing Type Gauze 11/08/22 1500   Dressing Status Dry;Intact 11/08/22 1500   Dressing Intervention Integrity maintained 11/08/22  1500   Drainage Sanguineous 11/08/22 1500   Status To bulb suction 11/08/22 1500   Output (mL) 20 mL 11/08/22 0501            Closed/Suction Drain 11/05/22 1742 Right;Anterior Hip Bulb 15 Fr. (Active)   Site Description Ecchymotic;Edema/swelling 11/08/22 1500   Dressing Type Gauze 11/08/22 1500   Dressing Status Dry;Intact 11/08/22 1500   Dressing Intervention Integrity maintained 11/08/22 1500   Drainage Sanguineous 11/08/22 1500   Status To bulb suction 11/08/22 1500   Output (mL) 20 mL 11/08/22 0501            NG/OG Tube 11/05/22 0333 orogastric 18 Fr. Center mouth (Active)   Placement Check placement verified by distal tube length measurement;placement verified by x-ray 11/08/22 1500   Tolerance no signs/symptoms of discomfort 11/08/22 1500   Securement secured to commercial device 11/08/22 1500   Clamp Status/Tolerance unclamped 11/08/22 1500   Suction Setting/Drainage Method suction at the bedside 11/08/22 1500   Insertion Site Appearance no redness, warmth, tenderness, skin breakdown, drainage 11/08/22 1500   Drainage None 11/06/22 0701   Flush/Irrigation flushed w/;water;no resistance met 11/06/22 0701   Feeding Type continuous;by pump 11/08/22 0301   Feeding Action feeding continued 11/08/22 1500   Current Rate (mL/hr) 30 mL/hr 11/08/22 0700   Goal Rate (mL/hr) 40 mL/hr 11/08/22 0700   Intake (mL) 50 mL 11/08/22 1200   Water Bolus (mL) 30 mL 11/05/22 1001   Tube Output(mL)(Include Discarded Residual) 160 mL 11/05/22 1001   Formula Name impact peptide 11/08/22 1500   Intake (mL) - Formula Tube Feeding 55 11/08/22 1700   Residual Amount (ml) 0 ml 11/06/22 0701            Urethral Catheter 11/05/22 0630 Non-latex 16 Fr. (Active)   Site Assessment Clean;Intact 11/08/22 1500   Collection Container Urimeter 11/08/22 1500   Securement Method secured to top of thigh w/ adhesive device 11/08/22 1500   Catheter Care Performed yes 11/08/22 1500   Reason for Continuing Urinary Catheterization Critically ill in ICU and  "requiring hourly monitoring of intake/output 11/08/22 1500   CAUTI Prevention Bundle Securement Device in place with 1" slack;Intact seal between catheter & drainage tubing;Drainage bag/urimeter off the floor;Sheeting clip in use;No dependent loops or kinks;Drainage bag/urimeter not overfilled (<2/3 full);Drainage bag/urimeter below bladder 11/08/22 1100   Output (mL) 45 mL 11/08/22 1700       Physical Exam    Neurosurgery Physical Exam    E3VtM5  Sedated, intubated  Bsi  PERRL  L - loc/spon  RUE - min WD  RLE plegic    Significant Labs:  Recent Labs   Lab 11/07/22  0214 11/08/22  0215   * 148*    141   K 3.6 3.3*    110   CO2 22* 22*   BUN 8 7*   CREATININE 0.6 0.6   CALCIUM 8.1* 7.7*   MG 1.9 1.7     Recent Labs   Lab 11/07/22  0305 11/08/22 0215 11/08/22  0358   WBC 13.55* 9.77 9.91   HGB 7.3* 7.1* 7.6*   HCT 22.3* 21.8* 23.3*   * 143* 141*     Recent Labs   Lab 11/07/22 0305 11/08/22  0215   INR  --  1.2   APTT 30.0 30.8     Microbiology Results (last 7 days)       ** No results found for the last 168 hours. **          All pertinent labs from the last 24 hours have been reviewed.    Significant Diagnostics:  I have reviewed all pertinent imaging results/findings within the past 24 hours.  X-Ray Chest 1 View    Result Date: 11/8/2022  No significant change from prior study. Electronically signed by: Juan Antonio Ko MD Date:    11/08/2022 Time:    04:51     Assessment/Plan:     Left-sided nontraumatic intracerebral hemorrhage  76M h/o AFib on Eliquis,CAD, HTN, DM II, LAAO w/Amulet procedure on 11/04/2022, who presents as a transfer from [facility] s/p [facility] for evaluation of L MCA stroke w/ICH (ICH Score 1). OSH CTH with multifocal left ICH. CTA negative for vascular lesions. Patient was intubated at OSH. Cryo was ordered at the outside hospital but not given. On arrival to [facility], CTH repeat with occipital ICH component decompressing into occipital horn. No HCP. " Bleeds grossly stable.     --Patient admitted to [facility] on telemetry      -q1h neurochecks in ICU, q2h neurochecks in stepdown, q4h neurochecks on floor  --All labs and diagnostics reviewed  --MRI Brain w/wo to evaluate for underlying lesion on 11/5 showing stable hemorrhage; multifocal expansile T2/FLAIR hyperintense signal with associated restricted diffusion disproportionately affecting the posterior cerebral hemispheric cortex adjoining white matter (concerning for recent ischemia versus PRES), and restricted diffusion in R cerebellum (concerning for recent ischemia)  --CTH 11/6 showing persistent hemorrhage, stable on same day repeat  --Vasculitis workup per [facility]  --Further care per [facility]  --NSY will sign off. Please page with questions. Thank you for this interesting consult.     Plan d/w Dr. Cortez.     Dispo: per primary        Roe Rubalcava MD  Neurosurgery  [facility] - Neuro Critical Care

## 2022-11-09 NOTE — PLAN OF CARE
[facility] Care Plan    POC reviewed with Luis Sorto Jr. and family at 1400. Pt verbalized understanding. Questions and concerns addressed. No acute events today. Pt progressing toward goals. Will continue to monitor. See below and flowsheets for full assessment and VS info.     Respiratory advanced tube 3 cm to 25 @lip d/t xray imaging noting tube was at clavicle  Tube feed restarted pt at goal  Tylenol given x2   Suppository given resulted in BM   Ceftriaxone started   Labetalol given x1     Is this a stroke patient? yes- Stroke booklet reviewed with patient and family, risk factors identified for patient and stroke booklet remains at bedside for ongoing education.     Neuro:  [place] Coma Scale  Best Eye Response: 2-->(E2) to pain  Best Motor Response: 4-->(M4) withdraws from pain  Best Verbal Response: 1-->(V1) none  [place] Coma Scale Score: 7  Assessment Qualifiers: patient intubated  Pupil PERRLA: yes     24 hr Temp:  [100 °F (37.8 °C)-101.5 °F (38.6 °C)]     CV:   Rhythm: atrial rhythm  BP goals:   SBP < 140  MAP > 65    Resp:   O2 Device (Oxygen Therapy): ventilator  Vent Mode: Spont  Set Rate: 14 BPM  Oxygen Concentration (%): 30  Vt Set: 500 mL  PEEP/CPAP: 5 cmH20  Pressure Support: 8 cmH20    Plan: wean to extubate    GI/:     Diet/Nutrition Received: tube feeding  Last Bowel Movement: 11/04/22  Voiding Characteristics: urethral catheter (bladder)    Intake/Output Summary (Last 24 hours) at 11/9/2022 1609  Last data filed at 11/9/2022 1500  Gross per 24 hour   Intake 836.44 ml   Output 2180 ml   Net -1343.56 ml          Labs/Accuchecks:  Recent Labs   Lab 11/09/22  0338   WBC 8.97   RBC 2.72*   HGB 8.4*   HCT 25.6*         Recent Labs   Lab 11/09/22  0252      K 3.6   CO2 26      BUN 9   CREATININE 0.5   ALKPHOS 305*   ALT 15   AST 26   BILITOT 1.6*      Recent Labs   Lab 11/09/22  0252   INR 1.2   APTT 30.4      Recent Labs   Lab 11/05/22  0632 11/05/22  1201 11/06/22  0003      --   --    CPKMB 2.4  --   --    TROPONINI 0.141*   < > 0.053*   MB 2.1  --   --     < > = values in this interval not displayed.       Electrolytes: N/A - electrolytes WDL  Accuchecks: Q4H    Gtts:   dexmedetomidine (PRECEDEX) infusion Stopped (11/09/22 1231)    dextrose 10 % in water (D10W)         LDA/Wounds:  Lines/Drains/Airways       Drain  Duration                  NG/OG Tube 11/05/22 0333 orogastric 18 Fr. Center mouth 4 days         Urethral Catheter 11/05/22 0630 Non-latex 16 Fr. 4 days         Closed/Suction Drain 11/05/22 1741 Right;Anterior Groin Bulb 15 Fr. 3 days         Closed/Suction Drain 11/05/22 1742 Right;Anterior Hip Bulb 15 Fr. 3 days              Airway  Duration                  Airway - Non-Surgical 11/05/22 0328 4 days              Peripheral Intravenous Line  Duration                  Peripheral IV - Single Lumen 11/05/22 20 G Anterior;Right Upper Arm 4 days         Peripheral IV - Single Lumen 11/09/22 18 G Anterior;Distal;Left Upper Arm <1 day         Peripheral IV - Single Lumen 11/09/22 20 G Anterior;Left Forearm <1 day                  Wounds: Yes  Wound care consulted: Yes

## 2022-11-10 PROBLEM — E66.9 CLASS 2 OBESITY WITH BODY MASS INDEX (BMI) OF 37.0 TO 37.9 IN ADULT: Chronic | Status: ACTIVE | Noted: 2022-11-10

## 2022-11-10 LAB
ALBUMIN SERPL BCP-MCNC: 1.8 G/DL (ref 3.5–5.2)
ALLENS TEST: ABNORMAL
ALP SERPL-CCNC: 266 U/L (ref 55–135)
ALT SERPL W/O P-5'-P-CCNC: 29 U/L (ref 10–44)
ANION GAP SERPL CALC-SCNC: 10 MMOL/L (ref 8–16)
APTT BLDCRRT: 24.5 SEC (ref 21–32)
AST SERPL-CCNC: 38 U/L (ref 10–40)
BASOPHILS # BLD AUTO: 0.02 K/UL (ref 0–0.2)
BASOPHILS NFR BLD: 0.2 % (ref 0–1.9)
BILIRUB SERPL-MCNC: 1.6 MG/DL (ref 0.1–1)
BUN SERPL-MCNC: 10 MG/DL (ref 8–23)
CALCIUM SERPL-MCNC: 8.1 MG/DL (ref 8.7–10.5)
CHLORIDE SERPL-SCNC: 103 MMOL/L (ref 95–110)
CO2 SERPL-SCNC: 23 MMOL/L (ref 23–29)
CREAT SERPL-MCNC: 0.6 MG/DL (ref 0.5–1.4)
DELSYS: ABNORMAL
DIFFERENTIAL METHOD: ABNORMAL
EOSINOPHIL # BLD AUTO: 0.1 K/UL (ref 0–0.5)
EOSINOPHIL NFR BLD: 0.9 % (ref 0–8)
ERYTHROCYTE [DISTWIDTH] IN BLOOD BY AUTOMATED COUNT: 15.7 % (ref 11.5–14.5)
ERYTHROCYTE [SEDIMENTATION RATE] IN BLOOD BY WESTERGREN METHOD: 14 MM/H
EST. GFR  (NO RACE VARIABLE): >60 ML/MIN/1.73 M^2
FIO2: 30
GLUCOSE SERPL-MCNC: 199 MG/DL (ref 70–110)
HCO3 UR-SCNC: 29.7 MMOL/L (ref 24–28)
HCT VFR BLD AUTO: 28.1 % (ref 40–54)
HGB BLD-MCNC: 8.6 G/DL (ref 14–18)
IMM GRANULOCYTES # BLD AUTO: 0.12 K/UL (ref 0–0.04)
IMM GRANULOCYTES NFR BLD AUTO: 1.4 % (ref 0–0.5)
INR PPP: 1.2 (ref 0.8–1.2)
LYMPHOCYTES # BLD AUTO: 1 K/UL (ref 1–4.8)
LYMPHOCYTES NFR BLD: 11.3 % (ref 18–48)
MAGNESIUM SERPL-MCNC: 2 MG/DL (ref 1.6–2.6)
MCH RBC QN AUTO: 30.3 PG (ref 27–31)
MCHC RBC AUTO-ENTMCNC: 30.6 G/DL (ref 32–36)
MCV RBC AUTO: 99 FL (ref 82–98)
MODE: ABNORMAL
MONOCYTES # BLD AUTO: 0.9 K/UL (ref 0.3–1)
MONOCYTES NFR BLD: 9.9 % (ref 4–15)
NEUTROPHILS # BLD AUTO: 6.6 K/UL (ref 1.8–7.7)
NEUTROPHILS NFR BLD: 76.3 % (ref 38–73)
NRBC BLD-RTO: 0 /100 WBC
PCO2 BLDA: 39.9 MMHG (ref 35–45)
PEEP: 5
PH SMN: 7.48 [PH] (ref 7.35–7.45)
PHOSPHATE SERPL-MCNC: 3 MG/DL (ref 2.7–4.5)
PIP: 20
PLATELET # BLD AUTO: 217 K/UL (ref 150–450)
PMV BLD AUTO: 8.8 FL (ref 9.2–12.9)
PO2 BLDA: 37 MMHG (ref 40–60)
POC BE: 6 MMOL/L
POC SATURATED O2: 75 % (ref 95–100)
POC TCO2: 31 MMOL/L (ref 24–29)
POCT GLUCOSE: 169 MG/DL (ref 70–110)
POCT GLUCOSE: 175 MG/DL (ref 70–110)
POCT GLUCOSE: 186 MG/DL (ref 70–110)
POCT GLUCOSE: 193 MG/DL (ref 70–110)
POCT GLUCOSE: 193 MG/DL (ref 70–110)
POCT GLUCOSE: 218 MG/DL (ref 70–110)
POTASSIUM SERPL-SCNC: 4 MMOL/L (ref 3.5–5.1)
PROT SERPL-MCNC: 5.6 G/DL (ref 6–8.4)
PROTHROMBIN TIME: 12.8 SEC (ref 9–12.5)
PS: 5
RBC # BLD AUTO: 2.84 M/UL (ref 4.6–6.2)
SAMPLE: ABNORMAL
SITE: ABNORMAL
SODIUM SERPL-SCNC: 136 MMOL/L (ref 136–145)
SP02: 97
VT: 500
WBC # BLD AUTO: 8.62 K/UL (ref 3.9–12.7)

## 2022-11-10 PROCEDURE — 83735 ASSAY OF MAGNESIUM: CPT | Performed by: PHYSICIAN ASSISTANT

## 2022-11-10 PROCEDURE — 85730 THROMBOPLASTIN TIME PARTIAL: CPT | Performed by: PHYSICIAN ASSISTANT

## 2022-11-10 PROCEDURE — 80053 COMPREHEN METABOLIC PANEL: CPT | Performed by: PHYSICIAN ASSISTANT

## 2022-11-10 PROCEDURE — 82803 BLOOD GASES ANY COMBINATION: CPT

## 2022-11-10 PROCEDURE — 63600175 PHARM REV CODE 636 W HCPCS: Performed by: PSYCHIATRY & NEUROLOGY

## 2022-11-10 PROCEDURE — 94761 N-INVAS EAR/PLS OXIMETRY MLT: CPT

## 2022-11-10 PROCEDURE — 99900035 HC TECH TIME PER 15 MIN (STAT)

## 2022-11-10 PROCEDURE — 27000221 HC OXYGEN, UP TO 24 HOURS

## 2022-11-10 PROCEDURE — 20000000 HC ICU ROOM

## 2022-11-10 PROCEDURE — 25000003 PHARM REV CODE 250: Performed by: NURSE PRACTITIONER

## 2022-11-10 PROCEDURE — 25000003 PHARM REV CODE 250

## 2022-11-10 PROCEDURE — 87077 CULTURE AEROBIC IDENTIFY: CPT

## 2022-11-10 PROCEDURE — 94003 VENT MGMT INPAT SUBQ DAY: CPT

## 2022-11-10 PROCEDURE — 99900026 HC AIRWAY MAINTENANCE (STAT)

## 2022-11-10 PROCEDURE — 99291 PR CRITICAL CARE, E/M 30-74 MINUTES: ICD-10-PCS | Mod: ,,, | Performed by: PSYCHIATRY & NEUROLOGY

## 2022-11-10 PROCEDURE — 27200966 HC CLOSED SUCTION SYSTEM

## 2022-11-10 PROCEDURE — 99291 CRITICAL CARE FIRST HOUR: CPT | Mod: ,,, | Performed by: PSYCHIATRY & NEUROLOGY

## 2022-11-10 PROCEDURE — 63600175 PHARM REV CODE 636 W HCPCS

## 2022-11-10 PROCEDURE — 87186 SC STD MICRODIL/AGAR DIL: CPT

## 2022-11-10 PROCEDURE — 82800 BLOOD PH: CPT

## 2022-11-10 PROCEDURE — 85610 PROTHROMBIN TIME: CPT | Performed by: PHYSICIAN ASSISTANT

## 2022-11-10 PROCEDURE — 84100 ASSAY OF PHOSPHORUS: CPT | Performed by: PHYSICIAN ASSISTANT

## 2022-11-10 PROCEDURE — 85025 COMPLETE CBC W/AUTO DIFF WBC: CPT | Performed by: PHYSICIAN ASSISTANT

## 2022-11-10 PROCEDURE — 87205 SMEAR GRAM STAIN: CPT

## 2022-11-10 PROCEDURE — 87070 CULTURE OTHR SPECIMN AEROBIC: CPT

## 2022-11-10 PROCEDURE — A4216 STERILE WATER/SALINE, 10 ML: HCPCS | Performed by: PHYSICIAN ASSISTANT

## 2022-11-10 PROCEDURE — 25000003 PHARM REV CODE 250: Performed by: PHYSICIAN ASSISTANT

## 2022-11-10 PROCEDURE — 25000003 PHARM REV CODE 250: Performed by: PSYCHIATRY & NEUROLOGY

## 2022-11-10 RX ORDER — METOPROLOL TARTRATE 1 MG/ML
5 INJECTION, SOLUTION INTRAVENOUS EVERY 5 MIN PRN
Status: COMPLETED | OUTPATIENT
Start: 2022-11-10 | End: 2022-11-12

## 2022-11-10 RX ORDER — INSULIN ASPART 100 [IU]/ML
9 INJECTION, SOLUTION INTRAVENOUS; SUBCUTANEOUS
Status: DISCONTINUED | OUTPATIENT
Start: 2022-11-10 | End: 2022-11-12

## 2022-11-10 RX ORDER — HYDRALAZINE HYDROCHLORIDE 20 MG/ML
10 INJECTION INTRAMUSCULAR; INTRAVENOUS EVERY 6 HOURS PRN
Status: DISCONTINUED | OUTPATIENT
Start: 2022-11-10 | End: 2022-11-14

## 2022-11-10 RX ORDER — LISINOPRIL 20 MG/1
40 TABLET ORAL DAILY
Status: DISCONTINUED | OUTPATIENT
Start: 2022-11-11 | End: 2022-11-13

## 2022-11-10 RX ORDER — INSULIN ASPART 100 [IU]/ML
10 INJECTION, SOLUTION INTRAVENOUS; SUBCUTANEOUS
Status: CANCELLED | OUTPATIENT
Start: 2022-11-11

## 2022-11-10 RX ORDER — METOPROLOL TARTRATE 1 MG/ML
5 INJECTION, SOLUTION INTRAVENOUS ONCE
Status: COMPLETED | OUTPATIENT
Start: 2022-11-10 | End: 2022-11-10

## 2022-11-10 RX ORDER — MAGNESIUM SULFATE HEPTAHYDRATE 40 MG/ML
2 INJECTION, SOLUTION INTRAVENOUS ONCE
Status: COMPLETED | OUTPATIENT
Start: 2022-11-10 | End: 2022-11-10

## 2022-11-10 RX ORDER — HEPARIN SODIUM 5000 [USP'U]/ML
5000 INJECTION, SOLUTION INTRAVENOUS; SUBCUTANEOUS EVERY 8 HOURS
Status: DISCONTINUED | OUTPATIENT
Start: 2022-11-10 | End: 2022-11-14

## 2022-11-10 RX ORDER — LABETALOL HCL 20 MG/4 ML
10 SYRINGE (ML) INTRAVENOUS EVERY 4 HOURS PRN
Status: DISCONTINUED | OUTPATIENT
Start: 2022-11-10 | End: 2022-11-14

## 2022-11-10 RX ORDER — AMANTADINE HYDROCHLORIDE 50 MG/5ML
100 SOLUTION ORAL 2 TIMES DAILY
Status: DISCONTINUED | OUTPATIENT
Start: 2022-11-10 | End: 2022-11-14

## 2022-11-10 RX ORDER — LISINOPRIL 20 MG/1
20 TABLET ORAL ONCE
Status: COMPLETED | OUTPATIENT
Start: 2022-11-10 | End: 2022-11-10

## 2022-11-10 RX ADMIN — ATORVASTATIN CALCIUM 40 MG: 40 TABLET, FILM COATED ORAL at 09:11

## 2022-11-10 RX ADMIN — FAMOTIDINE 20 MG: 20 TABLET ORAL at 09:11

## 2022-11-10 RX ADMIN — Medication 3 ML: at 06:11

## 2022-11-10 RX ADMIN — INSULIN ASPART 2 UNITS: 100 INJECTION, SOLUTION INTRAVENOUS; SUBCUTANEOUS at 04:11

## 2022-11-10 RX ADMIN — INSULIN ASPART 9 UNITS: 100 INJECTION, SOLUTION INTRAVENOUS; SUBCUTANEOUS at 09:11

## 2022-11-10 RX ADMIN — METOPROLOL TARTRATE 50 MG: 50 TABLET, FILM COATED ORAL at 08:11

## 2022-11-10 RX ADMIN — INSULIN ASPART 1 UNITS: 100 INJECTION, SOLUTION INTRAVENOUS; SUBCUTANEOUS at 09:11

## 2022-11-10 RX ADMIN — MAGNESIUM SULFATE 2 G: 2 INJECTION INTRAVENOUS at 06:11

## 2022-11-10 RX ADMIN — INSULIN ASPART 4 UNITS: 100 INJECTION, SOLUTION INTRAVENOUS; SUBCUTANEOUS at 11:11

## 2022-11-10 RX ADMIN — MUPIROCIN: 20 OINTMENT TOPICAL at 08:11

## 2022-11-10 RX ADMIN — METOROPROLOL TARTRATE 5 MG: 5 INJECTION, SOLUTION INTRAVENOUS at 06:11

## 2022-11-10 RX ADMIN — HEPARIN SODIUM 5000 UNITS: 5000 INJECTION INTRAVENOUS; SUBCUTANEOUS at 02:11

## 2022-11-10 RX ADMIN — Medication: at 10:11

## 2022-11-10 RX ADMIN — LABETALOL HYDROCHLORIDE 10 MG: 5 INJECTION, SOLUTION INTRAVENOUS at 02:11

## 2022-11-10 RX ADMIN — INSULIN ASPART 8 UNITS: 100 INJECTION, SOLUTION INTRAVENOUS; SUBCUTANEOUS at 12:11

## 2022-11-10 RX ADMIN — CEFTRIAXONE 2 G: 2 INJECTION, SOLUTION INTRAVENOUS at 09:11

## 2022-11-10 RX ADMIN — METOPROLOL TARTRATE 50 MG: 50 TABLET, FILM COATED ORAL at 09:11

## 2022-11-10 RX ADMIN — AMANTADINE HYDROCHLORIDE 100 MG: 50 SOLUTION ORAL at 02:11

## 2022-11-10 RX ADMIN — ACETAMINOPHEN 650 MG: 325 TABLET ORAL at 05:11

## 2022-11-10 RX ADMIN — INSULIN ASPART 9 UNITS: 100 INJECTION, SOLUTION INTRAVENOUS; SUBCUTANEOUS at 04:11

## 2022-11-10 RX ADMIN — HYDRALAZINE HYDROCHLORIDE 10 MG: 20 INJECTION, SOLUTION INTRAMUSCULAR; INTRAVENOUS at 08:11

## 2022-11-10 RX ADMIN — LEVETIRACETAM 500 MG: 500 SOLUTION ORAL at 08:11

## 2022-11-10 RX ADMIN — Medication 3 ML: at 02:11

## 2022-11-10 RX ADMIN — MUPIROCIN: 20 OINTMENT TOPICAL at 10:11

## 2022-11-10 RX ADMIN — INSULIN ASPART 8 UNITS: 100 INJECTION, SOLUTION INTRAVENOUS; SUBCUTANEOUS at 03:11

## 2022-11-10 RX ADMIN — INSULIN ASPART 2 UNITS: 100 INJECTION, SOLUTION INTRAVENOUS; SUBCUTANEOUS at 03:11

## 2022-11-10 RX ADMIN — INSULIN ASPART 2 UNITS: 100 INJECTION, SOLUTION INTRAVENOUS; SUBCUTANEOUS at 07:11

## 2022-11-10 RX ADMIN — INSULIN ASPART 8 UNITS: 100 INJECTION, SOLUTION INTRAVENOUS; SUBCUTANEOUS at 07:11

## 2022-11-10 RX ADMIN — DEXMEDETOMIDINE HYDROCHLORIDE 0.1 MCG/KG/HR: 4 INJECTION, SOLUTION INTRAVENOUS at 09:11

## 2022-11-10 RX ADMIN — Medication 3 ML: at 09:11

## 2022-11-10 RX ADMIN — HEPARIN SODIUM 5000 UNITS: 5000 INJECTION INTRAVENOUS; SUBCUTANEOUS at 09:11

## 2022-11-10 RX ADMIN — INSULIN ASPART 1 UNITS: 100 INJECTION, SOLUTION INTRAVENOUS; SUBCUTANEOUS at 12:11

## 2022-11-10 RX ADMIN — LEVETIRACETAM 500 MG: 500 SOLUTION ORAL at 09:11

## 2022-11-10 RX ADMIN — INSULIN ASPART 8 UNITS: 100 INJECTION, SOLUTION INTRAVENOUS; SUBCUTANEOUS at 11:11

## 2022-11-10 RX ADMIN — HYDRALAZINE HYDROCHLORIDE 10 MG: 20 INJECTION, SOLUTION INTRAMUSCULAR; INTRAVENOUS at 05:11

## 2022-11-10 RX ADMIN — METOROPROLOL TARTRATE 5 MG: 5 INJECTION, SOLUTION INTRAVENOUS at 05:11

## 2022-11-10 RX ADMIN — LISINOPRIL 20 MG: 20 TABLET ORAL at 09:11

## 2022-11-10 RX ADMIN — Medication: at 08:11

## 2022-11-10 RX ADMIN — LISINOPRIL 20 MG: 20 TABLET ORAL at 12:11

## 2022-11-10 NOTE — PROGRESS NOTES
[facility]  Neurocritical Care  Progress Note    Admit Date: 11/5/2022  Service Date: 11/09/2022  Length of Stay: 4    Subjective:     Chief Complaint: Cerebrovascular accident (CVA) due to embolism of left middle cerebral artery    History of Present Illness: Mr. Luis Sorto Jr. is a 76 y.o. male with PMHx of AFib (on Eliquis),CAD, HTN, DM II, s/p LAAO w/Amulet procedure on 11/04/2022 who presents as a transfer from [facility] to Neuro Critical Care s/p [facility] for evaluation of L MCA stroke w/ICH. HPI information gathered from review of the patient's medical record due to the patient being intubated, no family at the bedside.  Patient was LKN around 2000.  He had been discharged home s/p LAAO w/Amulet device on 11/4/2022 around 1000 am.  He acutely developed confusion with difficulty walking and standing, difficulty conversing, and difficulty finding his bathroom.  There were no reported preceding symptoms and nothing was reported to exacerbate or alleviate the symptoms. He presented to [facility] ED where a CTH was obtained and was negative for acute findings.  Telestroke consult was performed by . TNKase was administered as the patient had been off of Eliquis >24h .  Of note, per the ED record patient was noted to have increased pain and swelling in the right groin in the area of the venous access from his Cardiology procedure and then the patient began to have bleeding and bruising in the anterior thigh on he right side. Patient became hemodynamically unstable and was started on Levophed. Outside Hospital Cardiology deparment placed an IV in the left groin for access. Patient was then stabilized. CT Head was ordered and showed Left ICH. Patient was intubated at outside hospital. Cryo was ordered at the outside hospital but not given. Patient was then transferred to [facility] Neuro [facility] for a higher level of care. On arrival to [facility] the  patient is intubated on fentanyl and levophed, NIH 22, hemodynamically unstable, with swelling to the Right groin, unresponsive, Cryo re-ordered and CT Head STAT. Patient will be admitted to Neuro ICU for a higher level of care.       Hospital Course: 11/06/2022: Patient is now s/p R pseudoaneurysm repair. Patient with slightly worsening ICH overnight, exam unchanged. Some oozing from the R groin site, however improved after holding pressure and applying a sandbag. Will consider FFP if continues to oozy from groin site. Patient off pressors, fluids increased. TYSON pending due to Echo findings and concern for possible thrombus.Tube feedings started.  11/07/2022: TYSON shows no thrombus and improved EF. Weaning sedation as tolerated. Discontinue venous femoral line. Restart tube feedings post TYSON.  11/08/2022: NAEON. Weaning sedation as tolerated. Transitioning from fentanyl gtt to precedex gtt. Oozing from cath site resolved. Plan for pressure support trial this afternoon.   11/09/2022: NAEON. Tolerated SBT. Ceftriaxone started for UTI.       Interval History:  NAEON. Tolerated SBT. ETT advanced. Intermittently febrile. Ceftriaxone started for UTI. TF restarted.     Review of Systems   Unable to perform ROS: Intubated     Objective:     Vitals:  Temp: 100.2 °F (37.9 °C)  Pulse: 103  Rhythm: atrial rhythm  BP: (!) 141/63  MAP (mmHg): 91  Resp: 10  SpO2: 97 %  Oxygen Concentration (%): 30  O2 Device (Oxygen Therapy): ventilator  Vent Mode: Spont  Set Rate: 14 BPM  Vt Set: 500 mL  Pressure Support: 8 cmH20  PEEP/CPAP: 5 cmH20  Peak Airway Pressure: 13 cmH2O  Mean Airway Pressure: 8.4 cmH20  Plateau Pressure: 0 cmH20    Temp  Min: 100 °F (37.8 °C)  Max: 101.5 °F (38.6 °C)  Pulse  Min: 83  Max: 124  BP  Min: 113/56  Max: 162/78  MAP (mmHg)  Min: 77  Max: 109  Resp  Min: 10  Max: 35  SpO2  Min: 93 %  Max: 99 %  Oxygen Concentration (%)  Min: 30  Max: 30    11/08 0701 - 11/09 0700  In: 1184.8 [I.V.:234.8]  Out: 2455  [Urine:2425; Drains:30]           Physical Exam  Vitals and nursing note reviewed.   Constitutional:       General: He is not in acute distress.     Appearance: Normal appearance. He is obese.      Interventions: He is sedated and intubated.      Comments: Well developed. Resting comfortably in bed.   HENT:      Head: Normocephalic and atraumatic.      Right Ear: External ear normal.      Left Ear: External ear normal.      Nose: Nose normal.      Mouth/Throat:      Mouth: Mucous membranes are moist.      Comments: ETT & OGT in place.   Eyes:      General: No scleral icterus.     Pupils: Pupils are equal, round, and reactive to light.   Cardiovascular:      Rate and Rhythm: Normal rate and regular rhythm.   Pulmonary:      Effort: No respiratory distress. He is intubated.      Comments: Intubated & mechanically ventilated.   Vent Mode: Spont  Oxygen Concentration (%):  [30] 30  Resp Rate Total:  [17 br/min-33 br/min] 27 br/min  Vt Set:  [500 mL] 500 mL  PEEP/CPAP:  [5 cmH20] 5 cmH20  Pressure Support:  [8 cmH20-10 cmH20] 8 cmH20  Mean Airway Pressure:  [7.9 lnB15-23 cmH20] 8.6 cmH20   Abdominal:      General: Abdomen is flat. There is no distension.      Palpations: Abdomen is soft.   Musculoskeletal:      Right lower leg: No edema.      Left lower leg: No edema.      Comments: R groin hematoma with bruising throughout extending into scrotum. Wound vac in place.   Skin:     General: Skin is warm and dry.   Neurological:      Comments: [id number]  Chemically sedated. Opens eyes to noxious stimuli. Does not track. Does not follow commands.   PERRL. No facial asymmetry. Cough, gag, & corneal reflexes intact.   RUE flexor posturing.  LUE moves spontaneously. BLE withdraws to noxious stimuli. Sensation intact to noxious stimuli.      Unable to test orientation, language, memory, judgment, insight, fund of knowledge, hearing, shoulder shrug, tongue protrusion, coordination, gait due to level of  consciousness.    Medications:  Continuousdexmedetomidine (PRECEDEX) infusion, Last Rate: Stopped (11/09/22 1231)  dextrose 10 % in water (D10W)  Scheduledatorvastatin, 40 mg, Daily  balsam peru-castor oiL, , BID  cefTRIAXone (ROCEPHIN) IVPB, 2 g, Q24H  famotidine, 20 mg, BID  insulin aspart U-100, 8 Units, 6 times per day  levetiracetam, 500 mg, BID  lisinopriL, 20 mg, Daily  metoprolol tartrate, 50 mg, BID  mupirocin, , BID  polyethylene glycol, 17 g, Daily  senna-docusate 8.6-50 mg, 2 tablet, BID  sodium chloride 0.9%, 3 mL, Q8H  PRNacetaminophen, 650 mg, Q6H PRN  bisacodyL, 10 mg, Daily PRN  dextrose 10 % in water (D10W), , Continuous PRN  dextrose 10%, 12.5 g, PRN  dextrose 10%, 25 g, PRN  fentaNYL, 25 mcg, Q1H PRN  glucagon (human recombinant), 1 mg, PRN  hydrALAZINE, 10 mg, Q6H PRN  insulin aspart U-100, 1-10 Units, Q4H PRN  labetalol, 10 mg, PRN  magnesium oxide, 800 mg, PRN  magnesium oxide, 800 mg, PRN  metoprolol, 5 mg, Q5 Min PRN  ondansetron, 4 mg, Q6H PRN  potassium bicarbonate, 35 mEq, PRN  potassium bicarbonate, 50 mEq, PRN  potassium bicarbonate, 60 mEq, PRN  potassium, sodium phosphates, 2 packet, PRN  potassium, sodium phosphates, 2 packet, PRN  potassium, sodium phosphates, 2 packet, PRN    Today I personally reviewed pertinent medications, lines/drains/airways, imaging, laboratory results, microbiology results, notably:    Laboratory:  CBC:  Recent Labs   Lab 11/09/22  0338   WBC 8.97   RBC 2.72*   HGB 8.4*   HCT 25.6*      MCV 94   MCH 30.9   MCHC 32.8         CMP:  Recent Labs   Lab 11/09/22  0252   CALCIUM 8.2*   PROT 5.5*      K 3.6   CO2 26      BUN 9   CREATININE 0.5   ALKPHOS 305*   ALT 15   AST 26   BILITOT 1.6*         Coagulation:  Recent Labs   Lab 11/09/22  0252   LABPROT 12.0   INR 1.2   APTT 30.4         ABG:  Recent Labs     11/09/22  0602   PH 7.516*   PCO2 35.6   PO2 78*   HCO3 28.8*   POCSATURATED 97   BE 6       Imaging:  X-Ray: X-Ray Chest 1  View  Impression:  ET tube tip C7-T1, suggest consider further insertion.  Pulmonary vascular congestion stable.  This report was flagged in Epic as abnormal.  Electronically signed by: Mundo Simmons MD  Date:                                            11/09/2022  Time:                                           09:05      Diet  Diet NPO  Diet NPO    Assessment/Plan:     Neuro  * Cerebrovascular accident (CVA) due to embolism of left middle cerebral artery  s/p Tenecteplase with new Left ICH   Holding ASA and subq heparin due to ICH    Left-sided nontraumatic intracerebral hemorrhage  Possible stroke s/p Tenecteplase with new Left ICH   -- AFib (on Eliquis)  -- s/p LAAO w/Amulet procedure on 11/04/2022  s/p TNKase  -- Continue Neuro checks q 1hr  -- Vascular Neurology consulted  -- Neurosurgery consulted  -- SBP goal <140 (Now with new Left ICH)  -- PT/OT/Speech  -- Atorvastatin 40 mg  -- Slight increase in size of bleed overnight 11/5, no change in exam  -- No surgical intervention at this time per NSGY team     Pulmonary  On mechanically assisted ventilation  Intubated at the outside hospital for airway protection  Weaning settings as tolerated, currently on minimal vent settings  Daily ABG, CXR  Continue famotidine, peridex  SBT daily    Vent Mode: Spont  Oxygen Concentration (%):  [30] 30  Resp Rate Total:  [17 br/min-33 br/min] 27 br/min  Vt Set:  [500 mL] 500 mL  PEEP/CPAP:  [5 cmH20] 5 cmH20  Pressure Support:  [8 cmH20-10 cmH20] 8 cmH20  Mean Airway Pressure:  [7.9 fdO23-54 cmH20] 8.6 cmH20    Cardiac/Vascular  Acute systolic heart failure  Echo shows:   The following segments are hypokinetic: mid inferoseptal, apical septal and apex. Small dyskinetic apical cap with no thrombus seen. All other segments are normal.   The estimated ejection fraction is 45%.   The left ventricle is normal in size with concentric remodeling and mildly decreased systolic function.   Left ventricular diastolic  dysfunction.   Normal right ventricular size with normal right ventricular systolic function.   Severe left atrial enlargement.   The estimated PA systolic pressure is 28 mmHg.   Normal central venous pressure (3 mmHg).    TYSON 11/7 shows no thrombus, improved EF of 55%    Pseudoaneurysm of right femoral artery  S/p repair 11/5  Vascular surgery following, appreciate recs  Oozing resolved, will continue to monitor    Hyperlipidemia  -- Lipid panel  -- Atorvastatin 40 mg daily    Persistent atrial fibrillation  -- AFib (on Eliquis), stopped prior to procedure LAAO w/Amulet procedure on 11/04/2022   -- 5mg IV Lopressor upon admission  -- Home metoprolol started    Renal/  Acute cystitis without hematuria  - Urine culture positive for Klebsiella  - Start ceftriaxone 2g qd x 7 days    Endocrine  Diabetes mellitus  Diabetes Mellitus Type II  -- Hyperglycemic in the 300s on admit  -- HgbA1c 6.1  -- Insulin gtt dc'd 11/8, transitioned to subq insulin with SSI  -- Diabetic tube feeds    GI  Oral phase dysphagia  Intubated  OGT in place for nutrition and meds  Cont tube feedings    Groin swelling  -- s/p LAAO w/Amulet procedure on 11/04/2022  s/p TNKase  -- Groin swelling continuous to increase in size  -- Sand bags and manual pressure applied   -- Vascular surgery consulted  -- See pseudoaneurysm          The patient is being Prophylaxed for:  Venous Thromboembolism with: Mechanical, hold chemical in setting of acute bleed  Stress Ulcer with: H2B  Ventilator Pneumonia with: chlorhexidine oral care    Activity Orders          Turn patient starting at 11/05 1258    Elevate HOB starting at 11/05 0623    Diet NPO: NPO starting at 11/05 0623        Full Code    Critical condition in that Patient has a condition that poses threat to life and bodily function: L MCA CVA s/p tenecteplase, ICH, vasogenic edema, brain compression,  R femoral pseudoaneurysm, intubated & mechanically ventilated, debility.      33 minutes of  critical care time was spent personally by me on the following activities: development of treatment plan with patient or surrogate and bedside caregivers, discussions with consultants, evaluation of patient's response to treatment, examination of patient, ordering and performing treatments and interventions, ordering and review of laboratory studies, ordering and review of radiographic studies, pulse oximetry, antibiotic titration, re-evaluation of patient's condition. This critical care time did not overlap with that of any other provider or involve time for any procedures. There is high probability for acute neurological change leading to clinical and possibly life-threatening deterioration requiring highest level of physician preparedness for urgent intervention.    Lizbet Evans PA-C  Neurocritical Care  [facility]

## 2022-11-10 NOTE — ASSESSMENT & PLAN NOTE
Intubated at the outside hospital for airway protection  Weaning settings as tolerated, currently on minimal vent settings  Daily ABG, CXR  Continue famotidine, peridex  SBT daily    Vent Mode: Spont  Oxygen Concentration (%):  [30] 30  Resp Rate Total:  [17 br/min-33 br/min] 27 br/min  Vt Set:  [500 mL] 500 mL  PEEP/CPAP:  [5 cmH20] 5 cmH20  Pressure Support:  [8 cmH20-10 cmH20] 8 cmH20  Mean Airway Pressure:  [7.9 yrH04-36 cmH20] 8.6 cmH20

## 2022-11-10 NOTE — ASSESSMENT & PLAN NOTE
Diabetes Mellitus Type II  -- Hyperglycemic in the 300s on admit  -- HgbA1c 6.1  -- Insulin gtt dc'd 11/8, transitioned to subq insulin with SSI  -- Diabetic tube feeds

## 2022-11-10 NOTE — SUBJECTIVE & OBJECTIVE
Interval History:  NAEON. Tolerated SBT. ETT advanced. Intermittently febrile. Ceftriaxone started for UTI. TF restarted.     Review of Systems   Unable to perform ROS: Intubated     Objective:     Vitals:  Temp: 100.2 °F (37.9 °C)  Pulse: 103  Rhythm: atrial rhythm  BP: (!) 141/63  MAP (mmHg): 91  Resp: 10  SpO2: 97 %  Oxygen Concentration (%): 30  O2 Device (Oxygen Therapy): ventilator  Vent Mode: Spont  Set Rate: 14 BPM  Vt Set: 500 mL  Pressure Support: 8 cmH20  PEEP/CPAP: 5 cmH20  Peak Airway Pressure: 13 cmH2O  Mean Airway Pressure: 8.4 cmH20  Plateau Pressure: 0 cmH20    Temp  Min: 100 °F (37.8 °C)  Max: 101.5 °F (38.6 °C)  Pulse  Min: 83  Max: 124  BP  Min: 113/56  Max: 162/78  MAP (mmHg)  Min: 77  Max: 109  Resp  Min: 10  Max: 35  SpO2  Min: 93 %  Max: 99 %  Oxygen Concentration (%)  Min: 30  Max: 30    11/08 0701 - 11/09 0700  In: 1184.8 [I.V.:234.8]  Out: 2455 [Urine:2425; Drains:30]           Physical Exam  Vitals and nursing note reviewed.   Constitutional:       General: He is not in acute distress.     Appearance: Normal appearance. He is obese.      Interventions: He is sedated and intubated.      Comments: Well developed. Resting comfortably in bed.   HENT:      Head: Normocephalic and atraumatic.      Right Ear: External ear normal.      Left Ear: External ear normal.      Nose: Nose normal.      Mouth/Throat:      Mouth: Mucous membranes are moist.      Comments: ETT & OGT in place.   Eyes:      General: No scleral icterus.     Pupils: Pupils are equal, round, and reactive to light.   Cardiovascular:      Rate and Rhythm: Normal rate and regular rhythm.   Pulmonary:      Effort: No respiratory distress. He is intubated.      Comments: Intubated & mechanically ventilated.   Vent Mode: Spont  Oxygen Concentration (%):  [30] 30  Resp Rate Total:  [17 br/min-33 br/min] 27 br/min  Vt Set:  [500 mL] 500 mL  PEEP/CPAP:  [5 cmH20] 5 cmH20  Pressure Support:  [8 cmH20-10 cmH20] 8 cmH20  Mean Airway  Pressure:  [7.9 zwT26-44 cmH20] 8.6 cmH20   Abdominal:      General: Abdomen is flat. There is no distension.      Palpations: Abdomen is soft.   Musculoskeletal:      Right lower leg: No edema.      Left lower leg: No edema.      Comments: R groin hematoma with bruising throughout extending into scrotum. Wound vac in place.   Skin:     General: Skin is warm and dry.   Neurological:      Comments: [id number]  Chemically sedated. Opens eyes to noxious stimuli. Does not track. Does not follow commands.   PERRL. No facial asymmetry. Cough, gag, & corneal reflexes intact.   RUE flexor posturing.  LUE moves spontaneously. BLE withdraws to noxious stimuli. Sensation intact to noxious stimuli.      Unable to test orientation, language, memory, judgment, insight, fund of knowledge, hearing, shoulder shrug, tongue protrusion, coordination, gait due to level of consciousness.    Medications:  Continuousdexmedetomidine (PRECEDEX) infusion, Last Rate: Stopped (11/09/22 1231)  dextrose 10 % in water (D10W)  Scheduledatorvastatin, 40 mg, Daily  balsam peru-castor oiL, , BID  cefTRIAXone (ROCEPHIN) IVPB, 2 g, Q24H  famotidine, 20 mg, BID  insulin aspart U-100, 8 Units, 6 times per day  levetiracetam, 500 mg, BID  lisinopriL, 20 mg, Daily  metoprolol tartrate, 50 mg, BID  mupirocin, , BID  polyethylene glycol, 17 g, Daily  senna-docusate 8.6-50 mg, 2 tablet, BID  sodium chloride 0.9%, 3 mL, Q8H  PRNacetaminophen, 650 mg, Q6H PRN  bisacodyL, 10 mg, Daily PRN  dextrose 10 % in water (D10W), , Continuous PRN  dextrose 10%, 12.5 g, PRN  dextrose 10%, 25 g, PRN  fentaNYL, 25 mcg, Q1H PRN  glucagon (human recombinant), 1 mg, PRN  hydrALAZINE, 10 mg, Q6H PRN  insulin aspart U-100, 1-10 Units, Q4H PRN  labetalol, 10 mg, PRN  magnesium oxide, 800 mg, PRN  magnesium oxide, 800 mg, PRN  metoprolol, 5 mg, Q5 Min PRN  ondansetron, 4 mg, Q6H PRN  potassium bicarbonate, 35 mEq, PRN  potassium bicarbonate, 50 mEq, PRN  potassium bicarbonate, 60 mEq,  PRN  potassium, sodium phosphates, 2 packet, PRN  potassium, sodium phosphates, 2 packet, PRN  potassium, sodium phosphates, 2 packet, PRN    Today I personally reviewed pertinent medications, lines/drains/airways, imaging, laboratory results, microbiology results, notably:    Laboratory:  CBC:  Recent Labs   Lab 11/09/22  0338   WBC 8.97   RBC 2.72*   HGB 8.4*   HCT 25.6*      MCV 94   MCH 30.9   MCHC 32.8         CMP:  Recent Labs   Lab 11/09/22  0252   CALCIUM 8.2*   PROT 5.5*      K 3.6   CO2 26      BUN 9   CREATININE 0.5   ALKPHOS 305*   ALT 15   AST 26   BILITOT 1.6*         Coagulation:  Recent Labs   Lab 11/09/22 0252   LABPROT 12.0   INR 1.2   APTT 30.4         ABG:  Recent Labs     11/09/22  0602   PH 7.516*   PCO2 35.6   PO2 78*   HCO3 28.8*   POCSATURATED 97   BE 6       Imaging:  X-Ray: X-Ray Chest 1 View  Impression:  ET tube tip C7-T1, suggest consider further insertion.  Pulmonary vascular congestion stable.  This report was flagged in Epic as abnormal.  Electronically signed by: Mundo Simmons MD  Date:                                            11/09/2022  Time:                                           09:05      Diet  Diet NPO  Diet NPO

## 2022-11-10 NOTE — CARE UPDATE
11/10/22 1447   PRE-TX-O2   Oxygen Concentration (%) 30   SpO2 99 %   Pulse (!) 126   Resp (!) 31   Temp (!) 100.9 °F (38.3 °C)        Airway - Non-Surgical 11/05/22 0328   Placement Date/Time: 11/05/22 0328   Method of Intubation: Glidescope;Bougie Intubation  Inserted by: MD  Staff/Resident Name(s): Dr. Walker  Airway Device Size: 8.0  Depth of Insertion (cm): 22  Secured at: Lips   Secured at (S)  27 cm  (Advanced ETT per respiratory communication)   Measured At Lips   Secured Location Right  (Moved to the right)   Secured by Commercial tube bradley   Bite Block right;secure and patent   Site Condition Cool;Dry   Status Intact;Secured;Patent   Site Assessment Clean;Dry;No bleeding;No drainage   Cuff Pressure 27 cm H2O   Vent Select   Charged w/in last 24h YES   Preset Conventional Ventilator Settings   Ventilation Type PC   Vent Mode Spont   PEEP/CPAP 5 cmH20   Pressure Support 8 cmH20   Insp Rise Time  70 %   I-Trigger Type  V-TRIG   Trigger Sensitivity Flow/I-Trigger 3 L/min   Patient Ventilator Parameters   Resp Rate Total 31 br/min   Peak Airway Pressure 14 cmH2O   Mean Airway Pressure 8.4 cmH20   Plateau Pressure 0 cmH20   Exhaled Vt 444 mL   Total Ve 16.6 L/m   Spont Ve 16.6 L   I:E Ratio Measured 1:1.40   Auto PEEP 0 cmH20   Inspired Tidal Volume (VTI) 477 mL   Conventional Ventilator Alarms   Ve High Alarm 24 L/min   Ve Low Alarm 2.5 L/min   Resp Rate High Alarm 45 br/min   Press High Alarm 60 cmH2O   Apnea Rate 16   Apnea Volume (mL) 440 mL   Apnea Oxygen Concentration  100   Apnea Flow Rate (L/min) 60   T Apnea 20 sec(s)   Ready to Wean/Extubation Screen   FIO2<=50 (chart decimal) 0.3   MV<16L (chart vol.) (!) 16.6   PEEP <=8 (chart #) 5   Ready to Wean Parameters   F/VT Ratio<105 (RSBI) (!) 69.82   Negative Inspiratory Force (cm H2O) 0   Vital Capacity (mL) 0

## 2022-11-10 NOTE — PLAN OF CARE
[facility] Care Plan    POC reviewed with Luis Sorto Jr. and family at 0300. Pt unable to verbalize understanding. Questions and concerns addressed son at bedside. No acute events overnight. Pt progressing toward goals. Will continue to monitor. See below and flowsheets for full assessment and VS info.           Is this a stroke patient? yes- Stroke booklet reviewed with patient and family, risk factors identified for patient and stroke booklet remains at bedside for ongoing education.     Neuro:  Dunia Coma Scale  Best Eye Response: 2-->(E2) to pain  Best Motor Response: 4-->(M4) withdraws from pain  Best Verbal Response: 1-->(V1) none  Dunia Coma Scale Score: 7  Assessment Qualifiers: patient intubated  Pupil PERRLA: yes     24hr Temp:  [100 °F (37.8 °C)-101.7 °F (38.7 °C)]     CV:   Rhythm: atrial rhythm  BP goals:   SBP < 140  MAP > 65    Resp:   O2 Device (Oxygen Therapy): ventilator  Vent Mode: SIMV  Set Rate: 14 BPM  Oxygen Concentration (%): 30  Vt Set: 500 mL  PEEP/CPAP: 5 cmH20  Pressure Support: 5 cmH20    Plan: wean to extubate    GI/:     Diet/Nutrition Received: tube feeding  Last Bowel Movement: 11/09/22  Voiding Characteristics: urethral catheter (bladder)    Intake/Output Summary (Last 24 hours) at 11/10/2022 0519  Last data filed at 11/10/2022 0501  Gross per 24 hour   Intake 1274.17 ml   Output 1975 ml   Net -700.83 ml          Labs/Accuchecks:  Recent Labs   Lab 11/10/22  0212   WBC 8.62   RBC 2.84*   HGB 8.6*   HCT 28.1*         Recent Labs   Lab 11/10/22  0212      K 4.0   CO2 23      BUN 10   CREATININE 0.6   ALKPHOS 266*   ALT 29   AST 38   BILITOT 1.6*      Recent Labs   Lab 11/10/22  0212   INR 1.2   APTT 24.5      Recent Labs   Lab 11/05/22  0632 11/05/22  1201 11/06/22  0003     --   --    CPKMB 2.4  --   --    TROPONINI 0.141*   < > 0.053*   MB 2.1  --   --     < > = values in this interval not displayed.       Electrolytes: N/A - electrolytes  WDL  Accuchecks: Q4H    Gtts:   dexmedetomidine (PRECEDEX) infusion Stopped (11/09/22 1231)    dextrose 10 % in water (D10W)         LDA/Wounds:  Lines/Drains/Airways       Drain  Duration                  NG/OG Tube 11/05/22 0333 orogastric 18 Fr. Center mouth 5 days         Closed/Suction Drain 11/05/22 1741 Right;Anterior Groin Bulb 15 Fr. 4 days         Closed/Suction Drain 11/05/22 1742 Right;Anterior Hip Bulb 15 Fr. 4 days         Urethral Catheter 11/05/22 0630 Non-latex 16 Fr. 4 days              Airway  Duration                  Airway - Non-Surgical 11/05/22 0328 5 days              Peripheral Intravenous Line  Duration                  Peripheral IV - Single Lumen 11/05/22 20 G Anterior;Right Upper Arm 5 days         Peripheral IV - Single Lumen 11/09/22 18 G Anterior;Distal;Left Upper Arm 1 day         Peripheral IV - Single Lumen 11/09/22 20 G Anterior;Left Forearm 1 day                  Wounds: Yes  Wound care consulted: Yes

## 2022-11-11 PROBLEM — T14.8XXA BRUISE: Status: ACTIVE | Noted: 2022-11-11

## 2022-11-11 LAB
ALBUMIN SERPL BCP-MCNC: 1.8 G/DL (ref 3.5–5.2)
ALLENS TEST: ABNORMAL
ALP SERPL-CCNC: 270 U/L (ref 55–135)
ALT SERPL W/O P-5'-P-CCNC: 60 U/L (ref 10–44)
ANION GAP SERPL CALC-SCNC: 12 MMOL/L (ref 8–16)
AST SERPL-CCNC: 80 U/L (ref 10–40)
BASOPHILS # BLD AUTO: 0.02 K/UL (ref 0–0.2)
BASOPHILS NFR BLD: 0.2 % (ref 0–1.9)
BILIRUB SERPL-MCNC: 1.4 MG/DL (ref 0.1–1)
BUN SERPL-MCNC: 14 MG/DL (ref 8–23)
CALCIUM SERPL-MCNC: 8.3 MG/DL (ref 8.7–10.5)
CHLORIDE SERPL-SCNC: 100 MMOL/L (ref 95–110)
CO2 SERPL-SCNC: 23 MMOL/L (ref 23–29)
CREAT SERPL-MCNC: 0.6 MG/DL (ref 0.5–1.4)
DELSYS: ABNORMAL
DIFFERENTIAL METHOD: ABNORMAL
EOSINOPHIL # BLD AUTO: 0.1 K/UL (ref 0–0.5)
EOSINOPHIL NFR BLD: 0.8 % (ref 0–8)
ERYTHROCYTE [DISTWIDTH] IN BLOOD BY AUTOMATED COUNT: 15.6 % (ref 11.5–14.5)
EST. GFR  (NO RACE VARIABLE): >60 ML/MIN/1.73 M^2
FIO2: 30
GLUCOSE SERPL-MCNC: 149 MG/DL (ref 70–110)
HCO3 UR-SCNC: 28.7 MMOL/L (ref 24–28)
HCT VFR BLD AUTO: 28.2 % (ref 40–54)
HGB BLD-MCNC: 9.1 G/DL (ref 14–18)
IMM GRANULOCYTES # BLD AUTO: 0.1 K/UL (ref 0–0.04)
IMM GRANULOCYTES NFR BLD AUTO: 1.1 % (ref 0–0.5)
LYMPHOCYTES # BLD AUTO: 1.2 K/UL (ref 1–4.8)
LYMPHOCYTES NFR BLD: 13.2 % (ref 18–48)
MAGNESIUM SERPL-MCNC: 2.3 MG/DL (ref 1.6–2.6)
MAP: 7.8
MCH RBC QN AUTO: 31.1 PG (ref 27–31)
MCHC RBC AUTO-ENTMCNC: 32.3 G/DL (ref 32–36)
MCV RBC AUTO: 96 FL (ref 82–98)
MIN VOL: 7.9
MODE: ABNORMAL
MONOCYTES # BLD AUTO: 0.9 K/UL (ref 0.3–1)
MONOCYTES NFR BLD: 10.4 % (ref 4–15)
NEUTROPHILS # BLD AUTO: 6.7 K/UL (ref 1.8–7.7)
NEUTROPHILS NFR BLD: 74.3 % (ref 38–73)
NRBC BLD-RTO: 1 /100 WBC
PCO2 BLDA: 35.5 MMHG (ref 35–45)
PEEP: 5
PH SMN: 7.52 [PH] (ref 7.35–7.45)
PHOSPHATE SERPL-MCNC: 2.7 MG/DL (ref 2.7–4.5)
PLATELET # BLD AUTO: 268 K/UL (ref 150–450)
PMV BLD AUTO: 9.1 FL (ref 9.2–12.9)
PO2 BLDA: 88 MMHG (ref 80–100)
POC BE: 6 MMOL/L
POC SATURATED O2: 98 % (ref 95–100)
POC TCO2: 30 MMOL/L (ref 23–27)
POCT GLUCOSE: 101 MG/DL (ref 70–110)
POCT GLUCOSE: 126 MG/DL (ref 70–110)
POCT GLUCOSE: 134 MG/DL (ref 70–110)
POCT GLUCOSE: 157 MG/DL (ref 70–110)
POCT GLUCOSE: 169 MG/DL (ref 70–110)
POCT GLUCOSE: 177 MG/DL (ref 70–110)
POTASSIUM SERPL-SCNC: 3.9 MMOL/L (ref 3.5–5.1)
PROT SERPL-MCNC: 5.9 G/DL (ref 6–8.4)
PS: 8
RBC # BLD AUTO: 2.93 M/UL (ref 4.6–6.2)
SAMPLE: ABNORMAL
SITE: ABNORMAL
SODIUM SERPL-SCNC: 135 MMOL/L (ref 136–145)
SPONT RATE: 24
VOL: 478
WBC # BLD AUTO: 9.07 K/UL (ref 3.9–12.7)

## 2022-11-11 PROCEDURE — 85025 COMPLETE CBC W/AUTO DIFF WBC: CPT | Performed by: PHYSICIAN ASSISTANT

## 2022-11-11 PROCEDURE — 25000003 PHARM REV CODE 250: Performed by: PHYSICIAN ASSISTANT

## 2022-11-11 PROCEDURE — 97112 NEUROMUSCULAR REEDUCATION: CPT

## 2022-11-11 PROCEDURE — 25000003 PHARM REV CODE 250: Performed by: PSYCHIATRY & NEUROLOGY

## 2022-11-11 PROCEDURE — 80053 COMPREHEN METABOLIC PANEL: CPT | Performed by: PHYSICIAN ASSISTANT

## 2022-11-11 PROCEDURE — 94761 N-INVAS EAR/PLS OXIMETRY MLT: CPT

## 2022-11-11 PROCEDURE — 27000221 HC OXYGEN, UP TO 24 HOURS

## 2022-11-11 PROCEDURE — 84100 ASSAY OF PHOSPHORUS: CPT | Performed by: PHYSICIAN ASSISTANT

## 2022-11-11 PROCEDURE — 63600175 PHARM REV CODE 636 W HCPCS: Performed by: NURSE PRACTITIONER

## 2022-11-11 PROCEDURE — 99291 CRITICAL CARE FIRST HOUR: CPT | Mod: ,,, | Performed by: PSYCHIATRY & NEUROLOGY

## 2022-11-11 PROCEDURE — 25000003 PHARM REV CODE 250

## 2022-11-11 PROCEDURE — 63600175 PHARM REV CODE 636 W HCPCS

## 2022-11-11 PROCEDURE — 27200966 HC CLOSED SUCTION SYSTEM

## 2022-11-11 PROCEDURE — 99223 1ST HOSP IP/OBS HIGH 75: CPT | Mod: ,,, | Performed by: NURSE PRACTITIONER

## 2022-11-11 PROCEDURE — 25000003 PHARM REV CODE 250: Performed by: NURSE PRACTITIONER

## 2022-11-11 PROCEDURE — 63600175 PHARM REV CODE 636 W HCPCS: Performed by: PSYCHIATRY & NEUROLOGY

## 2022-11-11 PROCEDURE — 36600 WITHDRAWAL OF ARTERIAL BLOOD: CPT

## 2022-11-11 PROCEDURE — 94003 VENT MGMT INPAT SUBQ DAY: CPT

## 2022-11-11 PROCEDURE — 99900026 HC AIRWAY MAINTENANCE (STAT)

## 2022-11-11 PROCEDURE — 99223 PR INITIAL HOSPITAL CARE,LEVL III: ICD-10-PCS | Mod: ,,, | Performed by: NURSE PRACTITIONER

## 2022-11-11 PROCEDURE — 99900035 HC TECH TIME PER 15 MIN (STAT)

## 2022-11-11 PROCEDURE — A4216 STERILE WATER/SALINE, 10 ML: HCPCS | Performed by: PHYSICIAN ASSISTANT

## 2022-11-11 PROCEDURE — 83735 ASSAY OF MAGNESIUM: CPT | Performed by: PHYSICIAN ASSISTANT

## 2022-11-11 PROCEDURE — 20000000 HC ICU ROOM

## 2022-11-11 PROCEDURE — 82800 BLOOD PH: CPT

## 2022-11-11 PROCEDURE — 99291 PR CRITICAL CARE, E/M 30-74 MINUTES: ICD-10-PCS | Mod: ,,, | Performed by: PSYCHIATRY & NEUROLOGY

## 2022-11-11 PROCEDURE — 82803 BLOOD GASES ANY COMBINATION: CPT

## 2022-11-11 RX ORDER — METOPROLOL TARTRATE 50 MG/1
50 TABLET ORAL EVERY 8 HOURS
Status: DISCONTINUED | OUTPATIENT
Start: 2022-11-11 | End: 2022-11-12

## 2022-11-11 RX ADMIN — AMANTADINE HYDROCHLORIDE 100 MG: 50 SOLUTION ORAL at 05:11

## 2022-11-11 RX ADMIN — HEPARIN SODIUM 5000 UNITS: 5000 INJECTION INTRAVENOUS; SUBCUTANEOUS at 09:11

## 2022-11-11 RX ADMIN — METOPROLOL TARTRATE 50 MG: 50 TABLET, FILM COATED ORAL at 09:11

## 2022-11-11 RX ADMIN — MUPIROCIN: 20 OINTMENT TOPICAL at 09:11

## 2022-11-11 RX ADMIN — FAMOTIDINE 20 MG: 20 TABLET ORAL at 09:11

## 2022-11-11 RX ADMIN — METOPROLOL TARTRATE 50 MG: 50 TABLET, FILM COATED ORAL at 01:11

## 2022-11-11 RX ADMIN — Medication: at 09:11

## 2022-11-11 RX ADMIN — INSULIN ASPART 9 UNITS: 100 INJECTION, SOLUTION INTRAVENOUS; SUBCUTANEOUS at 04:11

## 2022-11-11 RX ADMIN — ATORVASTATIN CALCIUM 40 MG: 40 TABLET, FILM COATED ORAL at 09:11

## 2022-11-11 RX ADMIN — INSULIN ASPART 9 UNITS: 100 INJECTION, SOLUTION INTRAVENOUS; SUBCUTANEOUS at 12:11

## 2022-11-11 RX ADMIN — LEVETIRACETAM 500 MG: 500 SOLUTION ORAL at 09:11

## 2022-11-11 RX ADMIN — AMANTADINE HYDROCHLORIDE 100 MG: 50 SOLUTION ORAL at 09:11

## 2022-11-11 RX ADMIN — INSULIN ASPART 2 UNITS: 100 INJECTION, SOLUTION INTRAVENOUS; SUBCUTANEOUS at 04:11

## 2022-11-11 RX ADMIN — LABETALOL HYDROCHLORIDE 10 MG: 5 INJECTION, SOLUTION INTRAVENOUS at 02:11

## 2022-11-11 RX ADMIN — PIPERACILLIN SODIUM AND TAZOBACTAM SODIUM 4.5 G: 4; .5 INJECTION, POWDER, LYOPHILIZED, FOR SOLUTION INTRAVENOUS at 09:11

## 2022-11-11 RX ADMIN — CEFTRIAXONE 2 G: 2 INJECTION, SOLUTION INTRAVENOUS at 09:11

## 2022-11-11 RX ADMIN — HEPARIN SODIUM 5000 UNITS: 5000 INJECTION INTRAVENOUS; SUBCUTANEOUS at 05:11

## 2022-11-11 RX ADMIN — INSULIN ASPART 2 UNITS: 100 INJECTION, SOLUTION INTRAVENOUS; SUBCUTANEOUS at 08:11

## 2022-11-11 RX ADMIN — ACETAMINOPHEN 650 MG: 325 TABLET ORAL at 04:11

## 2022-11-11 RX ADMIN — Medication 3 ML: at 05:11

## 2022-11-11 RX ADMIN — HEPARIN SODIUM 5000 UNITS: 5000 INJECTION INTRAVENOUS; SUBCUTANEOUS at 01:11

## 2022-11-11 RX ADMIN — FENTANYL CITRATE 25 MCG: 0.05 INJECTION, SOLUTION INTRAMUSCULAR; INTRAVENOUS at 02:11

## 2022-11-11 RX ADMIN — LISINOPRIL 40 MG: 20 TABLET ORAL at 09:11

## 2022-11-11 RX ADMIN — PIPERACILLIN SODIUM AND TAZOBACTAM SODIUM 4.5 G: 4; .5 INJECTION, POWDER, LYOPHILIZED, FOR SOLUTION INTRAVENOUS at 01:11

## 2022-11-11 RX ADMIN — Medication 3 ML: at 01:11

## 2022-11-11 RX ADMIN — INSULIN ASPART 1 UNITS: 100 INJECTION, SOLUTION INTRAVENOUS; SUBCUTANEOUS at 12:11

## 2022-11-11 RX ADMIN — ACETAMINOPHEN 650 MG: 325 TABLET ORAL at 09:11

## 2022-11-11 RX ADMIN — INSULIN ASPART 9 UNITS: 100 INJECTION, SOLUTION INTRAVENOUS; SUBCUTANEOUS at 08:11

## 2022-11-11 NOTE — ASSESSMENT & PLAN NOTE
-- AFib (on Eliquis), stopped prior to procedure LAAO w/Amulet procedure on 11/04/2022   -- 5mg IV Lopressor upon admission  -- Home metoprolol started  -- Goal K > 4, Mg > 2

## 2022-11-11 NOTE — PLAN OF CARE
[facility]  Discharge Reassessment    Primary Care Provider: Lui Santamaria MD    Expected Discharge Date: 11/17/2022    Per MD notes:  11/10/2022: NAEON. Amantadine started. GOC conversation had with son. Continuing SBT. Remains intermittently febrile with improving leukocytosis. Respiratory culture pending. US of the extremities pending to rule out DVT. ETT advanced.   11/11/2022: gram negative in resp cx- start Zosyn, updated patient's wife at bedside, GOC discussed today and Patient's wife will have a decision on Monday    Pt not medically stable to dc.     Reassessment (most recent)       Discharge Reassessment - 11/11/22 1432          Discharge Reassessment    Assessment Type Discharge Planning Reassessment     Did the patient's condition or plan change since previous assessment? No     Discharge Plan discussed with: Spouse/sig other     Name(s) and Number(s) Susan Sorto (wife) [phone]     Communicated MORRIS with patient/caregiver Date not available/Unable to determine     Discharge Plan A Long-term acute care facility (LTAC)     Discharge Plan B Skilled Nursing Facility     DME Needed Upon Discharge  other (see comments)   TBD    Discharge Barriers Identified None     Why the patient remains in the hospital Requires continued medical care        Post-Acute Status    Discharge Delays None known at this time                     Ros Figueroa LCSW  Neurocritical Care   [facility]  [id number]

## 2022-11-11 NOTE — PLAN OF CARE
Problem: Bowel Elimination Impaired (Stroke, Ischemic/Transient Ischemic Attack)  Goal: Effective Bowel Elimination  11/11/2022 0548 by Mary Adkins RN  Outcome: Ongoing, Progressing  11/11/2022 0546 by Mary Adkins RN  Outcome: Ongoing, Progressing

## 2022-11-11 NOTE — ASSESSMENT & PLAN NOTE
stroke s/p Tenecteplase with new Left ICH   -- AFib (on Eliquis)  -- s/p LAAO w/Amulet procedure on 11/04/2022 s/p TNKase  -- Continue Neuro checks q 1hr  -- Vascular Neurology consulted  -- Neurosurgery consulted  -- SBP goal <160   -- PT/OT/Speech  -- Atorvastatin 40 mg  -- Slight increase in size of bleed overnight 11/5, no change in exam  -- No surgical intervention at this time per NSGY team   11/11/2022: goals of care discussion today with patient's wife at bedside.   Patient's wife stated she will have an answer on Monday

## 2022-11-11 NOTE — SUBJECTIVE & OBJECTIVE
Interval History: NAEON. Tolerated SBT until midnight, then placed back on rate. Continuing SBT today. Precedex intermittently on overnight. Amantadine started. GOC conversation had with son. Remains intermittently febrile with improving leukocytosis. Respiratory culture pending. US of the extremities pending to rule out DVT. ETT advanced.     Review of Systems   Unable to perform ROS: Intubated     Objective:     Vitals:  Temp: (!) 101.7 °F (38.7 °C)  Pulse: (!) 113  Rhythm: (P) atrial rhythm  BP: (!) 145/71  MAP (mmHg): 100  Resp: 18  SpO2: 97 %  Oxygen Concentration (%): 30  O2 Device (Oxygen Therapy): ventilator  Vent Mode: Spont  Pressure Support: 8 cmH20  PEEP/CPAP: 5 cmH20  Peak Airway Pressure: 14 cmH2O  Mean Airway Pressure: 8.2 cmH20  Plateau Pressure: 0 cmH20    Temp  Min: 98.3 °F (36.8 °C)  Max: 101.7 °F (38.7 °C)  Pulse  Min: 101  Max: 130  BP  Min: 103/55  Max: 185/101  MAP (mmHg)  Min: 76  Max: 125  Resp  Min: 18  Max: 34  SpO2  Min: 94 %  Max: 100 %  Oxygen Concentration (%)  Min: 30  Max: 30    11/09 0701 - 11/10 0700  In: 1256.1 [I.V.:6.1]  Out: 1825 [Urine:1815; Drains:10]           Physical Exam  Vitals and nursing note reviewed.   Constitutional:       General: He is not in acute distress.     Appearance: Normal appearance. He is obese.      Interventions: He is sedated and intubated.      Comments: Well developed. Resting comfortably in bed.   HENT:      Head: Normocephalic and atraumatic.      Right Ear: External ear normal.      Left Ear: External ear normal.      Nose: Nose normal.      Mouth/Throat:      Mouth: Mucous membranes are moist.      Comments: ETT & OGT in place.   Eyes:      General: No scleral icterus.     Pupils: Pupils are equal, round, and reactive to light.   Cardiovascular:      Rate and Rhythm: Regular rhythm. Tachycardia present.   Pulmonary:      Effort: No respiratory distress. He is intubated.      Comments: Intubated & mechanically ventilated.   Vent Mode:  Spont  Oxygen Concentration (%):  [30] 30  Resp Rate Total:  [18 br/min-35 br/min] 30 br/min  Vt Set:  [500 mL] 500 mL  PEEP/CPAP:  [5 cmH20] 5 cmH20  Pressure Support:  [5 cmH20-8 cmH20] 8 cmH20  Mean Airway Pressure:  [8.2 cmH20-9.5 cmH20] 8.2 cmH20   Abdominal:      General: Abdomen is flat. There is no distension.      Palpations: Abdomen is soft.   Musculoskeletal:      Right lower leg: No edema.      Left lower leg: No edema.      Comments: R groin hematoma with bruising throughout extending into scrotum. 2 NEHA drains in place with scant drainage. Wound vac in place.   Skin:     General: Skin is warm and dry.   Neurological:      Comments: F3T2hR2  Not on sedation. Minimally opens eyes to noxious stimuli. Does not track. Does not follow commends.     PERRL. No facial asymmetry. Cough, gag, & corneal reflexes intact (right weaker than left).   RUE flexor posturing.  LUE moves spontaneously. BLE withdraws to noxious stimuli. Sensation intact to noxious stimuli.      Unable to test orientation, language, memory, judgment, insight, fund of knowledge, hearing, shoulder shrug, tongue protrusion, coordination, gait due to level of consciousness.    Medications:  Continuousdexmedetomidine (PRECEDEX) infusion, Last Rate: 0.1 mcg/kg/hr (11/10/22 2136)  dextrose 10 % in water (D10W)  Scheduledamantadine HCL, 100 mg, BID  atorvastatin, 40 mg, Daily  balsam peru-castor oiL, , BID  cefTRIAXone (ROCEPHIN) IVPB, 2 g, Q24H  famotidine, 20 mg, BID  heparin (porcine), 5,000 Units, Q8H  insulin aspart U-100, 9 Units, 6 times per day  levetiracetam, 500 mg, BID  [START ON 11/11/2022] lisinopriL, 40 mg, Daily  metoprolol tartrate, 50 mg, BID  mupirocin, , BID  polyethylene glycol, 17 g, Daily  senna-docusate 8.6-50 mg, 2 tablet, BID  sodium chloride 0.9%, 3 mL, Q8H  PRNacetaminophen, 650 mg, Q6H PRN  bisacodyL, 10 mg, Daily PRN  dextrose 10 % in water (D10W), , Continuous PRN  dextrose 10%, 12.5 g, PRN  dextrose 10%, 25 g,  PRN  fentaNYL, 25 mcg, Q1H PRN  glucagon (human recombinant), 1 mg, PRN  hydrALAZINE, 10 mg, Q6H PRN  insulin aspart U-100, 1-10 Units, Q4H PRN  labetalol, 10 mg, Q4H PRN  magnesium oxide, 800 mg, PRN  magnesium oxide, 800 mg, PRN  metoprolol, 5 mg, Q5 Min PRN  ondansetron, 4 mg, Q6H PRN  potassium bicarbonate, 35 mEq, PRN  potassium bicarbonate, 50 mEq, PRN  potassium bicarbonate, 60 mEq, PRN  potassium, sodium phosphates, 2 packet, PRN  potassium, sodium phosphates, 2 packet, PRN  potassium, sodium phosphates, 2 packet, PRN    Today I personally reviewed pertinent medications, lines/drains/airways, imaging, laboratory results, microbiology results, notably:    Laboratory:  CBC:  Recent Labs   Lab 11/10/22  0212   WBC 8.62   RBC 2.84*   HGB 8.6*   HCT 28.1*      MCV 99*   MCH 30.3   MCHC 30.6*       CMP:  Recent Labs   Lab 11/10/22  0212   CALCIUM 8.1*   PROT 5.6*      K 4.0   CO2 23      BUN 10   CREATININE 0.6   ALKPHOS 266*   ALT 29   AST 38   BILITOT 1.6*       Coagulation:  Recent Labs   Lab 11/10/22  0212   LABPROT 12.8*   INR 1.2   APTT 24.5       ABG:  Recent Labs     11/10/22  0404   PH 7.479*   PCO2 39.9   PO2 37*   HCO3 29.7*   POCSATURATED 75*   BE 6     Imaging:  X-Ray Chest AP Portable:  AP portable chest radiographic examination is submitted.  ET tube is noted, it has been advanced, now just above the level of the ale.  Suspected enteric tube is noted, appearing to extend subdiaphragmatic.  Additional objects overlie the patient.     There is diminished depth of inspiration and the patient is rotated, the entirety of the left costophrenic angle is not included on the field of view.  The cardiomediastinal silhouette appears stable when accounting for the aforementioned.     Accentuation of pulmonary bronchovascular markings in part due to diminished depth of inspiration noted.  There is appearance that may relate to superimposed interstitial and mild patchy alveolar infiltrate,  evaluation is limited on this exam.     Noting that the left costophrenic angle is not completely imaged otherwise there is no evidence for significant pleural effusion and there is no evidence for pneumothorax.  The osseous structures demonstrate chronic change.    Electronically signed by: Joesph Christie  Date:                                            11/10/2022  Time:                                           21:35        Microbiology:  Microbiology Results (last 7 days)       Procedure Component Value Units Date/Time    Culture, Respiratory with Gram Stain [[id number]] Collected: 11/10/22 1139    Order Status: Completed Specimen: Respiratory from Endotracheal Aspirate Updated: 11/10/22 1428     Gram Stain (Respiratory) Rare Gram negative rods     Gram Stain (Respiratory) Moderate WBC's             Diet  Diet NPO  Diet NPO

## 2022-11-11 NOTE — ASSESSMENT & PLAN NOTE
Diabetes Mellitus Type II  -- Hyperglycemic in the 300s on admit  -- HgbA1c 6.1  -- Insulin gtt dc'd 11/8, transitioned to subq insulin with SSI  -- Scheduled aspart increased to 9u q4h   -- SSI  -- Accuchecks  -- Diabetic tube feeds

## 2022-11-11 NOTE — ASSESSMENT & PLAN NOTE
Possible stroke s/p Tenecteplase with new Left ICH   -- AFib (on Eliquis)  -- s/p LAAO w/Amulet procedure on 11/04/2022 s/p TNKase  -- Continue Neuro checks q 1hr  -- Vascular Neurology consulted  -- Neurosurgery consulted  -- SBP goal <160   -- PT/OT/Speech  -- Atorvastatin 40 mg  -- Slight increase in size of bleed overnight 11/5, no change in exam  -- No surgical intervention at this time per NSGY team

## 2022-11-11 NOTE — SUBJECTIVE & OBJECTIVE
Review of Systems  Unable to obtain a complete ROS due to level of consciousness.  Objective:     Vitals:  Temp: 100.3 °F (37.9 °C)  Pulse: 101  Rhythm: atrial rhythm  BP: (!) 142/81  MAP (mmHg): 106  Resp: (!) 22  SpO2: 98 %  Oxygen Concentration (%): 30  O2 Device (Oxygen Therapy): ventilator  Vent Mode: SIMV  Set Rate: 14 BPM  Vt Set: 500 mL  Pressure Support: 5 cmH20  PEEP/CPAP: 5 cmH20  Peak Airway Pressure: 10 cmH2O  Mean Airway Pressure: 9.1 cmH20  Plateau Pressure: 0 cmH20    Temp  Min: 100.1 °F (37.8 °C)  Max: 102.2 °F (39 °C)  Pulse  Min: 93  Max: 129  BP  Min: 129/77  Max: 192/74  MAP (mmHg)  Min: 87  Max: 122  Resp  Min: 15  Max: 38  SpO2  Min: 92 %  Max: 99 %  Oxygen Concentration (%)  Min: 30  Max: 30    11/10 0701 - 11/11 0700  In: 1437 [I.V.:67.7]  Out: 2250 [Urine:2250]           Physical Exam  GA:  comfortable, no acute distress.   HEENT: No scleral icterus or JVD.   Pulmonary: Diminished Bibasilar to auscultation A/L.   Cardiac: RRR S1 & S2 w/o rubs/murmurs/gallops.   Abdominal: Bowel sounds present x 4. No appreciable hepatosplenomegaly.  Skin: No jaundice, rashes, or visible lesions.  Neuro:  --GCS: E2 Vt1 M5  --Mental Status:  opens eyes to pain, doesn't follow commands  --CN II-XII grossly intact.   --Pupils 3mm, PERRL.   --Corneal reflex, gag, cough intact.  --LUE withdraws  --RUE  no movement to pain  --BLE withdraws to pain  Medications:  Continuousdexmedetomidine (PRECEDEX) infusion, Last Rate: Stopped (11/11/22 0730)  dextrose 10 % in water (D10W)    Scheduledamantadine HCL, 100 mg, BID  atorvastatin, 40 mg, Daily  balsam peru-castor oiL, , BID  famotidine, 20 mg, BID  heparin (porcine), 5,000 Units, Q8H  insulin aspart U-100, 9 Units, 6 times per day  levetiracetam, 500 mg, BID  lisinopriL, 40 mg, Daily  metoprolol tartrate, 50 mg, Q8H  mupirocin, , BID  piperacillin-tazobactam (ZOSYN) IVPB, 4.5 g, Q8H  polyethylene glycol, 17 g, Daily  senna-docusate 8.6-50 mg, 2 tablet, BID  sodium  chloride 0.9%, 3 mL, Q8H    PRNacetaminophen, 650 mg, Q6H PRN  bisacodyL, 10 mg, Daily PRN  dextrose 10 % in water (D10W), , Continuous PRN  dextrose 10%, 12.5 g, PRN  dextrose 10%, 25 g, PRN  fentaNYL, 25 mcg, Q1H PRN  glucagon (human recombinant), 1 mg, PRN  hydrALAZINE, 10 mg, Q6H PRN  insulin aspart U-100, 1-10 Units, Q4H PRN  labetalol, 10 mg, Q4H PRN  magnesium oxide, 800 mg, PRN  magnesium oxide, 800 mg, PRN  metoprolol, 5 mg, Q5 Min PRN  ondansetron, 4 mg, Q6H PRN  potassium bicarbonate, 35 mEq, PRN  potassium bicarbonate, 50 mEq, PRN  potassium bicarbonate, 60 mEq, PRN  potassium, sodium phosphates, 2 packet, PRN  potassium, sodium phosphates, 2 packet, PRN  potassium, sodium phosphates, 2 packet, PRN      Today I personally reviewed pertinent medications, lines/drains/airways, imaging, cardiology results, laboratory results, microbiology results,     Diet  Diet NPO

## 2022-11-11 NOTE — CONSULTS
[facility] Neuro Critical Care  Skin Integrity JUMANA  Consult Note    Patient Name: Luis Sorto Jr.  MRN: [id number]  Admission Date: 11/5/2022  Hospital Length of Stay: 6 days  Attending Physician: Rebecca Alvarez MD  Primary Care Provider: uLi Santamaria MD     Consults  Subjective:     History of Present Illness:  Mr. Luis Sorto Jr. is a 76 year old male with PMHx of AFib (on Eliquis),CAD, HTN, DM II, s/p LAAO w/Amulet procedure on 11/04/2022 who presents as a transfer from [facility] to Neuro Critical Care s/p formerly [facility] for evaluation of L MCA stroke w/ICH. HPI information gathered from review of the patient's medical record due to the patient being intubated, no family at the bedside.  Patient was LKN around 2000.  He had been discharged home s/p LAAO w/Amulet device on 11/4/2022 around 1000 am.  He acutely developed confusion with difficulty walking and standing, difficulty conversing, and difficulty finding his bathroom.  There were no reported preceding symptoms and nothing was reported to exacerbate or alleviate the symptoms. He presented to [facility] ED where a CTH was obtained and was negative for acute findings.  Telestroke consult was performed by . TNKase was administered as the patient had been off of Eliquis >24h .  Of note, per the ED record patient was noted to have increased pain and swelling in the right groin in the area of the venous access from his Cardiology procedure and then the patient began to have bleeding and bruising in the anterior thigh on he right side. Patient became hemodynamically unstable and was started on Levophed. Outside Hospital Cardiology deparment placed an IV in the left groin for access. Patient was then stabilized. CT Head was ordered and showed Left ICH. Patient was intubated at outside hospital. Cryo was ordered at the outside hospital but not given. Patient was then transferred to [facility] Neuro [facility] for  a higher level of care. On arrival to [facility] the patient is intubated on fentanyl and levophed, NIH 22, hemodynamically unstable, with swelling to the Right groin, unresponsive, Cryo re-ordered and CT Head STAT. Patient will be admitted to Neuro ICU for a higher level of care. Skin integrity JUMANA consulted for evaluation of skin injury.      Scheduled Meds:   amantadine HCL  100 mg Per NG tube BID    atorvastatin  40 mg Per OG tube Daily    balsam peru-castor oiL   Topical (Top) BID    famotidine  20 mg Per OG tube BID    heparin (porcine)  5,000 Units Subcutaneous Q8H    insulin aspart U-100  9 Units Subcutaneous 6 times per day    levetiracetam  500 mg Per OG tube BID    lisinopriL  40 mg Per OG tube Daily    metoprolol tartrate  50 mg Per OG tube Q8H    mupirocin   Nasal BID    piperacillin-tazobactam (ZOSYN) IVPB  4.5 g Intravenous Q8H    polyethylene glycol  17 g Per NG tube Daily    senna-docusate 8.6-50 mg  2 tablet Per OG tube BID    sodium chloride 0.9%  3 mL Intravenous Q8H     Continuous Infusions:   dexmedetomidine (PRECEDEX) infusion Stopped (11/11/22 0730)    dextrose 10 % in water (D10W)       PRN Meds:acetaminophen, bisacodyL, dextrose 10 % in water (D10W), dextrose 10%, dextrose 10%, fentaNYL, glucagon (human recombinant), hydrALAZINE, insulin aspart U-100, labetalol, magnesium oxide, magnesium oxide, metoprolol, ondansetron, potassium bicarbonate, potassium bicarbonate, potassium bicarbonate, potassium, sodium phosphates, potassium, sodium phosphates, potassium, sodium phosphates    Review of patient's allergies indicates:  No Known Allergies     Past Medical History:   Diagnosis Date    A-fib     Anticoagulant long-term use     Coronary artery disease     Diabetes mellitus     no meds    Digestive disorder     Hypertension     Paroxysmal atrial fibrillation     Renal disorder     kidney stone    Sleep apnea     c pap machine used    Stroke     tia    Unspecified glaucoma       Past Surgical History:   Procedure Laterality Date    CARDIOVERSION      CYSTOSCOPY      EXCLUSION OF LEFT ATRIAL APPENDAGE N/A 11/4/2022    Procedure: EXCLUSION, LEFT ATRIAL APPENDAGE;  Surgeon: Dallas Gold MD;  Location: [facility] CATH;  Service: Cardiology;  Laterality: N/A;    EYE SURGERY      JOINT REPLACEMENT Bilateral     REPAIR, PSEUDOANEURYSM, ARTERY, FEMORAL Right 11/5/2022    Procedure: REPAIR, PSEUDOANEURYSM, ARTERY, FEMORAL;  Surgeon: Simba Turner MD;  Location: [facility] OR [address] [place] [address];  Service: Vascular;  Laterality: Right;    TRANSESOPHAGEAL ECHOCARDIOGRAPHY N/A 11/3/2022    Procedure: ECHOCARDIOGRAM, TRANSESOPHAGEAL;  Surgeon: Ahmet Bowers MD;  Location: [facility] CATH;  Service: Cardiology;  Laterality: N/A;    TRANSESOPHAGEAL ECHOCARDIOGRAPHY N/A 11/4/2022    Procedure: ECHOCARDIOGRAM, TRANSESOPHAGEAL;  Surgeon: Noel Carpenter MD;  Location: [facility] CATH;  Service: Cardiovascular;  Laterality: N/A;       Family History       Problem Relation (Age of Onset)    Arthritis Mother    Diabetes Mother    Heart disease Mother          Tobacco Use    Smoking status: Never    Smokeless tobacco: Not on file   Substance and Sexual Activity    Alcohol use: No    Drug use: No    Sexual activity: Never     Review of Systems   Skin:  Positive for wound.     Objective:     Vital Signs (Most Recent):  Temp: 100.3 °F (37.9 °C) (11/11/22 1101)  Pulse: 101 (11/11/22 1401)  Resp: (!) 22 (11/11/22 1401)  BP: (!) 142/81 (11/11/22 1401)  SpO2: 98 % (11/11/22 1401)   Vital Signs (24h Range):  Temp:  [100.1 °F (37.8 °C)-102.2 °F (39 °C)] 100.3 °F (37.9 °C)  Pulse:  [] 101  Resp:  [15-38] 22  SpO2:  [92 %-99 %] 98 %  BP: (129-192)/(62-88) 142/81     Weight: 116.6 kg (257 lb)  Body mass index is 37.95 kg/m².  Physical Exam  Constitutional:       Appearance: Normal appearance.   Skin:     General: Skin is warm and dry.      Findings: Lesion present.   Neurological:      Mental Status: He is alert.        Laboratory:  All pertinent labs reviewed within the last 24 hours.    Diagnostic Results:  None        Assessment/Plan:          JUMANA Skin Integrity Evaluation    Skin Integrity JUMANA evaluation of patient as part of the comprehensive skin care team.   He has been admitted for 4 days. Skin injury was noted on 11/8/22. POA yes.    Thoracic            Bruise  - consult received for evaluation of skin injury.  - presents as a transfer from [facility] to Neuro Critical Care s/p [facility] for evaluation of L MCA stroke w/ICH.  - purple bruise noted to thoracic region of back. No open wounds or skin breakdown noted.  - sacral region without injury. Sacral foam intact.  - pt is incontinent of stool and immobile increasing his injury of skin breakdown.  - BPCO ordered bid/prn to thoracic region.  - lulu surface utilized.  - RN at bedside and assisted with positioning.  - tolerating TFs.  - heel boots for offloading.  - strict q2h turns.  - nursing to maintain pressure injury prevention measures and continue wound care per orders.         Thank you for your consult. I will sign off. Please contact us if you have any additional questions.       Melody Vizcaino NP  Skin Integrity JUMANA  [facility]

## 2022-11-11 NOTE — ASSESSMENT & PLAN NOTE
Intubated at the outside hospital for airway protection  Weaning settings as tolerated, currently on minimal vent settings  Daily ABG, CXR  Continue famotidine, peridex  SBT daily  Vent Mode: Spont  Oxygen Concentration (%):  [30] 30  Resp Rate Total:  [18 br/min-35 br/min] 30 br/min  Vt Set:  [500 mL] 500 mL  PEEP/CPAP:  [5 cmH20] 5 cmH20  Pressure Support:  [5 cmH20-8 cmH20] 8 cmH20  Mean Airway Pressure:  [8.2 cmH20-9.5 cmH20] 8.2 cmH20

## 2022-11-11 NOTE — ASSESSMENT & PLAN NOTE
-- AFib (on Eliquis), stopped prior to procedure LAAO w/Amulet procedure on 11/04/2022   -- 5mg IV Lopressor upon admission  -- Home metoprolol started  -- Goal K > 4, Mg > 2   11/11/2022: metoprolol increased to q8 hours

## 2022-11-11 NOTE — HPI
Mr. Luis Sorto Jr. is a 76 year old male with PMHx of AFib (on Eliquis),CAD, HTN, DM II, s/p LAAO w/Amulet procedure on 11/04/2022 who presents as a transfer from [facility] to Neuro Critical Care s/p [facility] for evaluation of L MCA stroke w/ICH. HPI information gathered from review of the patient's medical record due to the patient being intubated, no family at the bedside.  Patient was LKN around 2000.  He had been discharged home s/p LAAO w/Amulet device on 11/4/2022 around 1000 am.  He acutely developed confusion with difficulty walking and standing, difficulty conversing, and difficulty finding his bathroom.  There were no reported preceding symptoms and nothing was reported to exacerbate or alleviate the symptoms. He presented to [facility] ED where a CTH was obtained and was negative for acute findings.  Telestroke consult was performed by . TNKase was administered as the patient had been off of Eliquis >24h .  Of note, per the ED record patient was noted to have increased pain and swelling in the right groin in the area of the venous access from his Cardiology procedure and then the patient began to have bleeding and bruising in the anterior thigh on he right side. Patient became hemodynamically unstable and was started on Levophed. Outside Hospital Cardiology deparment placed an IV in the left groin for access. Patient was then stabilized. CT Head was ordered and showed Left ICH. Patient was intubated at outside hospital. Cryo was ordered at the outside hospital but not given. Patient was then transferred to [facility] Neuro [facility] for a higher level of care. On arrival to [facility] the patient is intubated on fentanyl and levophed, NIH 22, hemodynamically unstable, with swelling to the Right groin, unresponsive, Cryo re-ordered and CT Head STAT. Patient will be admitted to Neuro ICU for a higher level of care. Skin integrity JUMANA consulted for evaluation of  skin injury.

## 2022-11-11 NOTE — PROGRESS NOTES
[facility] - Neuro Critical Care  Neurocritical Care  Progress Note    Admit Date: 11/5/2022  Service Date: 11/10/2022  Length of Stay: 5    Subjective:     Chief Complaint: Cerebrovascular accident (CVA) due to embolism of left middle cerebral artery    History of Present Illness: Mr. Luis Sorto Jr. is a 76 y.o. male with PMHx of AFib (on Eliquis),CAD, HTN, DM II, s/p LAAO w/Amulet procedure on 11/04/2022 who presents as a transfer from [facility] to Neuro Critical Care s/p [facility] for evaluation of L MCA stroke w/ICH. HPI information gathered from review of the patient's medical record due to the patient being intubated, no family at the bedside.  Patient was LKN around 2000.  He had been discharged home s/p LAAO w/Amulet device on 11/4/2022 around 1000 am.  He acutely developed confusion with difficulty walking and standing, difficulty conversing, and difficulty finding his bathroom.  There were no reported preceding symptoms and nothing was reported to exacerbate or alleviate the symptoms. He presented to [facility] ED where a CTH was obtained and was negative for acute findings.  Telestroke consult was performed by . TNKase was administered as the patient had been off of Eliquis >24h .  Of note, per the ED record patient was noted to have increased pain and swelling in the right groin in the area of the venous access from his Cardiology procedure and then the patient began to have bleeding and bruising in the anterior thigh on he right side. Patient became hemodynamically unstable and was started on Levophed. Outside Hospital Cardiology deparment placed an IV in the left groin for access. Patient was then stabilized. CT Head was ordered and showed Left ICH. Patient was intubated at outside hospital. Cryo was ordered at the outside hospital but not given. Patient was then transferred to [facility] for a higher level of care. On arrival to [facility] the  patient is intubated on fentanyl and levophed, NIH 22, hemodynamically unstable, with swelling to the Right groin, unresponsive, Cryo re-ordered and CT Head STAT. Patient will be admitted to Neuro ICU for a higher level of care.       Hospital Course: 11/06/2022: Patient is now s/p R pseudoaneurysm repair. Patient with slightly worsening ICH overnight, exam unchanged. Some oozing from the R groin site, however improved after holding pressure and applying a sandbag. Will consider FFP if continues to oozy from groin site. Patient off pressors, fluids increased. TYSON pending due to Echo findings and concern for possible thrombus.Tube feedings started.  11/07/2022: TYSON shows no thrombus and improved EF. Weaning sedation as tolerated. Discontinue venous femoral line. Restart tube feedings post TYSON.  11/08/2022: NAEON. Weaning sedation as tolerated. Transitioning from fentanyl gtt to precedex gtt. Oozing from cath site resolved. Plan for pressure support trial this afternoon. Transitioned from insulin gtt to subq insulin.  11/09/2022: NAEON. Tolerated SBT. Ceftriaxone started for UTI.   11/10/2022: NAEON. Amantadine started. GOC conversation had with son. Continuing SBT. Remains intermittently febrile with improving leukocytosis. Respiratory culture pending. US of the extremities pending to rule out DVT. ETT advanced.       Interval History: NAEON. Tolerated SBT until midnight, then placed back on rate. Continuing SBT today. Precedex intermittently on overnight. Amantadine started. GOC conversation had with son. Remains intermittently febrile with improving leukocytosis. Respiratory culture pending. US of the extremities pending to rule out DVT. ETT advanced.     Review of Systems   Unable to perform ROS: Intubated     Objective:     Vitals:  Temp: (!) 101.7 °F (38.7 °C)  Pulse: (!) 113  Rhythm: (P) atrial rhythm  BP: (!) 145/71  MAP (mmHg): 100  Resp: 18  SpO2: 97 %  Oxygen Concentration (%): 30  O2 Device (Oxygen  Therapy): ventilator  Vent Mode: Spont  Pressure Support: 8 cmH20  PEEP/CPAP: 5 cmH20  Peak Airway Pressure: 14 cmH2O  Mean Airway Pressure: 8.2 cmH20  Plateau Pressure: 0 cmH20    Temp  Min: 98.3 °F (36.8 °C)  Max: 101.7 °F (38.7 °C)  Pulse  Min: 101  Max: 130  BP  Min: 103/55  Max: 185/101  MAP (mmHg)  Min: 76  Max: 125  Resp  Min: 18  Max: 34  SpO2  Min: 94 %  Max: 100 %  Oxygen Concentration (%)  Min: 30  Max: 30    11/09 0701 - 11/10 0700  In: 1256.1 [I.V.:6.1]  Out: 1825 [Urine:1815; Drains:10]           Physical Exam  Vitals and nursing note reviewed.   Constitutional:       General: He is not in acute distress.     Appearance: Normal appearance. He is obese.      Interventions: He is sedated and intubated.      Comments: Well developed. Resting comfortably in bed.   HENT:      Head: Normocephalic and atraumatic.      Right Ear: External ear normal.      Left Ear: External ear normal.      Nose: Nose normal.      Mouth/Throat:      Mouth: Mucous membranes are moist.      Comments: ETT & OGT in place.   Eyes:      General: No scleral icterus.     Pupils: Pupils are equal, round, and reactive to light.   Cardiovascular:      Rate and Rhythm: Regular rhythm. Tachycardia present.   Pulmonary:      Effort: No respiratory distress. He is intubated.      Comments: Intubated & mechanically ventilated.   Vent Mode: Spont  Oxygen Concentration (%):  [30] 30  Resp Rate Total:  [18 br/min-35 br/min] 30 br/min  Vt Set:  [500 mL] 500 mL  PEEP/CPAP:  [5 cmH20] 5 cmH20  Pressure Support:  [5 cmH20-8 cmH20] 8 cmH20  Mean Airway Pressure:  [8.2 cmH20-9.5 cmH20] 8.2 cmH20   Abdominal:      General: Abdomen is flat. There is no distension.      Palpations: Abdomen is soft.   Musculoskeletal:      Right lower leg: No edema.      Left lower leg: No edema.      Comments: R groin hematoma with bruising throughout extending into scrotum. 2 NEHA drains in place with scant drainage. Wound vac in place.   Skin:     General: Skin is warm  and dry.   Neurological:      Comments: G6S2gT4  Not on sedation. Minimally opens eyes to noxious stimuli. Does not track. Does not follow commends.     PERRL. No facial asymmetry. Cough, gag, & corneal reflexes intact (right weaker than left).   RUE flexor posturing.  LUE moves spontaneously. BLE withdraws to noxious stimuli. Sensation intact to noxious stimuli.      Unable to test orientation, language, memory, judgment, insight, fund of knowledge, hearing, shoulder shrug, tongue protrusion, coordination, gait due to level of consciousness.    Medications:  Continuousdexmedetomidine (PRECEDEX) infusion, Last Rate: 0.1 mcg/kg/hr (11/10/22 2136)  dextrose 10 % in water (D10W)  Scheduledamantadine HCL, 100 mg, BID  atorvastatin, 40 mg, Daily  balsam peru-castor oiL, , BID  cefTRIAXone (ROCEPHIN) IVPB, 2 g, Q24H  famotidine, 20 mg, BID  heparin (porcine), 5,000 Units, Q8H  insulin aspart U-100, 9 Units, 6 times per day  levetiracetam, 500 mg, BID  [START ON 11/11/2022] lisinopriL, 40 mg, Daily  metoprolol tartrate, 50 mg, BID  mupirocin, , BID  polyethylene glycol, 17 g, Daily  senna-docusate 8.6-50 mg, 2 tablet, BID  sodium chloride 0.9%, 3 mL, Q8H  PRNacetaminophen, 650 mg, Q6H PRN  bisacodyL, 10 mg, Daily PRN  dextrose 10 % in water (D10W), , Continuous PRN  dextrose 10%, 12.5 g, PRN  dextrose 10%, 25 g, PRN  fentaNYL, 25 mcg, Q1H PRN  glucagon (human recombinant), 1 mg, PRN  hydrALAZINE, 10 mg, Q6H PRN  insulin aspart U-100, 1-10 Units, Q4H PRN  labetalol, 10 mg, Q4H PRN  magnesium oxide, 800 mg, PRN  magnesium oxide, 800 mg, PRN  metoprolol, 5 mg, Q5 Min PRN  ondansetron, 4 mg, Q6H PRN  potassium bicarbonate, 35 mEq, PRN  potassium bicarbonate, 50 mEq, PRN  potassium bicarbonate, 60 mEq, PRN  potassium, sodium phosphates, 2 packet, PRN  potassium, sodium phosphates, 2 packet, PRN  potassium, sodium phosphates, 2 packet, PRN    Today I personally reviewed pertinent medications, lines/drains/airways, imaging,  laboratory results, microbiology results, notably:    Laboratory:  CBC:  Recent Labs   Lab 11/10/22  0212   WBC 8.62   RBC 2.84*   HGB 8.6*   HCT 28.1*      MCV 99*   MCH 30.3   MCHC 30.6*       CMP:  Recent Labs   Lab 11/10/22  0212   CALCIUM 8.1*   PROT 5.6*      K 4.0   CO2 23      BUN 10   CREATININE 0.6   ALKPHOS 266*   ALT 29   AST 38   BILITOT 1.6*       Coagulation:  Recent Labs   Lab 11/10/22  0212   LABPROT 12.8*   INR 1.2   APTT 24.5       ABG:  Recent Labs     11/10/22  0404   PH 7.479*   PCO2 39.9   PO2 37*   HCO3 29.7*   POCSATURATED 75*   BE 6     Imaging:  X-Ray Chest AP Portable:  AP portable chest radiographic examination is submitted.  ET tube is noted, it has been advanced, now just above the level of the ale.  Suspected enteric tube is noted, appearing to extend subdiaphragmatic.  Additional objects overlie the patient.     There is diminished depth of inspiration and the patient is rotated, the entirety of the left costophrenic angle is not included on the field of view.  The cardiomediastinal silhouette appears stable when accounting for the aforementioned.     Accentuation of pulmonary bronchovascular markings in part due to diminished depth of inspiration noted.  There is appearance that may relate to superimposed interstitial and mild patchy alveolar infiltrate, evaluation is limited on this exam.     Noting that the left costophrenic angle is not completely imaged otherwise there is no evidence for significant pleural effusion and there is no evidence for pneumothorax.  The osseous structures demonstrate chronic change.    Electronically signed by: Joesph Christie  Date:                                            11/10/2022  Time:                                           21:35        Microbiology:  Microbiology Results (last 7 days)       Procedure Component Value Units Date/Time    Culture, Respiratory with Gram Stain [[id number]] Collected: 11/10/22 1139    Order  Status: Completed Specimen: Respiratory from Endotracheal Aspirate Updated: 11/10/22 1423     Gram Stain (Respiratory) Rare Gram negative rods     Gram Stain (Respiratory) Moderate WBC's             Diet  Diet NPO  Diet NPO    Assessment/Plan:     Neuro  * Cerebrovascular accident (CVA) due to embolism of left middle cerebral artery  s/p Tenecteplase with new Left ICH   - Continue to hold ASA  - Start SQH     Left-sided nontraumatic intracerebral hemorrhage  Possible stroke s/p Tenecteplase with new Left ICH   -- AFib (on Eliquis)  -- s/p LAAO w/Amulet procedure on 11/04/2022 s/p TNKase  -- Continue Neuro checks q 1hr  -- Vascular Neurology consulted  -- Neurosurgery consulted  -- SBP goal <160   -- PT/OT/Speech  -- Atorvastatin 40 mg  -- Slight increase in size of bleed overnight 11/5, no change in exam  -- No surgical intervention at this time per NSGY team     Pulmonary  On mechanically assisted ventilation  Intubated at the outside hospital for airway protection  Weaning settings as tolerated, currently on minimal vent settings  Daily ABG, CXR  Continue famotidine, peridex  SBT daily  Vent Mode: Spont  Oxygen Concentration (%):  [30] 30  Resp Rate Total:  [18 br/min-35 br/min] 30 br/min  Vt Set:  [500 mL] 500 mL  PEEP/CPAP:  [5 cmH20] 5 cmH20  Pressure Support:  [5 cmH20-8 cmH20] 8 cmH20  Mean Airway Pressure:  [8.2 cmH20-9.5 cmH20] 8.2 cmH20    Cardiac/Vascular  Acute systolic heart failure  Echo shows:   The following segments are hypokinetic: mid inferoseptal, apical septal and apex. Small dyskinetic apical cap with no thrombus seen. All other segments are normal.   The estimated ejection fraction is 45%.   The left ventricle is normal in size with concentric remodeling and mildly decreased systolic function.   Left ventricular diastolic dysfunction.   Normal right ventricular size with normal right ventricular systolic function.   Severe left atrial enlargement.   The estimated PA systolic pressure is  28 mmHg.   Normal central venous pressure (3 mmHg).    TYSON 11/7 shows no thrombus, improved EF of 55%    Pseudoaneurysm of right femoral artery  S/p repair 11/5  Vascular surgery following, appreciate recs  Oozing resolved, will continue to monitor    Hyperlipidemia  -- Lipid panel  -- Atorvastatin 40 mg daily    Persistent atrial fibrillation  -- AFib (on Eliquis), stopped prior to procedure LAAO w/Amulet procedure on 11/04/2022   -- 5mg IV Lopressor upon admission  -- Home metoprolol started  -- Goal K > 4, Mg > 2     Renal/  Acute cystitis without hematuria  - Urine culture positive for Klebsiella  - Continue ceftriaxone 2g qd (D2/7)    Endocrine  Class 2 obesity with body mass index (BMI) of 37.0 to 37.9 in adult  Body mass index is 37.95 kg/m².     Diabetes mellitus  Diabetes Mellitus Type II  -- Hyperglycemic in the 300s on admit  -- HgbA1c 6.1  -- Insulin gtt dc'd 11/8, transitioned to subq insulin with SSI  -- Scheduled aspart increased to 9u q4h   -- SSI  -- Accuchecks  -- Diabetic tube feeds    GI  Oral phase dysphagia  Intubated  OGT in place for nutrition and meds  Cont tube feedings    Groin swelling  -- s/p LAAO w/Amulet procedure on 11/04/2022  s/p TNKase  -- Groin swelling continuous to increase in size  -- Sand bags and manual pressure applied   -- Vascular surgery consulted  -- See pseudoaneurysm        The patient is being Prophylaxed for:  Venous Thromboembolism with: Mechanical or Chemical  Stress Ulcer with: H2B  Ventilator Pneumonia with: chlorhexidine oral care    Activity Orders          Turn patient starting at 11/05 1258    Elevate HOB starting at 11/05 0623    Diet NPO: NPO starting at 11/05 0623        Full Code     Critical condition in that Patient has a condition that poses threat to life and bodily function: L MCA CVA s/p tenecteplase, ICH, vasogenic edema, brain compression,  R femoral pseudoaneurysm, intubated & mechanically ventilated, debility.      36 minutes of critical care  time was spent personally by me on the following activities: development of treatment plan with patient or surrogate and bedside caregivers, discussions with consultants, evaluation of patient's response to treatment, examination of patient, ordering and performing treatments and interventions, ordering and review of laboratory studies, ordering and review of radiographic studies, pulse oximetry, antibiotic titration, re-evaluation of patient's condition. This critical care time did not overlap with that of any other provider or involve time for any procedures. There is high probability for acute neurological change leading to clinical and possibly life-threatening deterioration requiring highest level of physician preparedness for urgent intervention.    Lizbet Evans PA-C  Neurocritical Care  [facility] - Neuro Critical Care

## 2022-11-11 NOTE — PLAN OF CARE
[facility] Care Plan    POC reviewed with Luis Sorto Jr. and family at 1400. Pt verbalized understanding. Questions and concerns addressed. No acute events today. Pt progressing toward goals. Will continue to monitor. See below and flowsheets for full assessment and VS info.     Wife remains vigilant at bedside. Pt condition unchanged from previous. No purposeful movement, no tracking, not responsive. Defies gravity with LUE, occasionally moves LLE. RUE and RLE have no movement. Decerebrate posturing to RUE. Precedex off at 0730 and remained off for shift. No disynchrony on vent.    Stroke booklet at bedside, discussed with wife.            Is this a stroke patient? yes- Stroke booklet reviewed with patient, risk factors identified for patient and stroke booklet remains at bedside for ongoing education.     Neuro:  [place] Coma Scale  Best Eye Response: 2-->(E2) to pain  Best Motor Response: 4-->(M4) withdraws from pain  Best Verbal Response: 1-->(V1) none  [place] Coma Scale Score: 7  Assessment Qualifiers: patient intubated, patient chemically sedated or paralyzed  Pupil PERRLA: yes     24 hr Temp:  [100.1 °F (37.8 °C)-102.2 °F (39 °C)]     CV:   Rhythm: atrial rhythm  BP goals:   SBP < 160  MAP > 65    Resp:   O2 Device (Oxygen Therapy): ventilator  Vent Mode: SIMV  Set Rate: 14 BPM  Oxygen Concentration (%): 30  Vt Set: 500 mL  PEEP/CPAP: 5 cmH20  Pressure Support: 5 cmH20    Plan: N/A    GI/:     Diet/Nutrition Received: tube feeding  Last Bowel Movement: 11/11/22  Voiding Characteristics: urethral catheter (bladder)    Intake/Output Summary (Last 24 hours) at 11/11/2022 1456  Last data filed at 11/11/2022 1401  Gross per 24 hour   Intake 1512.01 ml   Output 2795 ml   Net -1282.99 ml          Labs/Accuchecks:  Recent Labs   Lab 11/11/22 0224   WBC 9.07   RBC 2.93*   HGB 9.1*   HCT 28.2*         Recent Labs   Lab 11/11/22 0224   *   K 3.9   CO2 23      BUN 14   CREATININE 0.6   ALKPHOS 270*    ALT 60*   AST 80*   BILITOT 1.4*      Recent Labs   Lab 11/10/22  0212   INR 1.2   APTT 24.5      Recent Labs   Lab 11/05/22  0632 11/05/22  1201 11/06/22  0003     --   --    CPKMB 2.4  --   --    TROPONINI 0.141*   < > 0.053*   MB 2.1  --   --     < > = values in this interval not displayed.       Electrolytes: N/A - electrolytes WDL  Accuchecks: Q4H    Gtts:   dexmedetomidine (PRECEDEX) infusion Stopped (11/11/22 0730)    dextrose 10 % in water (D10W)         LDA/Wounds:  Lines/Drains/Airways       Drain  Duration                  NG/OG Tube 11/05/22 0333 orogastric 18 Fr. Center mouth 6 days         Urethral Catheter 11/05/22 0630 Non-latex 16 Fr. 6 days         Closed/Suction Drain 11/05/22 1741 Right;Anterior Groin Bulb 15 Fr. 5 days         Closed/Suction Drain 11/05/22 1742 Right;Anterior Hip Bulb 15 Fr. 5 days              Airway  Duration                  Airway - Non-Surgical 11/05/22 0328 6 days              Peripheral Intravenous Line  Duration                  Peripheral IV - Single Lumen 11/05/22 20 G Anterior;Right Upper Arm 6 days         Peripheral IV - Single Lumen 11/09/22 18 G Anterior;Distal;Left Upper Arm 2 days         Peripheral IV - Single Lumen 11/09/22 20 G Anterior;Left Forearm 2 days                  Wounds: No  Wound care consulted: No

## 2022-11-11 NOTE — ASSESSMENT & PLAN NOTE
Vent Mode: SIMV  Oxygen Concentration (%):  [30] 30  Resp Rate Total:  [16 br/min-35 br/min] 22 br/min  Vt Set:  [500 mL] 500 mL  PEEP/CPAP:  [5 cmH20] 5 cmH20  Pressure Support:  [5 cmH20-8 cmH20] 5 cmH20  Mean Airway Pressure:  [7.8 cmH20-9.1 cmH20] 9.1 cmH20     Daily CXR and abg  Respiratory culture growing gram neg rods  initiated Zosyn

## 2022-11-11 NOTE — NURSING
[facility] Care Plan    POC reviewed with Luis Sorto Jr. and family at 0300. Pt unable to verbalize understanding. Questions and concerns addressed with wife at bedside. No acute events overnight. Pt progressing toward goals. Will continue to monitor. See below and flowsheets for full assessment and VS info.           Is this a stroke patient? yes- Stroke booklet reviewed with patient and family, risk factors identified for patient and stroke booklet remains at bedside for ongoing education.     Neuro:  [place] Coma Scale  Best Eye Response: 2-->(E2) to pain  Best Motor Response: 4-->(M4) withdraws from pain  Best Verbal Response: 1-->(V1) none  [place] Coma Scale Score: 7  Assessment Qualifiers: patient intubated  Pupil PERRLA: yes     24hr Temp:  [98.3 °F (36.8 °C)-102.2 °F (39 °C)]     CV:   Rhythm: atrial rhythm  BP goals:   SBP < 160  MAP > 65    Resp:   O2 Device (Oxygen Therapy): ventilator  Vent Mode: Spont  Set Rate: 14 BPM  Oxygen Concentration (%): 30  Vt Set: 500 mL  PEEP/CPAP: 5 cmH20  Pressure Support: 8 cmH20    Plan: N/A    GI/:     Diet/Nutrition Received: tube feeding  Last Bowel Movement: 11/09/22  Voiding Characteristics: urethral catheter (bladder)    Intake/Output Summary (Last 24 hours) at 11/11/2022 0558  Last data filed at 11/11/2022 0501  Gross per 24 hour   Intake 1437.01 ml   Output 1600 ml   Net -162.99 ml          Labs/Accuchecks:  Recent Labs   Lab 11/11/22 0224   WBC 9.07   RBC 2.93*   HGB 9.1*   HCT 28.2*         Recent Labs   Lab 11/11/22 0224   *   K 3.9   CO2 23      BUN 14   CREATININE 0.6   ALKPHOS 270*   ALT 60*   AST 80*   BILITOT 1.4*      Recent Labs   Lab 11/10/22  0212   INR 1.2   APTT 24.5      Recent Labs   Lab 11/05/22  0632 11/05/22  1201 11/06/22  0003     --   --    CPKMB 2.4  --   --    TROPONINI 0.141*   < > 0.053*   MB 2.1  --   --     < > = values in this interval not displayed.       Electrolytes: N/A - electrolytes WDL  Accuchecks:  Q4H    Gtts:   dexmedetomidine (PRECEDEX) infusion 0.1 mcg/kg/hr (11/10/22 2136)    dextrose 10 % in water (D10W)         LDA/Wounds:  Lines/Drains/Airways       Drain  Duration                  NG/OG Tube 11/05/22 0333 orogastric 18 Fr. Center mouth 6 days         Urethral Catheter 11/05/22 0630 Non-latex 16 Fr. 6 days         Closed/Suction Drain 11/05/22 1741 Right;Anterior Groin Bulb 15 Fr. 5 days         Closed/Suction Drain 11/05/22 1742 Right;Anterior Hip Bulb 15 Fr. 5 days              Airway  Duration                  Airway - Non-Surgical 11/05/22 0328 6 days              Peripheral Intravenous Line  Duration                  Peripheral IV - Single Lumen 11/05/22 20 G Anterior;Right Upper Arm 6 days         Peripheral IV - Single Lumen 11/09/22 18 G Anterior;Distal;Left Upper Arm 2 days         Peripheral IV - Single Lumen 11/09/22 20 G Anterior;Left Forearm 2 days                  Wounds: Yes  Wound care consulted: Yes

## 2022-11-11 NOTE — PROGRESS NOTES
[facility] - Neuro Critical Care  Neurocritical Care  Progress Note    Admit Date: 11/5/2022  Service Date: 11/11/2022  Length of Stay: 6    Subjective:     Chief Complaint: Cerebrovascular accident (CVA) due to embolism of left middle cerebral artery    History of Present Illness: Mr. Luis Sorto Jr. is a 76 y.o. male with PMHx of AFib (on Eliquis),CAD, HTN, DM II, s/p LAAO w/Amulet procedure on 11/04/2022 who presents as a transfer from [facility] to Neuro Critical Care s/p [facility] for evaluation of L MCA stroke w/ICH. HPI information gathered from review of the patient's medical record due to the patient being intubated, no family at the bedside.  Patient was LKN around 2000.  He had been discharged home s/p LAAO w/Amulet device on 11/4/2022 around 1000 am.  He acutely developed confusion with difficulty walking and standing, difficulty conversing, and difficulty finding his bathroom.  There were no reported preceding symptoms and nothing was reported to exacerbate or alleviate the symptoms. He presented to [facility] ED where a CTH was obtained and was negative for acute findings.  Telestroke consult was performed by . TNKase was administered as the patient had been off of Eliquis >24h .  Of note, per the ED record patient was noted to have increased pain and swelling in the right groin in the area of the venous access from his Cardiology procedure and then the patient began to have bleeding and bruising in the anterior thigh on he right side. Patient became hemodynamically unstable and was started on Levophed. Outside Hospital Cardiology deparment placed an IV in the left groin for access. Patient was then stabilized. CT Head was ordered and showed Left ICH. Patient was intubated at outside hospital. Cryo was ordered at the outside hospital but not given. Patient was then transferred to [facility] Neuro [facility] for a higher level of care. On arrival to [facility] the  patient is intubated on fentanyl and levophed, NIH 22, hemodynamically unstable, with swelling to the Right groin, unresponsive, Cryo re-ordered and CT Head STAT. Patient will be admitted to Neuro ICU for a higher level of care.       Hospital Course: 11/06/2022: Patient is now s/p R pseudoaneurysm repair. Patient with slightly worsening ICH overnight, exam unchanged. Some oozing from the R groin site, however improved after holding pressure and applying a sandbag. Will consider FFP if continues to oozy from groin site. Patient off pressors, fluids increased. TYSON pending due to Echo findings and concern for possible thrombus.Tube feedings started.  11/07/2022: TYSON shows no thrombus and improved EF. Weaning sedation as tolerated. Discontinue venous femoral line. Restart tube feedings post TYSON.  11/08/2022: NAEON. Weaning sedation as tolerated. Transitioning from fentanyl gtt to precedex gtt. Oozing from cath site resolved. Plan for pressure support trial this afternoon. Transitioned from insulin gtt to subq insulin.  11/09/2022: NAEON. Tolerated SBT. Ceftriaxone started for UTI.   11/10/2022: NAEON. Amantadine started. GOC conversation had with son. Continuing SBT. Remains intermittently febrile with improving leukocytosis. Respiratory culture pending. US of the extremities pending to rule out DVT. ETT advanced.   11/11/2022: gram negative in resp cx- start Zosyn, updated patient's wife at bedside, GOC discussed today and Patient's wife will have a decision on Monday          Review of Systems  Unable to obtain a complete ROS due to level of consciousness.  Objective:     Vitals:  Temp: 100.3 °F (37.9 °C)  Pulse: 101  Rhythm: atrial rhythm  BP: (!) 142/81  MAP (mmHg): 106  Resp: (!) 22  SpO2: 98 %  Oxygen Concentration (%): 30  O2 Device (Oxygen Therapy): ventilator  Vent Mode: SIMV  Set Rate: 14 BPM  Vt Set: 500 mL  Pressure Support: 5 cmH20  PEEP/CPAP: 5 cmH20  Peak Airway Pressure: 10 cmH2O  Mean Airway Pressure:  9.1 cmH20  Plateau Pressure: 0 cmH20    Temp  Min: 100.1 °F (37.8 °C)  Max: 102.2 °F (39 °C)  Pulse  Min: 93  Max: 129  BP  Min: 129/77  Max: 192/74  MAP (mmHg)  Min: 87  Max: 122  Resp  Min: 15  Max: 38  SpO2  Min: 92 %  Max: 99 %  Oxygen Concentration (%)  Min: 30  Max: 30    11/10 0701 - 11/11 0700  In: 1437 [I.V.:67.7]  Out: 2250 [Urine:2250]           Physical Exam  GA:  comfortable, no acute distress.   HEENT: No scleral icterus or JVD.   Pulmonary: Diminished Bibasilar to auscultation A/L.   Cardiac: RRR S1 & S2 w/o rubs/murmurs/gallops.   Abdominal: Bowel sounds present x 4. No appreciable hepatosplenomegaly.  Skin: No jaundice, rashes, or visible lesions.  Neuro:  --GCS: E2 Vt1 M5  --Mental Status:  opens eyes to pain, doesn't follow commands  --CN II-XII grossly intact.   --Pupils 3mm, PERRL.   --Corneal reflex, gag, cough intact.  --LUE withdraws  --RUE  no movement to pain  --BLE withdraws to pain  Medications:  Continuousdexmedetomidine (PRECEDEX) infusion, Last Rate: Stopped (11/11/22 0730)  dextrose 10 % in water (D10W)    Scheduledamantadine HCL, 100 mg, BID  atorvastatin, 40 mg, Daily  balsam peru-castor oiL, , BID  famotidine, 20 mg, BID  heparin (porcine), 5,000 Units, Q8H  insulin aspart U-100, 9 Units, 6 times per day  levetiracetam, 500 mg, BID  lisinopriL, 40 mg, Daily  metoprolol tartrate, 50 mg, Q8H  mupirocin, , BID  piperacillin-tazobactam (ZOSYN) IVPB, 4.5 g, Q8H  polyethylene glycol, 17 g, Daily  senna-docusate 8.6-50 mg, 2 tablet, BID  sodium chloride 0.9%, 3 mL, Q8H    PRNacetaminophen, 650 mg, Q6H PRN  bisacodyL, 10 mg, Daily PRN  dextrose 10 % in water (D10W), , Continuous PRN  dextrose 10%, 12.5 g, PRN  dextrose 10%, 25 g, PRN  fentaNYL, 25 mcg, Q1H PRN  glucagon (human recombinant), 1 mg, PRN  hydrALAZINE, 10 mg, Q6H PRN  insulin aspart U-100, 1-10 Units, Q4H PRN  labetalol, 10 mg, Q4H PRN  magnesium oxide, 800 mg, PRN  magnesium oxide, 800 mg, PRN  metoprolol, 5 mg, Q5 Min  PRN  ondansetron, 4 mg, Q6H PRN  potassium bicarbonate, 35 mEq, PRN  potassium bicarbonate, 50 mEq, PRN  potassium bicarbonate, 60 mEq, PRN  potassium, sodium phosphates, 2 packet, PRN  potassium, sodium phosphates, 2 packet, PRN  potassium, sodium phosphates, 2 packet, PRN      Today I personally reviewed pertinent medications, lines/drains/airways, imaging, cardiology results, laboratory results, microbiology results,     Diet  Diet NPO            Assessment/Plan:     Neuro  * Cerebrovascular accident (CVA) due to embolism of left middle cerebral artery  s/p Tenecteplase with new Left ICH   - Continue to hold ASA  Sq heparin    Left-sided nontraumatic intracerebral hemorrhage   stroke s/p Tenecteplase with new Left ICH   -- AFib (on Eliquis)  -- s/p LAAO w/Amulet procedure on 11/04/2022 s/p TNKase  -- Continue Neuro checks q 1hr  -- Vascular Neurology consulted  -- Neurosurgery consulted  -- SBP goal <160   -- PT/OT/Speech  -- Atorvastatin 40 mg  -- Slight increase in size of bleed overnight 11/5, no change in exam  -- No surgical intervention at this time per NSGY team   11/11/2022: goals of care discussion today with patient's wife at bedside.   Patient's wife stated she will have an answer on Monday    Pulmonary  On mechanically assisted ventilation  Vent Mode: SIMV  Oxygen Concentration (%):  [30] 30  Resp Rate Total:  [16 br/min-35 br/min] 22 br/min  Vt Set:  [500 mL] 500 mL  PEEP/CPAP:  [5 cmH20] 5 cmH20  Pressure Support:  [5 cmH20-8 cmH20] 5 cmH20  Mean Airway Pressure:  [7.8 cmH20-9.1 cmH20] 9.1 cmH20     Daily CXR and abg  Respiratory culture growing gram neg rods  initiated Zosyn      Cardiac/Vascular  Acute systolic heart failure  Echo shows:   The following segments are hypokinetic: mid inferoseptal, apical septal and apex. Small dyskinetic apical cap with no thrombus seen. All other segments are normal.   The estimated ejection fraction is 45%.   The left ventricle is normal in size with  concentric remodeling and mildly decreased systolic function.   Left ventricular diastolic dysfunction.   Normal right ventricular size with normal right ventricular systolic function.   Severe left atrial enlargement.   The estimated PA systolic pressure is 28 mmHg.   Normal central venous pressure (3 mmHg).    TYSON 11/7 shows no thrombus, improved EF of 55%    Hyperlipidemia  -- Lipid panel  -- Atorvastatin 40 mg daily    Persistent atrial fibrillation  -- AFib (on Eliquis), stopped prior to procedure LAAO w/Amulet procedure on 11/04/2022   -- 5mg IV Lopressor upon admission  -- Home metoprolol started  -- Goal K > 4, Mg > 2   11/11/2022: metoprolol increased to q8 hours      Endocrine  Class 2 obesity with body mass index (BMI) of 37.0 to 37.9 in adult  Body mass index is 37.95 kg/m².     Diabetes mellitus  Diabetes Mellitus Type II  -- Hyperglycemic in the 300s on admit  -- HgbA1c 6.1  -- Insulin gtt dc'd 11/8, transitioned to subq insulin with SSI  -- Scheduled aspart increased to 9u q4h   -- SSI  -- Accuchecks  -- Diabetic tube feeds    GI  Groin swelling  -- s/p LAAO w/Amulet procedure on 11/04/2022  s/p TNKase  -- Groin swelling continuous to increase in size  -- Sand bags and manual pressure applied   -- Vascular surgery consulted  -- See pseudoaneurysm          The patient is being Prophylaxed for:  Venous Thromboembolism with: Chemical  Stress Ulcer with: H2B  Ventilator Pneumonia with: chlorhexidine oral care    Activity Orders          Turn patient starting at 11/05 1258    Elevate HOB starting at 11/05 0623    Diet NPO: NPO starting at 11/05 0623        Full Code    Mellissa Valadez NP  Neurocritical Care  [facility] - Neuro Critical Care

## 2022-11-11 NOTE — ASSESSMENT & PLAN NOTE
- consult received for evaluation of skin injury.  - presents as a transfer from [facility] to Neuro Critical Care s/p [facility] for evaluation of L MCA stroke w/ICH.  - purple bruise noted to thoracic region of back. No open wounds or skin breakdown noted.  - sacral region without injury. Sacral foam intact.  - pt is incontinent of stool and immobile increasing his injury of skin breakdown.  - BPCO ordered bid/prn to thoracic region.  - lulu surface utilized.  - RN at bedside and assisted with positioning.  - tolerating TFs.  - heel boots for offloading.  - strict q2h turns.  - nursing to maintain pressure injury prevention measures and continue wound care per orders.

## 2022-11-11 NOTE — PT/OT/SLP PROGRESS
Occupational Therapy   Treatment    Name: Luis Sorto Jr.  MRN: [id number]  Admitting Diagnosis:  Cerebrovascular accident (CVA) due to embolism of left middle cerebral artery  4 Days Post-Op    Recommendations:     Discharge Recommendations:  (pending extubation)  Discharge Equipment Recommendations:   (pending extubation)  Barriers to discharge:  None    Assessment:     Luis Sorto Jr. is a 76 y.o. male with a medical diagnosis of Cerebrovascular accident (CVA) due to embolism of left middle cerebral artery.  He presents with performance deficits affecting function are weakness, decreased ROM, impaired cognition, impaired endurance, impaired sensation, decreased coordination, decreased upper extremity function, impaired self care skills, impaired functional mobility, decreased lower extremity function, decreased safety awareness, gait instability, impaired balance, abnormal tone.     Rehab Prognosis:  Good; patient would benefit from acute skilled OT services to address these deficits and reach maximum level of function.       Plan:     Patient to be seen 3 x/week to address the above listed problems via cognitive retraining, sensory integration, neuromuscular re-education, therapeutic exercises, therapeutic activities, self-care/home management  Plan of Care Expires: 11/14/22  Plan of Care Reviewed with: patient    Subjective   Patient:  Nonverbal; intubated  Pain/Comfort:  Pain Rating 1: 0/10  Pain Rating Post-Intervention 1: 0/10    Objective:     Communicated with: Nurse prior to session.  Patient found supine with bed alarm, ventilator, telemetry, pulse ox (continuous), pressure relief boots, blood pressure cuff, peripheral IV, wound vac, restraints, SCD, bhardwaj catheter (cooling blanket) upon OT entry to room.    General Precautions: Standard, aspiration, fall, NPO   Orthopedic Precautions:N/A   Braces: N/A  Respiratory Status:  ventilator     Occupational Performance:     Bed Mobility:    Patient  completed Rolling/Turning to Left with  total assistance  Patient completed Rolling/Turning to Right with total assistance     Functional Mobility/Transfers:  Dependent drawsheet transfers    Activities of Daily Living:  Feeding:  NPO    Grooming: total assistance while supine    [facility] 6 Click ADL: 6    Treatment & Education:  Patient education provided on role of OT.  Continued education, patient/ family training recommended.  Daily orientation provided.  PROM performed bilateral UE/LEs one set x 6 rep in all planes of motion with stretches provided at end range; sustained stretch provided for ankle dorsiflexion.  Assistance and facilitation provided for upward rotation of the scapula during shoulder flexion and abduction to promote orientation of glenoid fossa of scapula to humeral head for prevention of post-stroke hemiplegic pain.  Positioning provided for midline orientation with bilateral UEs elevated and heels lifted off mattress.    Patient left supine with all lines intact, call button in reach, and bed alarm on    GOALS:   Multidisciplinary Problems       Occupational Therapy Goals          Problem: Occupational Therapy    Goal Priority Disciplines Outcome Interventions   Occupational Therapy Goal     OT, PT/OT Ongoing, Progressing    Description: Goals set 11/8 to be addressed for 14 days with expiration date, 11/22:  Patient will increase functional independence with ADLs by performing:    Patient will demonstrate rolling to the right with max assist.  Not met   Patient will demonstrate rolling to the left with max assist.   Not met  Patient will demonstrate supine -sit with max  assist.   Not met  Patient will demonstrate stand pivot transfers with max assist.   Not met  Patient will demonstrate grooming while seated with max assist.   Not met  Patient will demonstrate upper body dressing with max assist while seated EOB.   Not met  Patient will demonstrate lower body dressing with max assist while  seated EOB.   Not met  Patient will demonstrate toileting with max assist.   Not met  Patient's family / caregiver will demonstrate independence and safety with assisting patient with self-care skills and functional mobility.     Not met  Patient's family / caregiver will demonstrate independence with providing ROM and changes in bed positioning.   Not met  Patient and/or patient's family will verbalize understanding of stroke prevention guidelines, personal risk factors and stroke warning signs via teachback method.  Not met                                  Time Tracking:     OT Date of Treatment: 11/11/22  OT Start Time: 0416  OT Stop Time: 0426  OT Total Time (min): 10 min    Billable Minutes:Neuromuscular Re-education 10    OT/TIFFANY: OT          11/11/2022

## 2022-11-11 NOTE — SUBJECTIVE & OBJECTIVE
Scheduled Meds:   amantadine HCL  100 mg Per NG tube BID    atorvastatin  40 mg Per OG tube Daily    balsam peru-castor oiL   Topical (Top) BID    famotidine  20 mg Per OG tube BID    heparin (porcine)  5,000 Units Subcutaneous Q8H    insulin aspart U-100  9 Units Subcutaneous 6 times per day    levetiracetam  500 mg Per OG tube BID    lisinopriL  40 mg Per OG tube Daily    metoprolol tartrate  50 mg Per OG tube Q8H    mupirocin   Nasal BID    piperacillin-tazobactam (ZOSYN) IVPB  4.5 g Intravenous Q8H    polyethylene glycol  17 g Per NG tube Daily    senna-docusate 8.6-50 mg  2 tablet Per OG tube BID    sodium chloride 0.9%  3 mL Intravenous Q8H     Continuous Infusions:   dexmedetomidine (PRECEDEX) infusion Stopped (11/11/22 0730)    dextrose 10 % in water (D10W)       PRN Meds:acetaminophen, bisacodyL, dextrose 10 % in water (D10W), dextrose 10%, dextrose 10%, fentaNYL, glucagon (human recombinant), hydrALAZINE, insulin aspart U-100, labetalol, magnesium oxide, magnesium oxide, metoprolol, ondansetron, potassium bicarbonate, potassium bicarbonate, potassium bicarbonate, potassium, sodium phosphates, potassium, sodium phosphates, potassium, sodium phosphates    Review of patient's allergies indicates:  No Known Allergies     Past Medical History:   Diagnosis Date    A-fib     Anticoagulant long-term use     Coronary artery disease     Diabetes mellitus     no meds    Digestive disorder     Hypertension     Paroxysmal atrial fibrillation     Renal disorder     kidney stone    Sleep apnea     c pap machine used    Stroke     tia    Unspecified glaucoma      Past Surgical History:   Procedure Laterality Date    CARDIOVERSION      CYSTOSCOPY      EXCLUSION OF LEFT ATRIAL APPENDAGE N/A 11/4/2022    Procedure: EXCLUSION, LEFT ATRIAL APPENDAGE;  Surgeon: Dallas Gold MD;  Location: [facility] CATH;  Service: Cardiology;  Laterality: N/A;    EYE SURGERY      JOINT REPLACEMENT Bilateral     REPAIR, PSEUDOANEURYSM, ARTERY,  FEMORAL Right 11/5/2022    Procedure: REPAIR, PSEUDOANEURYSM, ARTERY, FEMORAL;  Surgeon: Simba Turner MD;  [facility];  Service: Vascular;  Laterality: Right;    TRANSESOPHAGEAL ECHOCARDIOGRAPHY N/A 11/3/2022    Procedure: ECHOCARDIOGRAM, TRANSESOPHAGEAL;  Surgeon: Ahmet Bowers MD;  Location: [facility] CATH;  Service: Cardiology;  Laterality: N/A;    TRANSESOPHAGEAL ECHOCARDIOGRAPHY N/A 11/4/2022    Procedure: ECHOCARDIOGRAM, TRANSESOPHAGEAL;  Surgeon: Noel Carpenter MD;  Location: [facility] CATH;  Service: Cardiovascular;  Laterality: N/A;       Family History       Problem Relation (Age of Onset)    Arthritis Mother    Diabetes Mother    Heart disease Mother          Tobacco Use    Smoking status: Never    Smokeless tobacco: Not on file   Substance and Sexual Activity    Alcohol use: No    Drug use: No    Sexual activity: Never     Review of Systems   Skin:  Positive for wound.     Objective:     Vital Signs (Most Recent):  Temp: 100.3 °F (37.9 °C) (11/11/22 1101)  Pulse: 101 (11/11/22 1401)  Resp: (!) 22 (11/11/22 1401)  BP: (!) 142/81 (11/11/22 1401)  SpO2: 98 % (11/11/22 1401)   Vital Signs (24h Range):  Temp:  [100.1 °F (37.8 °C)-102.2 °F (39 °C)] 100.3 °F (37.9 °C)  Pulse:  [] 101  Resp:  [15-38] 22  SpO2:  [92 %-99 %] 98 %  BP: (129-192)/(62-88) 142/81     Weight: 116.6 kg (257 lb)  Body mass index is 37.95 kg/m².  Physical Exam  Constitutional:       Appearance: Normal appearance.   Skin:     General: Skin is warm and dry.      Findings: Lesion present.   Neurological:      Mental Status: He is alert.       Laboratory:  All pertinent labs reviewed within the last 24 hours.    Diagnostic Results:  None

## 2022-11-12 PROBLEM — C79.51 MALIGNANT NEOPLASM METASTATIC TO BONE: Status: ACTIVE | Noted: 2022-11-12

## 2022-11-12 LAB
ALBUMIN SERPL BCP-MCNC: 1.9 G/DL (ref 3.5–5.2)
ALLENS TEST: ABNORMAL
ALP SERPL-CCNC: 275 U/L (ref 55–135)
ALT SERPL W/O P-5'-P-CCNC: 95 U/L (ref 10–44)
ANION GAP SERPL CALC-SCNC: 9 MMOL/L (ref 8–16)
AST SERPL-CCNC: 110 U/L (ref 10–40)
BACTERIA SPEC AEROBE CULT: ABNORMAL
BASOPHILS # BLD AUTO: 0.05 K/UL (ref 0–0.2)
BASOPHILS NFR BLD: 0.5 % (ref 0–1.9)
BILIRUB SERPL-MCNC: 1.5 MG/DL (ref 0.1–1)
BUN SERPL-MCNC: 15 MG/DL (ref 8–23)
CALCIUM SERPL-MCNC: 8.2 MG/DL (ref 8.7–10.5)
CHLORIDE SERPL-SCNC: 104 MMOL/L (ref 95–110)
CO2 SERPL-SCNC: 26 MMOL/L (ref 23–29)
CREAT SERPL-MCNC: 0.5 MG/DL (ref 0.5–1.4)
DELSYS: ABNORMAL
DIFFERENTIAL METHOD: ABNORMAL
EOSINOPHIL # BLD AUTO: 0.2 K/UL (ref 0–0.5)
EOSINOPHIL NFR BLD: 2.3 % (ref 0–8)
ERYTHROCYTE [DISTWIDTH] IN BLOOD BY AUTOMATED COUNT: 15.4 % (ref 11.5–14.5)
ERYTHROCYTE [SEDIMENTATION RATE] IN BLOOD BY WESTERGREN METHOD: 14 MM/H
EST. GFR  (NO RACE VARIABLE): >60 ML/MIN/1.73 M^2
FIO2: 30
GLUCOSE SERPL-MCNC: 110 MG/DL (ref 70–110)
GRAM STN SPEC: ABNORMAL
GRAM STN SPEC: ABNORMAL
HCO3 UR-SCNC: 27.9 MMOL/L (ref 24–28)
HCT VFR BLD AUTO: 30 % (ref 40–54)
HGB BLD-MCNC: 9.4 G/DL (ref 14–18)
IMM GRANULOCYTES # BLD AUTO: 0.1 K/UL (ref 0–0.04)
IMM GRANULOCYTES NFR BLD AUTO: 0.9 % (ref 0–0.5)
LYMPHOCYTES # BLD AUTO: 1.6 K/UL (ref 1–4.8)
LYMPHOCYTES NFR BLD: 15.2 % (ref 18–48)
MAGNESIUM SERPL-MCNC: 2.4 MG/DL (ref 1.6–2.6)
MCH RBC QN AUTO: 30.3 PG (ref 27–31)
MCHC RBC AUTO-ENTMCNC: 31.3 G/DL (ref 32–36)
MCV RBC AUTO: 97 FL (ref 82–98)
MODE: ABNORMAL
MONOCYTES # BLD AUTO: 1.1 K/UL (ref 0.3–1)
MONOCYTES NFR BLD: 9.9 % (ref 4–15)
NEUTROPHILS # BLD AUTO: 7.5 K/UL (ref 1.8–7.7)
NEUTROPHILS NFR BLD: 71.2 % (ref 38–73)
NRBC BLD-RTO: 0 /100 WBC
PCO2 BLDA: 35 MMHG (ref 35–45)
PEEP: 5
PH SMN: 7.51 [PH] (ref 7.35–7.45)
PHOSPHATE SERPL-MCNC: 3.3 MG/DL (ref 2.7–4.5)
PIP: 11
PLATELET # BLD AUTO: 315 K/UL (ref 150–450)
PMV BLD AUTO: 8.9 FL (ref 9.2–12.9)
PO2 BLDA: 91 MMHG (ref 80–100)
POC BE: 5 MMOL/L
POC SATURATED O2: 98 % (ref 95–100)
POC TCO2: 29 MMOL/L (ref 23–27)
POCT GLUCOSE: 106 MG/DL (ref 70–110)
POCT GLUCOSE: 113 MG/DL (ref 70–110)
POCT GLUCOSE: 122 MG/DL (ref 70–110)
POCT GLUCOSE: 124 MG/DL (ref 70–110)
POCT GLUCOSE: 69 MG/DL (ref 70–110)
POTASSIUM SERPL-SCNC: 3.7 MMOL/L (ref 3.5–5.1)
PROSTATE SPECIFIC ANTIGEN, TOTAL: 33.4 NG/ML (ref 0–4)
PROT SERPL-MCNC: 6.2 G/DL (ref 6–8.4)
PS: 5
PSA FREE MFR SERPL: 10.87 %
PSA FREE SERPL-MCNC: 3.63 NG/ML (ref 0–1.5)
RBC # BLD AUTO: 3.1 M/UL (ref 4.6–6.2)
SAMPLE: ABNORMAL
SITE: ABNORMAL
SODIUM SERPL-SCNC: 139 MMOL/L (ref 136–145)
SP02: 98
VT: 500
WBC # BLD AUTO: 10.58 K/UL (ref 3.9–12.7)

## 2022-11-12 PROCEDURE — 27000221 HC OXYGEN, UP TO 24 HOURS

## 2022-11-12 PROCEDURE — 83735 ASSAY OF MAGNESIUM: CPT | Performed by: PHYSICIAN ASSISTANT

## 2022-11-12 PROCEDURE — 94003 VENT MGMT INPAT SUBQ DAY: CPT

## 2022-11-12 PROCEDURE — 27200966 HC CLOSED SUCTION SYSTEM

## 2022-11-12 PROCEDURE — 25000003 PHARM REV CODE 250

## 2022-11-12 PROCEDURE — 63600175 PHARM REV CODE 636 W HCPCS

## 2022-11-12 PROCEDURE — 94761 N-INVAS EAR/PLS OXIMETRY MLT: CPT

## 2022-11-12 PROCEDURE — 94640 AIRWAY INHALATION TREATMENT: CPT

## 2022-11-12 PROCEDURE — 99900035 HC TECH TIME PER 15 MIN (STAT)

## 2022-11-12 PROCEDURE — 94668 MNPJ CHEST WALL SBSQ: CPT

## 2022-11-12 PROCEDURE — 99291 PR CRITICAL CARE, E/M 30-74 MINUTES: ICD-10-PCS | Mod: ,,, | Performed by: PSYCHIATRY & NEUROLOGY

## 2022-11-12 PROCEDURE — 25000003 PHARM REV CODE 250: Performed by: NURSE PRACTITIONER

## 2022-11-12 PROCEDURE — 84153 ASSAY OF PSA TOTAL: CPT | Performed by: PHYSICIAN ASSISTANT

## 2022-11-12 PROCEDURE — 99900026 HC AIRWAY MAINTENANCE (STAT)

## 2022-11-12 PROCEDURE — 25000242 PHARM REV CODE 250 ALT 637 W/ HCPCS: Performed by: NURSE PRACTITIONER

## 2022-11-12 PROCEDURE — 85025 COMPLETE CBC W/AUTO DIFF WBC: CPT | Performed by: PHYSICIAN ASSISTANT

## 2022-11-12 PROCEDURE — 84100 ASSAY OF PHOSPHORUS: CPT | Performed by: PHYSICIAN ASSISTANT

## 2022-11-12 PROCEDURE — 20000000 HC ICU ROOM

## 2022-11-12 PROCEDURE — 25000003 PHARM REV CODE 250: Performed by: PHYSICIAN ASSISTANT

## 2022-11-12 PROCEDURE — 99291 CRITICAL CARE FIRST HOUR: CPT | Mod: ,,, | Performed by: PSYCHIATRY & NEUROLOGY

## 2022-11-12 PROCEDURE — 63600175 PHARM REV CODE 636 W HCPCS: Performed by: PHYSICIAN ASSISTANT

## 2022-11-12 PROCEDURE — 36600 WITHDRAWAL OF ARTERIAL BLOOD: CPT

## 2022-11-12 PROCEDURE — 63600175 PHARM REV CODE 636 W HCPCS: Performed by: NURSE PRACTITIONER

## 2022-11-12 PROCEDURE — A4216 STERILE WATER/SALINE, 10 ML: HCPCS | Performed by: PHYSICIAN ASSISTANT

## 2022-11-12 PROCEDURE — 25000242 PHARM REV CODE 250 ALT 637 W/ HCPCS: Performed by: PSYCHIATRY & NEUROLOGY

## 2022-11-12 PROCEDURE — 82803 BLOOD GASES ANY COMBINATION: CPT

## 2022-11-12 PROCEDURE — 84154 ASSAY OF PSA FREE: CPT | Performed by: PHYSICIAN ASSISTANT

## 2022-11-12 PROCEDURE — 80053 COMPREHEN METABOLIC PANEL: CPT | Performed by: PHYSICIAN ASSISTANT

## 2022-11-12 RX ORDER — METOPROLOL TARTRATE 1 MG/ML
INJECTION, SOLUTION INTRAVENOUS
Status: COMPLETED
Start: 2022-11-12 | End: 2022-11-12

## 2022-11-12 RX ORDER — QUETIAPINE FUMARATE 25 MG/1
25 TABLET, FILM COATED ORAL NIGHTLY PRN
Status: DISCONTINUED | OUTPATIENT
Start: 2022-11-13 | End: 2022-11-12

## 2022-11-12 RX ORDER — QUETIAPINE FUMARATE 25 MG/1
25 TABLET, FILM COATED ORAL NIGHTLY PRN
Status: DISCONTINUED | OUTPATIENT
Start: 2022-11-12 | End: 2022-11-14

## 2022-11-12 RX ORDER — INSULIN ASPART 100 [IU]/ML
6 INJECTION, SOLUTION INTRAVENOUS; SUBCUTANEOUS
Status: DISCONTINUED | OUTPATIENT
Start: 2022-11-12 | End: 2022-11-14

## 2022-11-12 RX ORDER — ACETYLCYSTEINE 100 MG/ML
4 SOLUTION ORAL; RESPIRATORY (INHALATION) EVERY 6 HOURS
Status: DISCONTINUED | OUTPATIENT
Start: 2022-11-12 | End: 2022-11-14

## 2022-11-12 RX ORDER — MAGNESIUM SULFATE 1 G/100ML
1 INJECTION INTRAVENOUS ONCE
Status: COMPLETED | OUTPATIENT
Start: 2022-11-12 | End: 2022-11-12

## 2022-11-12 RX ORDER — METOPROLOL TARTRATE 1 MG/ML
2.5 INJECTION, SOLUTION INTRAVENOUS ONCE
Status: COMPLETED | OUTPATIENT
Start: 2022-11-12 | End: 2022-11-12

## 2022-11-12 RX ORDER — IPRATROPIUM BROMIDE AND ALBUTEROL SULFATE 2.5; .5 MG/3ML; MG/3ML
3 SOLUTION RESPIRATORY (INHALATION) EVERY 6 HOURS
Status: DISCONTINUED | OUTPATIENT
Start: 2022-11-12 | End: 2022-11-14

## 2022-11-12 RX ORDER — ACETYLCYSTEINE 100 MG/ML
4 SOLUTION ORAL; RESPIRATORY (INHALATION) EVERY 6 HOURS
Status: DISCONTINUED | OUTPATIENT
Start: 2022-11-12 | End: 2022-11-12

## 2022-11-12 RX ORDER — METOPROLOL TARTRATE 1 MG/ML
2.5 INJECTION, SOLUTION INTRAVENOUS ONCE
Status: DISCONTINUED | OUTPATIENT
Start: 2022-11-12 | End: 2022-11-13

## 2022-11-12 RX ADMIN — Medication 3 ML: at 10:11

## 2022-11-12 RX ADMIN — AMANTADINE HYDROCHLORIDE 100 MG: 50 SOLUTION ORAL at 09:11

## 2022-11-12 RX ADMIN — FAMOTIDINE 20 MG: 20 TABLET ORAL at 08:11

## 2022-11-12 RX ADMIN — PIPERACILLIN SODIUM AND TAZOBACTAM SODIUM 4.5 G: 4; .5 INJECTION, POWDER, LYOPHILIZED, FOR SOLUTION INTRAVENOUS at 09:11

## 2022-11-12 RX ADMIN — LISINOPRIL 40 MG: 20 TABLET ORAL at 08:11

## 2022-11-12 RX ADMIN — ACETYLCYSTEINE 4 ML: 100 INHALANT RESPIRATORY (INHALATION) at 11:11

## 2022-11-12 RX ADMIN — AMANTADINE HYDROCHLORIDE 100 MG: 50 SOLUTION ORAL at 05:11

## 2022-11-12 RX ADMIN — INSULIN ASPART 6 UNITS: 100 INJECTION, SOLUTION INTRAVENOUS; SUBCUTANEOUS at 04:11

## 2022-11-12 RX ADMIN — ATORVASTATIN CALCIUM 40 MG: 40 TABLET, FILM COATED ORAL at 08:11

## 2022-11-12 RX ADMIN — Medication 3 ML: at 05:11

## 2022-11-12 RX ADMIN — IPRATROPIUM BROMIDE AND ALBUTEROL SULFATE 3 ML: 2.5; .5 SOLUTION RESPIRATORY (INHALATION) at 11:11

## 2022-11-12 RX ADMIN — AMIODARONE HYDROCHLORIDE 1 MG/MIN: 1.8 INJECTION, SOLUTION INTRAVENOUS at 08:11

## 2022-11-12 RX ADMIN — ACETAMINOPHEN 650 MG: 325 TABLET ORAL at 08:11

## 2022-11-12 RX ADMIN — ACETYLCYSTEINE 4 ML: 100 INHALANT RESPIRATORY (INHALATION) at 07:11

## 2022-11-12 RX ADMIN — INSULIN ASPART 9 UNITS: 100 INJECTION, SOLUTION INTRAVENOUS; SUBCUTANEOUS at 04:11

## 2022-11-12 RX ADMIN — ACETYLCYSTEINE 4 ML: 100 INHALANT RESPIRATORY (INHALATION) at 12:11

## 2022-11-12 RX ADMIN — MAGNESIUM SULFATE HEPTAHYDRATE 1 G: 500 INJECTION, SOLUTION INTRAMUSCULAR; INTRAVENOUS at 08:11

## 2022-11-12 RX ADMIN — AMIODARONE HYDROCHLORIDE 150 MG: 1.5 INJECTION, SOLUTION INTRAVENOUS at 08:11

## 2022-11-12 RX ADMIN — IPRATROPIUM BROMIDE AND ALBUTEROL SULFATE 3 ML: 2.5; .5 SOLUTION RESPIRATORY (INHALATION) at 12:11

## 2022-11-12 RX ADMIN — IPRATROPIUM BROMIDE AND ALBUTEROL SULFATE 3 ML: 2.5; .5 SOLUTION RESPIRATORY (INHALATION) at 07:11

## 2022-11-12 RX ADMIN — SODIUM CHLORIDE 250 ML: 0.9 INJECTION, SOLUTION INTRAVENOUS at 08:11

## 2022-11-12 RX ADMIN — ACETAMINOPHEN 650 MG: 325 TABLET ORAL at 05:11

## 2022-11-12 RX ADMIN — INSULIN ASPART 9 UNITS: 100 INJECTION, SOLUTION INTRAVENOUS; SUBCUTANEOUS at 12:11

## 2022-11-12 RX ADMIN — FENTANYL CITRATE 25 MCG: 0.05 INJECTION, SOLUTION INTRAMUSCULAR; INTRAVENOUS at 10:11

## 2022-11-12 RX ADMIN — METOROPROLOL TARTRATE 2.5 MG: 5 INJECTION, SOLUTION INTRAVENOUS at 08:11

## 2022-11-12 RX ADMIN — METOROPROLOL TARTRATE 5 MG: 5 INJECTION, SOLUTION INTRAVENOUS at 04:11

## 2022-11-12 RX ADMIN — PIPERACILLIN SODIUM AND TAZOBACTAM SODIUM 4.5 G: 4; .5 INJECTION, POWDER, LYOPHILIZED, FOR SOLUTION INTRAVENOUS at 05:11

## 2022-11-12 RX ADMIN — Medication 3 ML: at 01:11

## 2022-11-12 RX ADMIN — DEXTROSE 125 ML: 10 SOLUTION INTRAVENOUS at 12:11

## 2022-11-12 RX ADMIN — METOPROLOL TARTRATE 50 MG: 50 TABLET, FILM COATED ORAL at 05:11

## 2022-11-12 RX ADMIN — METOPROLOL TARTRATE 50 MG: 50 TABLET, FILM COATED ORAL at 01:11

## 2022-11-12 RX ADMIN — HEPARIN SODIUM 5000 UNITS: 5000 INJECTION INTRAVENOUS; SUBCUTANEOUS at 09:11

## 2022-11-12 RX ADMIN — PIPERACILLIN SODIUM AND TAZOBACTAM SODIUM 4.5 G: 4; .5 INJECTION, POWDER, LYOPHILIZED, FOR SOLUTION INTRAVENOUS at 01:11

## 2022-11-12 RX ADMIN — INSULIN ASPART 9 UNITS: 100 INJECTION, SOLUTION INTRAVENOUS; SUBCUTANEOUS at 08:11

## 2022-11-12 RX ADMIN — HEPARIN SODIUM 5000 UNITS: 5000 INJECTION INTRAVENOUS; SUBCUTANEOUS at 01:11

## 2022-11-12 RX ADMIN — HEPARIN SODIUM 5000 UNITS: 5000 INJECTION INTRAVENOUS; SUBCUTANEOUS at 05:11

## 2022-11-12 NOTE — PROGRESS NOTES
[facility] - Neuro Critical Care  Neurocritical Care  Progress Note    Admit Date: 11/5/2022  Service Date: 11/12/2022  Length of Stay: 7    Subjective:     Chief Complaint: Cerebrovascular accident (CVA) due to embolism of left middle cerebral artery    History of Present Illness: Mr. Luis Sorto Jr. is a 76 y.o. male with PMHx of AFib (on Eliquis),CAD, HTN, DM II, s/p LAAO w/Amulet procedure on 11/04/2022 who presents as a transfer from [facility] to Neuro Critical Care s/p [facility] for evaluation of L MCA stroke w/ICH. HPI information gathered from review of the patient's medical record due to the patient being intubated, no family at the bedside.  Patient was LKN around 2000.  He had been discharged home s/p LAAO w/Amulet device on 11/4/2022 around 1000 am.  He acutely developed confusion with difficulty walking and standing, difficulty conversing, and difficulty finding his bathroom.  There were no reported preceding symptoms and nothing was reported to exacerbate or alleviate the symptoms. He presented to [facility] ED where a CTH was obtained and was negative for acute findings.  Telestroke consult was performed by . TNKase was administered as the patient had been off of Eliquis >24h .  Of note, per the ED record patient was noted to have increased pain and swelling in the right groin in the area of the venous access from his Cardiology procedure and then the patient began to have bleeding and bruising in the anterior thigh on he right side. Patient became hemodynamically unstable and was started on Levophed. Outside Hospital Cardiology deparment placed an IV in the left groin for access. Patient was then stabilized. CT Head was ordered and showed Left ICH. Patient was intubated at outside hospital. Cryo was ordered at the outside hospital but not given. Patient was then transferred to [facility] Neuro [facility] for a higher level of care. On arrival to [facility] the  patient is intubated on fentanyl and levophed, NIH 22, hemodynamically unstable, with swelling to the Right groin, unresponsive, Cryo re-ordered and CT Head STAT. Patient will be admitted to Neuro ICU for a higher level of care.     Hospital Course: 11/06/2022: Patient is now s/p R pseudoaneurysm repair. Patient with slightly worsening ICH overnight, exam unchanged. Some oozing from the R groin site, however improved after holding pressure and applying a sandbag. Will consider FFP if continues to oozy from groin site. Patient off pressors, fluids increased. TYSON pending due to Echo findings and concern for possible thrombus.Tube feedings started.  11/07/2022: TYSON shows no thrombus and improved EF. Weaning sedation as tolerated. Discontinue venous femoral line. Restart tube feedings post TYSON.  11/08/2022: NAEON. Weaning sedation as tolerated. Transitioning from fentanyl gtt to precedex gtt. Oozing from cath site resolved. Plan for pressure support trial this afternoon. Transitioned from insulin gtt to subq insulin.  11/09/2022: NAEON. Tolerated SBT. Ceftriaxone started for UTI.   11/10/2022: NAEON. Amantadine started. GOC conversation had with son. Continuing SBT. Remains intermittently febrile with improving leukocytosis. Respiratory culture pending. US of the extremities pending to rule out DVT. ETT advanced.   11/11/2022: gram negative in resp cx- start Zosyn, updated patient's wife at bedside, GOC discussed today and Patient's wife will have a decision on Monday 11/12/2022: NAEON. Sputum growing GNR, abx broadened. CT abd/pelvis shows stable R groin pseudoaneurysm repair with surrounding edema and new finding of metastatic bone lesions likely secondary to prostate CA given enlarged prostate and elevated PSA. Discussed findings with wife at the bedside. Pulmonary toileting started, SBT today.    Review of Systems: Unable to obtain a complete ROS due to level of consciousness.     Vitals:   Temp: 99.5 °F (37.5  °C)  Pulse: (!) 122  Rhythm: atrial rhythm  BP: (!) 149/76  MAP (mmHg): 106  Resp: 13  SpO2: 98 %  Oxygen Concentration (%): 30  O2 Device (Oxygen Therapy): ventilator  Vent Mode: SIMV  Set Rate: 14 BPM  Vt Set: 500 mL  Pressure Support: 5 cmH20  PEEP/CPAP: 5 cmH20  Peak Airway Pressure: 13 cmH2O  Mean Airway Pressure: 9.1 cmH20  Plateau Pressure: 0 cmH20    Temp  Min: 99.3 °F (37.4 °C)  Max: 101.4 °F (38.6 °C)  Pulse  Min: 90  Max: 132  BP  Min: 126/48  Max: 162/92  MAP (mmHg)  Min: 78  Max: 110  Resp  Min: 5  Max: 30  SpO2  Min: 94 %  Max: 100 %  Oxygen Concentration (%)  Min: 30  Max: 30    11/11 0701 - 11/12 0700  In: 1136.2 [I.V.:92.7]  Out: 1245 [Urine:1245]         Examination:   Constitutional: Well-nourished and -developed. No apparent distress.   Eyes: Conjunctiva clear, anicteric. Lids no lesions.  Head/Ears/Nose/Mouth/Throat/Neck: Moist mucous membranes. External ears, nose atraumatic.   Cardiovascular: Regular rhythm. No leg edema.  Respiratory: Comfortable respirations. Clear to auscultation.  Gastrointestinal: Soft, nondistended, nontender. + bowel sounds.    Neurologic:  -GCS E 3 V 1t M 5  -Opens eyes to voice. Does not track. Grimaces to noxious stimuli. Unable to follow commands.  -Cranial nerves: EOM intact, PERRL , no facial droop, + cough  -Motor: LUE localizes to pain, LLE withdraws to pain, R side withdraws to pain  -Sensation: Intact to light touch  Unable to test orientation, language, memory, judgment, insight, fund of knowledge, shoulder shrug, tongue protrusion, coordination, gait due to level of consciousness.    Medications:   Continuousdextrose 10 % in water (D10W)    Scheduledacetylcysteine 100 mg/ml (10%), 4 mL, Q6H  albuterol-ipratropium, 3 mL, Q6H  amantadine HCL, 100 mg, BID  atorvastatin, 40 mg, Daily  famotidine, 20 mg, BID  heparin (porcine), 5,000 Units, Q8H  insulin aspart U-100, 6 Units, 6 times per day  lisinopriL, 40 mg, Daily  metoprolol tartrate, 50 mg,  Q8H  piperacillin-tazobactam (ZOSYN) IVPB, 4.5 g, Q8H  sodium chloride 0.9%, 3 mL, Q8H    PRNacetaminophen, 650 mg, Q6H PRN  bisacodyL, 10 mg, Daily PRN  dextrose 10 % in water (D10W), , Continuous PRN  dextrose 10%, 12.5 g, PRN  dextrose 10%, 25 g, PRN  fentaNYL, 25 mcg, Q1H PRN  glucagon (human recombinant), 1 mg, PRN  hydrALAZINE, 10 mg, Q6H PRN  insulin aspart U-100, 1-10 Units, Q4H PRN  labetalol, 10 mg, Q4H PRN  magnesium oxide, 800 mg, PRN  magnesium oxide, 800 mg, PRN  ondansetron, 4 mg, Q6H PRN  potassium bicarbonate, 35 mEq, PRN  potassium bicarbonate, 50 mEq, PRN  potassium bicarbonate, 60 mEq, PRN  potassium, sodium phosphates, 2 packet, PRN  potassium, sodium phosphates, 2 packet, PRN  potassium, sodium phosphates, 2 packet, PRN       Today I independently reviewed pertinent medications, lines/drains/airways, imaging, cardiology results, laboratory results, microbiology results, notably:     ISTAT:   Recent Labs   Lab 11/12/22  0511   PH 7.510*   PCO2 35.0   PO2 91   POCSATURATED 98   HCO3 27.9   BE 5   POCTCO2 29*   SAMPLE ARTERIAL      Chem:   Recent Labs   Lab 11/12/22  0216      K 3.7      CO2 26      BUN 15   CREATININE 0.5   CALCIUM 8.2*   MG 2.4   PHOS 3.3   ANIONGAP 9   PROT 6.2   ALBUMIN 1.9*   BILITOT 1.5*   ALKPHOS 275*   *   ALT 95*     Heme:   Recent Labs   Lab 11/12/22  0216   WBC 10.58   HGB 9.4*   HCT 30.0*        Endo:   Recent Labs   Lab 11/12/22  0845 11/12/22  1206 11/12/22  1322   POCTGLUCOSE 106 69* 113*     Assessment/Plan:     Neuro  * Cerebrovascular accident (CVA) due to embolism of left middle cerebral artery  s/p Tenecteplase with new Left ICH   - Continue to hold ASA  - Continue subq heparin    Left-sided nontraumatic intracerebral hemorrhage   stroke s/p Tenecteplase with new Left ICH   -- AFib (on Eliquis)  -- s/p LAAO w/Amulet procedure on 11/04/2022 s/p TNKase  -- Continue Neuro checks q 1hr  -- Vascular Neurology consulted  --  Neurosurgery consulted  -- SBP goal <160   -- PT/OT/Speech  -- Atorvastatin 40 mg  -- Slight increase in size of bleed overnight 11/5, no change in exam  -- No surgical intervention at this time per NSGY team   -- Ongoing goals of care discussions with patient's wife at bedside. Patient's wife stated she will have an answer on Monday  -- Stable exam and imaging    Pulmonary  On mechanically assisted ventilation  Vent Mode: SIMV  Oxygen Concentration (%):  [30] 30  Resp Rate Total:  [14 br/min-26 br/min] 25 br/min  Vt Set:  [500 mL] 500 mL  PEEP/CPAP:  [5 cmH20] 5 cmH20  Pressure Support:  [5 cmH20] 5 cmH20  Mean Airway Pressure:  [8.1 cmH20-9.8 cmH20] 9.1 cmH20     Daily CXR and abg  Respiratory culture growing gram neg rods  Abx broadened to Zosyn      Cardiac/Vascular  Acute systolic heart failure  Echo shows:   The following segments are hypokinetic: mid inferoseptal, apical septal and apex. Small dyskinetic apical cap with no thrombus seen. All other segments are normal.   The estimated ejection fraction is 45%.   The left ventricle is normal in size with concentric remodeling and mildly decreased systolic function.   Left ventricular diastolic dysfunction.   Normal right ventricular size with normal right ventricular systolic function.   Severe left atrial enlargement.   The estimated PA systolic pressure is 28 mmHg.   Normal central venous pressure (3 mmHg).    TYSON 11/7 shows no thrombus, improved EF of 55%    Pseudoaneurysm of right femoral artery  S/p repair 11/5  Vascular surgery following, appreciate recs  CT abd/pelvis, stable repair with surrounding edema    Hyperlipidemia  -- Lipid panel  -- Atorvastatin 40 mg daily    Persistent atrial fibrillation  -- AFib (on Eliquis), stopped prior to procedure LAAO w/Amulet procedure on 11/04/2022   -- Goal K > 4, Mg > 2   -- Continue metoprolol    Oncology  Malignant neoplasm metastatic to bone  Likely 2/2 Prostate CA    CT abd/pelvis shows:  New  heterogeneity of the visualized bones with multiple large sclerotic lesions, worrisome for osseous metastatic disease.  New large Schmorl's node or pathologic fracture involving the superior endplate of L1  As well as enlarged prostate    PSA level 33.4    New findings discussed with wife at bedside on 11/12    Endocrine  Diabetes mellitus  Diabetes Mellitus Type II  -- Hyperglycemic in the 300s on admit  -- HgbA1c 6.1  -- Insulin gtt dc'd 11/8, transitioned to subq insulin with SSI  -- Scheduled aspart decreased due to hypoglycemia  -- SSI  -- Accuchecks  -- Diabetic tube feeds    GI  Oral phase dysphagia  Intubated  OGT in place for nutrition and meds  Cont tube feedings    Groin swelling  -- s/p LAAO w/Amulet procedure on 11/04/2022  s/p TNKase  -- Stable s/p pseudoaneurysm repair        The patient is being Prophylaxed for:  Venous Thromboembolism with: Mechanical or Chemical  Stress Ulcer with: H2B  Ventilator Pneumonia with: chlorhexidine oral care    Activity Orders          Turn patient starting at 11/05 1258    Elevate HOB starting at 11/05 0623    Diet NPO: NPO starting at 11/05 0623        Full Code     Critical condition in that Patient has a condition that poses threat to life and bodily function: Weaning of mechanical ventilation, ICH, metastatic disease     35 minutes of Critical care time was spent personally by me on the following activities: development of treatment plan with patient or surrogate and bedside caregivers, discussions with consultants, evaluation of patient's response to treatment, examination of patient, ordering and performing treatments and interventions, ordering and review of laboratory studies, ordering and review of radiographic studies, pulse oximetry, antibiotic titration if applicable, vasopressor titration if applicable, re-evaluation of patient's condition. This critical care time did not overlap with that of any other provider or involve time for any procedures. There is  high probability for acute neurological change leading to clinical and possibly life-threatening deterioration requiring highest level of physician preparedness for urgent intervention.    Naomie Lewis NP  Neurocritical Care  [facility] - Neuro Critical Care

## 2022-11-12 NOTE — PLAN OF CARE
[facility] Care Plan    POC reviewed with Luis Sorto Jr. and family at 1400. Pt verbalized understanding. Questions and concerns addressed. No acute events today. Pt progressing toward goals. Will continue to monitor. See below and flowsheets for full assessment and VS info.     Wife remains at bedside. Patient remains unresponsive, withdraws to pain RUE, RLE, and LLE. Purposeful with LUE. [place] Bms, prep held. Episode of hypoglycemia, CBG 69. Treated with D10W with appropriate response. Team discussed new findings on CT with wife.          Is this a stroke patient? yes- Stroke booklet reviewed with patient, risk factors identified for patient and stroke booklet remains at bedside for ongoing education.     Neuro:  [place] Coma Scale  Best Eye Response: 2-->(E2) to pain  Best Motor Response: 4-->(M4) withdraws from pain  Best Verbal Response: 1-->(V1) none  [place] Coma Scale Score: 7  Assessment Qualifiers: patient intubated, patient chemically sedated or paralyzed  Pupil PERRLA: yes     24 hr Temp:  [99.3 °F (37.4 °C)-101.4 °F (38.6 °C)]     CV:   Rhythm: atrial rhythm  BP goals:   SBP < 160  MAP > 65    Resp:   O2 Device (Oxygen Therapy): ventilator  Vent Mode: SIMV  Set Rate: 14 BPM  Oxygen Concentration (%): 30  Vt Set: 500 mL  PEEP/CPAP: 5 cmH20  Pressure Support: 5 cmH20    Plan: wean to extubate    GI/:     Diet/Nutrition Received: tube feeding  Last Bowel Movement: 11/12/22  Voiding Characteristics: urethral catheter (bladder)    Intake/Output Summary (Last 24 hours) at 11/12/2022 1547  Last data filed at 11/12/2022 1301  Gross per 24 hour   Intake 1236.17 ml   Output 945 ml   Net 291.17 ml          Labs/Accuchecks:  Recent Labs   Lab 11/12/22 0216   WBC 10.58   RBC 3.10*   HGB 9.4*   HCT 30.0*         Recent Labs   Lab 11/12/22 0216      K 3.7   CO2 26      BUN 15   CREATININE 0.5   ALKPHOS 275*   ALT 95*   *   BILITOT 1.5*      Recent Labs   Lab 11/10/22  0212   INR 1.2   APTT  24.5      Recent Labs   Lab 11/06/22  0003   TROPONINI 0.053*       Electrolytes: N/A - electrolytes WDL  Accuchecks: Q4H    Gtts:   dextrose 10 % in water (D10W)         LDA/Wounds:  Lines/Drains/Airways       Drain  Duration                  NG/OG Tube 11/05/22 0333 orogastric 18 Fr. Center mouth 7 days         Urethral Catheter 11/05/22 0630 Non-latex 16 Fr. 7 days              Airway  Duration                  Airway - Non-Surgical 11/05/22 0328 7 days              Peripheral Intravenous Line  Duration                  Peripheral IV - Single Lumen 11/05/22 20 G Anterior;Right Upper Arm 7 days         Peripheral IV - Single Lumen 11/09/22 18 G Anterior;Distal;Left Upper Arm 3 days         Peripheral IV - Single Lumen 11/09/22 20 G Anterior;Left Forearm 3 days                  Wounds: Yes  Wound care consulted: No

## 2022-11-12 NOTE — NURSING
Turning patient to clean BM, noted both NEHA drains to R inguinal were out. The tips were clean without tissue or blood residue. The drains were not holding air prior. Sutures intact, no drainage at site. Team notified, Allyson to bedside to evaluate.    Suctioning tube feed from ETT. Feeding held, Allyson order CXR/KUB to determine placement. Tube at 60 cm/lip since beginning of shift.

## 2022-11-12 NOTE — ASSESSMENT & PLAN NOTE
-- AFib (on Eliquis), stopped prior to procedure LAAO w/Amulet procedure on 11/04/2022   -- Goal K > 4, Mg > 2   -- Continue metoprolol

## 2022-11-12 NOTE — SUBJECTIVE & OBJECTIVE
Review of Systems: Unable to obtain a complete ROS due to level of consciousness.     Vitals:   Temp: 99.5 °F (37.5 °C)  Pulse: (!) 122  Rhythm: atrial rhythm  BP: (!) 149/76  MAP (mmHg): 106  Resp: 13  SpO2: 98 %  Oxygen Concentration (%): 30  O2 Device (Oxygen Therapy): ventilator  Vent Mode: SIMV  Set Rate: 14 BPM  Vt Set: 500 mL  Pressure Support: 5 cmH20  PEEP/CPAP: 5 cmH20  Peak Airway Pressure: 13 cmH2O  Mean Airway Pressure: 9.1 cmH20  Plateau Pressure: 0 cmH20    Temp  Min: 99.3 °F (37.4 °C)  Max: 101.4 °F (38.6 °C)  Pulse  Min: 90  Max: 132  BP  Min: 126/48  Max: 162/92  MAP (mmHg)  Min: 78  Max: 110  Resp  Min: 5  Max: 30  SpO2  Min: 94 %  Max: 100 %  Oxygen Concentration (%)  Min: 30  Max: 30    11/11 0701 - 11/12 0700  In: 1136.2 [I.V.:92.7]  Out: 1245 [Urine:1245]         Examination:   Constitutional: Well-nourished and -developed. No apparent distress.   Eyes: Conjunctiva clear, anicteric. Lids no lesions.  Head/Ears/Nose/Mouth/Throat/Neck: Moist mucous membranes. External ears, nose atraumatic.   Cardiovascular: Regular rhythm. No leg edema.  Respiratory: Comfortable respirations. Clear to auscultation.  Gastrointestinal: Soft, nondistended, nontender. + bowel sounds.    Neurologic:  -GCS E 3 V 1t M 5  -Opens eyes to voice. Does not track. Grimaces to noxious stimuli. Unable to follow commands.  -Cranial nerves: EOM intact, PERRL , no facial droop, + cough  -Motor: LUE localizes to pain, LLE withdraws to pain, R side withdraws to pain  -Sensation: Intact to light touch  Unable to test orientation, language, memory, judgment, insight, fund of knowledge, shoulder shrug, tongue protrusion, coordination, gait due to level of consciousness.    Medications:   Continuousdextrose 10 % in water (D10W)    Scheduledacetylcysteine 100 mg/ml (10%), 4 mL, Q6H  albuterol-ipratropium, 3 mL, Q6H  amantadine HCL, 100 mg, BID  atorvastatin, 40 mg, Daily  famotidine, 20 mg, BID  heparin (porcine), 5,000 Units,  Q8H  insulin aspart U-100, 6 Units, 6 times per day  lisinopriL, 40 mg, Daily  metoprolol tartrate, 50 mg, Q8H  piperacillin-tazobactam (ZOSYN) IVPB, 4.5 g, Q8H  sodium chloride 0.9%, 3 mL, Q8H    PRNacetaminophen, 650 mg, Q6H PRN  bisacodyL, 10 mg, Daily PRN  dextrose 10 % in water (D10W), , Continuous PRN  dextrose 10%, 12.5 g, PRN  dextrose 10%, 25 g, PRN  fentaNYL, 25 mcg, Q1H PRN  glucagon (human recombinant), 1 mg, PRN  hydrALAZINE, 10 mg, Q6H PRN  insulin aspart U-100, 1-10 Units, Q4H PRN  labetalol, 10 mg, Q4H PRN  magnesium oxide, 800 mg, PRN  magnesium oxide, 800 mg, PRN  ondansetron, 4 mg, Q6H PRN  potassium bicarbonate, 35 mEq, PRN  potassium bicarbonate, 50 mEq, PRN  potassium bicarbonate, 60 mEq, PRN  potassium, sodium phosphates, 2 packet, PRN  potassium, sodium phosphates, 2 packet, PRN  potassium, sodium phosphates, 2 packet, PRN       Today I independently reviewed pertinent medications, lines/drains/airways, imaging, cardiology results, laboratory results, microbiology results, notably:     ISTAT:   Recent Labs   Lab 11/12/22  0511   PH 7.510*   PCO2 35.0   PO2 91   POCSATURATED 98   HCO3 27.9   BE 5   POCTCO2 29*   SAMPLE ARTERIAL      Chem:   Recent Labs   Lab 11/12/22 0216      K 3.7      CO2 26      BUN 15   CREATININE 0.5   CALCIUM 8.2*   MG 2.4   PHOS 3.3   ANIONGAP 9   PROT 6.2   ALBUMIN 1.9*   BILITOT 1.5*   ALKPHOS 275*   *   ALT 95*     Heme:   Recent Labs   Lab 11/12/22 0216   WBC 10.58   HGB 9.4*   HCT 30.0*        Endo:   Recent Labs   Lab 11/12/22  0845 11/12/22  1206 11/12/22  1322   POCTGLUCOSE 106 69* 113*

## 2022-11-12 NOTE — ASSESSMENT & PLAN NOTE
Vent Mode: SIMV  Oxygen Concentration (%):  [30] 30  Resp Rate Total:  [14 br/min-26 br/min] 25 br/min  Vt Set:  [500 mL] 500 mL  PEEP/CPAP:  [5 cmH20] 5 cmH20  Pressure Support:  [5 cmH20] 5 cmH20  Mean Airway Pressure:  [8.1 cmH20-9.8 cmH20] 9.1 cmH20     Daily CXR and abg  Respiratory culture growing gram neg rods  Abx broadened to Zosyn

## 2022-11-12 NOTE — ASSESSMENT & PLAN NOTE
Diabetes Mellitus Type II  -- Hyperglycemic in the 300s on admit  -- HgbA1c 6.1  -- Insulin gtt dc'd 11/8, transitioned to subq insulin with SSI  -- Scheduled aspart decreased due to hypoglycemia  -- SSI  -- Accuchecks  -- Diabetic tube feeds

## 2022-11-12 NOTE — ASSESSMENT & PLAN NOTE
S/p repair 11/5  Vascular surgery following, appreciate recs  CT abd/pelvis, stable repair with surrounding edema

## 2022-11-12 NOTE — ASSESSMENT & PLAN NOTE
stroke s/p Tenecteplase with new Left ICH   -- AFib (on Eliquis)  -- s/p LAAO w/Amulet procedure on 11/04/2022 s/p TNKase  -- Continue Neuro checks q 1hr  -- Vascular Neurology consulted  -- Neurosurgery consulted  -- SBP goal <160   -- PT/OT/Speech  -- Atorvastatin 40 mg  -- Slight increase in size of bleed overnight 11/5, no change in exam  -- No surgical intervention at this time per NSGY team   -- Ongoing goals of care discussions with patient's wife at bedside. Patient's wife stated she will have an answer on Monday  -- Stable exam and imaging

## 2022-11-12 NOTE — ASSESSMENT & PLAN NOTE
Likely 2/2 Prostate CA    CT abd/pelvis shows:  New heterogeneity of the visualized bones with multiple large sclerotic lesions, worrisome for osseous metastatic disease.  New large Schmorl's node or pathologic fracture involving the superior endplate of L1  As well as enlarged prostate    PSA level 33.4    New findings discussed with wife at bedside on 11/12

## 2022-11-13 LAB
ALBUMIN SERPL BCP-MCNC: 1.8 G/DL (ref 3.5–5.2)
ALLENS TEST: ABNORMAL
ALP SERPL-CCNC: 256 U/L (ref 55–135)
ALT SERPL W/O P-5'-P-CCNC: 78 U/L (ref 10–44)
ANION GAP SERPL CALC-SCNC: 11 MMOL/L (ref 8–16)
AST SERPL-CCNC: 71 U/L (ref 10–40)
BASOPHILS # BLD AUTO: 0.09 K/UL (ref 0–0.2)
BASOPHILS NFR BLD: 0.7 % (ref 0–1.9)
BILIRUB SERPL-MCNC: 1.3 MG/DL (ref 0.1–1)
BUN SERPL-MCNC: 13 MG/DL (ref 8–23)
CALCIUM SERPL-MCNC: 7.9 MG/DL (ref 8.7–10.5)
CHLORIDE SERPL-SCNC: 103 MMOL/L (ref 95–110)
CO2 SERPL-SCNC: 26 MMOL/L (ref 23–29)
CREAT SERPL-MCNC: 0.6 MG/DL (ref 0.5–1.4)
DELSYS: ABNORMAL
DIFFERENTIAL METHOD: ABNORMAL
EOSINOPHIL # BLD AUTO: 0.3 K/UL (ref 0–0.5)
EOSINOPHIL NFR BLD: 2.7 % (ref 0–8)
ERYTHROCYTE [DISTWIDTH] IN BLOOD BY AUTOMATED COUNT: 15.2 % (ref 11.5–14.5)
ERYTHROCYTE [SEDIMENTATION RATE] IN BLOOD BY WESTERGREN METHOD: 21 MM/H
EST. GFR  (NO RACE VARIABLE): >60 ML/MIN/1.73 M^2
GLUCOSE SERPL-MCNC: 173 MG/DL (ref 70–110)
HCO3 UR-SCNC: 26.7 MMOL/L (ref 24–28)
HCT VFR BLD AUTO: 27.6 % (ref 40–54)
HGB BLD-MCNC: 8.7 G/DL (ref 14–18)
IMM GRANULOCYTES # BLD AUTO: 0.12 K/UL (ref 0–0.04)
IMM GRANULOCYTES NFR BLD AUTO: 1 % (ref 0–0.5)
LYMPHOCYTES # BLD AUTO: 1.7 K/UL (ref 1–4.8)
LYMPHOCYTES NFR BLD: 13.9 % (ref 18–48)
MAGNESIUM SERPL-MCNC: 2.4 MG/DL (ref 1.6–2.6)
MCH RBC QN AUTO: 30.6 PG (ref 27–31)
MCHC RBC AUTO-ENTMCNC: 31.5 G/DL (ref 32–36)
MCV RBC AUTO: 97 FL (ref 82–98)
MODE: ABNORMAL
MONOCYTES # BLD AUTO: 1.1 K/UL (ref 0.3–1)
MONOCYTES NFR BLD: 9.1 % (ref 4–15)
NEUTROPHILS # BLD AUTO: 8.8 K/UL (ref 1.8–7.7)
NEUTROPHILS NFR BLD: 72.6 % (ref 38–73)
NRBC BLD-RTO: 0 /100 WBC
PCO2 BLDA: 36.2 MMHG (ref 35–45)
PEEP: 5
PH SMN: 7.47 [PH] (ref 7.35–7.45)
PHOSPHATE SERPL-MCNC: 2.7 MG/DL (ref 2.7–4.5)
PLATELET # BLD AUTO: 327 K/UL (ref 150–450)
PMV BLD AUTO: 8.9 FL (ref 9.2–12.9)
PO2 BLDA: 116 MMHG (ref 80–100)
POC BE: 3 MMOL/L
POC SATURATED O2: 99 % (ref 95–100)
POC TCO2: 28 MMOL/L (ref 23–27)
POCT GLUCOSE: 133 MG/DL (ref 70–110)
POCT GLUCOSE: 140 MG/DL (ref 70–110)
POCT GLUCOSE: 160 MG/DL (ref 70–110)
POCT GLUCOSE: 162 MG/DL (ref 70–110)
POCT GLUCOSE: 165 MG/DL (ref 70–110)
POCT GLUCOSE: 181 MG/DL (ref 70–110)
POTASSIUM SERPL-SCNC: 3.9 MMOL/L (ref 3.5–5.1)
PROT SERPL-MCNC: 5.9 G/DL (ref 6–8.4)
PS: 5
RBC # BLD AUTO: 2.84 M/UL (ref 4.6–6.2)
SAMPLE: ABNORMAL
SITE: ABNORMAL
SODIUM SERPL-SCNC: 140 MMOL/L (ref 136–145)
VT: 500
WBC # BLD AUTO: 12.12 K/UL (ref 3.9–12.7)

## 2022-11-13 PROCEDURE — 82800 BLOOD PH: CPT

## 2022-11-13 PROCEDURE — 25000003 PHARM REV CODE 250: Performed by: NURSE PRACTITIONER

## 2022-11-13 PROCEDURE — 25000003 PHARM REV CODE 250

## 2022-11-13 PROCEDURE — 99291 PR CRITICAL CARE, E/M 30-74 MINUTES: ICD-10-PCS | Mod: ,,, | Performed by: PSYCHIATRY & NEUROLOGY

## 2022-11-13 PROCEDURE — 63600175 PHARM REV CODE 636 W HCPCS

## 2022-11-13 PROCEDURE — 36600 WITHDRAWAL OF ARTERIAL BLOOD: CPT

## 2022-11-13 PROCEDURE — 80053 COMPREHEN METABOLIC PANEL: CPT | Performed by: PHYSICIAN ASSISTANT

## 2022-11-13 PROCEDURE — 27100171 HC OXYGEN HIGH FLOW UP TO 24 HOURS

## 2022-11-13 PROCEDURE — 94761 N-INVAS EAR/PLS OXIMETRY MLT: CPT

## 2022-11-13 PROCEDURE — 63600175 PHARM REV CODE 636 W HCPCS: Performed by: NURSE PRACTITIONER

## 2022-11-13 PROCEDURE — 27000221 HC OXYGEN, UP TO 24 HOURS

## 2022-11-13 PROCEDURE — 20000000 HC ICU ROOM

## 2022-11-13 PROCEDURE — 99900026 HC AIRWAY MAINTENANCE (STAT)

## 2022-11-13 PROCEDURE — 94003 VENT MGMT INPAT SUBQ DAY: CPT

## 2022-11-13 PROCEDURE — 85025 COMPLETE CBC W/AUTO DIFF WBC: CPT | Performed by: PHYSICIAN ASSISTANT

## 2022-11-13 PROCEDURE — 25000242 PHARM REV CODE 250 ALT 637 W/ HCPCS: Performed by: PSYCHIATRY & NEUROLOGY

## 2022-11-13 PROCEDURE — A4216 STERILE WATER/SALINE, 10 ML: HCPCS | Performed by: PHYSICIAN ASSISTANT

## 2022-11-13 PROCEDURE — 63600175 PHARM REV CODE 636 W HCPCS: Performed by: PHYSICIAN ASSISTANT

## 2022-11-13 PROCEDURE — 94668 MNPJ CHEST WALL SBSQ: CPT

## 2022-11-13 PROCEDURE — 25000003 PHARM REV CODE 250: Performed by: PHYSICIAN ASSISTANT

## 2022-11-13 PROCEDURE — 82803 BLOOD GASES ANY COMBINATION: CPT

## 2022-11-13 PROCEDURE — 99291 CRITICAL CARE FIRST HOUR: CPT | Mod: ,,, | Performed by: PSYCHIATRY & NEUROLOGY

## 2022-11-13 PROCEDURE — 97112 NEUROMUSCULAR REEDUCATION: CPT

## 2022-11-13 PROCEDURE — 27200966 HC CLOSED SUCTION SYSTEM

## 2022-11-13 PROCEDURE — 83735 ASSAY OF MAGNESIUM: CPT | Performed by: PHYSICIAN ASSISTANT

## 2022-11-13 PROCEDURE — 94640 AIRWAY INHALATION TREATMENT: CPT

## 2022-11-13 PROCEDURE — 99900035 HC TECH TIME PER 15 MIN (STAT)

## 2022-11-13 PROCEDURE — 25000242 PHARM REV CODE 250 ALT 637 W/ HCPCS: Performed by: NURSE PRACTITIONER

## 2022-11-13 PROCEDURE — 84100 ASSAY OF PHOSPHORUS: CPT | Performed by: PHYSICIAN ASSISTANT

## 2022-11-13 RX ORDER — OXYCODONE HYDROCHLORIDE 5 MG/1
5 TABLET ORAL EVERY 6 HOURS PRN
Status: DISCONTINUED | OUTPATIENT
Start: 2022-11-13 | End: 2022-11-13

## 2022-11-13 RX ORDER — METOPROLOL TARTRATE 50 MG/1
50 TABLET ORAL EVERY 6 HOURS
Status: DISCONTINUED | OUTPATIENT
Start: 2022-11-13 | End: 2022-11-14

## 2022-11-13 RX ORDER — OXYCODONE HYDROCHLORIDE 5 MG/1
5 TABLET ORAL EVERY 6 HOURS
Status: DISCONTINUED | OUTPATIENT
Start: 2022-11-14 | End: 2022-11-13

## 2022-11-13 RX ORDER — OXYCODONE HYDROCHLORIDE 5 MG/1
5 TABLET ORAL EVERY 6 HOURS
Status: DISCONTINUED | OUTPATIENT
Start: 2022-11-13 | End: 2022-11-14

## 2022-11-13 RX ORDER — LISINOPRIL 20 MG/1
20 TABLET ORAL DAILY
Status: DISCONTINUED | OUTPATIENT
Start: 2022-11-14 | End: 2022-11-14

## 2022-11-13 RX ADMIN — ACETYLCYSTEINE 4 ML: 100 INHALANT RESPIRATORY (INHALATION) at 01:11

## 2022-11-13 RX ADMIN — ATORVASTATIN CALCIUM 40 MG: 40 TABLET, FILM COATED ORAL at 08:11

## 2022-11-13 RX ADMIN — OXYCODONE 5 MG: 5 TABLET ORAL at 03:11

## 2022-11-13 RX ADMIN — ACETYLCYSTEINE 4 ML: 100 INHALANT RESPIRATORY (INHALATION) at 07:11

## 2022-11-13 RX ADMIN — PIPERACILLIN SODIUM AND TAZOBACTAM SODIUM 4.5 G: 4; .5 INJECTION, POWDER, LYOPHILIZED, FOR SOLUTION INTRAVENOUS at 08:11

## 2022-11-13 RX ADMIN — FAMOTIDINE 20 MG: 20 TABLET ORAL at 08:11

## 2022-11-13 RX ADMIN — AMANTADINE HYDROCHLORIDE 100 MG: 50 SOLUTION ORAL at 10:11

## 2022-11-13 RX ADMIN — PIPERACILLIN SODIUM AND TAZOBACTAM SODIUM 4.5 G: 4; .5 INJECTION, POWDER, LYOPHILIZED, FOR SOLUTION INTRAVENOUS at 06:11

## 2022-11-13 RX ADMIN — INSULIN ASPART 2 UNITS: 100 INJECTION, SOLUTION INTRAVENOUS; SUBCUTANEOUS at 04:11

## 2022-11-13 RX ADMIN — IPRATROPIUM BROMIDE AND ALBUTEROL SULFATE 3 ML: 2.5; .5 SOLUTION RESPIRATORY (INHALATION) at 07:11

## 2022-11-13 RX ADMIN — HEPARIN SODIUM 5000 UNITS: 5000 INJECTION INTRAVENOUS; SUBCUTANEOUS at 06:11

## 2022-11-13 RX ADMIN — FENTANYL CITRATE 25 MCG: 0.05 INJECTION, SOLUTION INTRAMUSCULAR; INTRAVENOUS at 01:11

## 2022-11-13 RX ADMIN — AMANTADINE HYDROCHLORIDE 100 MG: 50 SOLUTION ORAL at 06:11

## 2022-11-13 RX ADMIN — AMIODARONE HYDROCHLORIDE 1 MG/MIN: 1.8 INJECTION, SOLUTION INTRAVENOUS at 02:11

## 2022-11-13 RX ADMIN — INSULIN ASPART 2 UNITS: 100 INJECTION, SOLUTION INTRAVENOUS; SUBCUTANEOUS at 12:11

## 2022-11-13 RX ADMIN — Medication 3 ML: at 06:11

## 2022-11-13 RX ADMIN — PIPERACILLIN SODIUM AND TAZOBACTAM SODIUM 4.5 G: 4; .5 INJECTION, POWDER, LYOPHILIZED, FOR SOLUTION INTRAVENOUS at 12:11

## 2022-11-13 RX ADMIN — OXYCODONE 5 MG: 5 TABLET ORAL at 08:11

## 2022-11-13 RX ADMIN — INSULIN ASPART 6 UNITS: 100 INJECTION, SOLUTION INTRAVENOUS; SUBCUTANEOUS at 12:11

## 2022-11-13 RX ADMIN — METOPROLOL TARTRATE 50 MG: 50 TABLET, FILM COATED ORAL at 06:11

## 2022-11-13 RX ADMIN — INSULIN ASPART 6 UNITS: 100 INJECTION, SOLUTION INTRAVENOUS; SUBCUTANEOUS at 08:11

## 2022-11-13 RX ADMIN — METOPROLOL TARTRATE 50 MG: 50 TABLET, FILM COATED ORAL at 12:11

## 2022-11-13 RX ADMIN — Medication 3 ML: at 01:11

## 2022-11-13 RX ADMIN — QUETIAPINE FUMARATE 25 MG: 25 TABLET ORAL at 12:11

## 2022-11-13 RX ADMIN — AMIODARONE HYDROCHLORIDE 1 MG/MIN: 1.8 INJECTION, SOLUTION INTRAVENOUS at 07:11

## 2022-11-13 RX ADMIN — IPRATROPIUM BROMIDE AND ALBUTEROL SULFATE 3 ML: 2.5; .5 SOLUTION RESPIRATORY (INHALATION) at 01:11

## 2022-11-13 RX ADMIN — HEPARIN SODIUM 5000 UNITS: 5000 INJECTION INTRAVENOUS; SUBCUTANEOUS at 01:11

## 2022-11-13 RX ADMIN — LISINOPRIL 40 MG: 20 TABLET ORAL at 08:11

## 2022-11-13 RX ADMIN — QUETIAPINE FUMARATE 25 MG: 25 TABLET ORAL at 08:11

## 2022-11-13 RX ADMIN — HEPARIN SODIUM 5000 UNITS: 5000 INJECTION INTRAVENOUS; SUBCUTANEOUS at 10:11

## 2022-11-13 RX ADMIN — ACETAMINOPHEN 650 MG: 325 TABLET ORAL at 08:11

## 2022-11-13 RX ADMIN — INSULIN ASPART 6 UNITS: 100 INJECTION, SOLUTION INTRAVENOUS; SUBCUTANEOUS at 04:11

## 2022-11-13 RX ADMIN — Medication 3 ML: at 10:11

## 2022-11-13 NOTE — PLAN OF CARE
[facility] Care Plan    POC reviewed with Luis Sorto Jr. and family at 1400. Pt verbalized understanding. Questions and concerns addressed. No acute events today. Pt progressing toward goals. Will continue to monitor. See below and flowsheets for full assessment and VS info.   No change in mental/alertness status. Moves LUE to purpose. Rectal tube placed for copious stool. HR remains labile, team restarted metoprolol. Wife at bedside, stroke book there and discussed.              Is this a stroke patient? yes- Stroke booklet reviewed with family, risk factors identified for patient and stroke booklet remains at bedside for ongoing education.     Neuro:  [place] Coma Scale  Best Eye Response: 2-->(E2) to pain  Best Motor Response: 4-->(M4) withdraws from pain  Best Verbal Response: 1-->(V1) none  [place] Coma Scale Score: 7  Assessment Qualifiers: patient intubated, patient chemically sedated or paralyzed  Pupil PERRLA: yes     24 hr Temp:  [99.2 °F (37.3 °C)-100.9 °F (38.3 °C)]     CV:   Rhythm: atrial rhythm  BP goals:   SBP < 160  MAP > 65    Resp:   O2 Device (Oxygen Therapy): ventilator  Vent Mode: Spont  Set Rate: 14 BPM  Oxygen Concentration (%): 30  Vt Set: 500 mL  PEEP/CPAP: 8 cmH20  Pressure Support: 10 cmH20    Plan: N/A    GI/:     Diet/Nutrition Received: other (see comments) (enteral feeding)  Last Bowel Movement: 11/13/22  Voiding Characteristics: voids spontaneously without difficulty, urethral catheter (bladder)    Intake/Output Summary (Last 24 hours) at 11/13/2022 1324  Last data filed at 11/13/2022 0701  Gross per 24 hour   Intake 2046.35 ml   Output 1425 ml   Net 621.35 ml          Labs/Accuchecks:  Recent Labs   Lab 11/13/22  0405   WBC 12.12   RBC 2.84*   HGB 8.7*   HCT 27.6*         Recent Labs   Lab 11/13/22  0405      K 3.9   CO2 26      BUN 13   CREATININE 0.6   ALKPHOS 256*   ALT 78*   AST 71*   BILITOT 1.3*      Recent Labs   Lab 11/10/22  0212   INR 1.2   APTT 24.5     No results for input(s): CPK, CPKMB, TROPONINI, MB in the last 168 hours.    Electrolytes: Electrolytes replaced  Accuchecks: Q4H    Gtts:   amiodarone in dextrose 5% 1 mg/min (11/13/22 0756)    dextrose 10 % in water (D10W)         LDA/Wounds:  Lines/Drains/Airways       Drain  Duration                  NG/OG Tube 11/05/22 0333 orogastric 18 Fr. Center mouth 8 days         Urethral Catheter 11/05/22 0630 Non-latex 16 Fr. 8 days              Airway  Duration                  Airway - Non-Surgical 11/05/22 0328 8 days              Peripheral Intravenous Line  Duration                  Peripheral IV - Single Lumen 11/05/22 20 G Anterior;Right Upper Arm 8 days         Peripheral IV - Single Lumen 11/09/22 18 G Anterior;Distal;Left Upper Arm 4 days         Peripheral IV - Single Lumen 11/09/22 20 G Anterior;Left Forearm 4 days                  Wounds: Yes  Wound care consulted: No

## 2022-11-13 NOTE — PT/OT/SLP PROGRESS
"Occupational Therapy   Treatment    Name: Luis Sorto Jr.  MRN: [id number]  Admitting Diagnosis:  Cerebrovascular accident (CVA) due to embolism of left middle cerebral artery  6 Days Post-Op    Recommendations:     Discharge Recommendations:  (pending extubation)  Discharge Equipment Recommendations:   (pending extubation)  Barriers to discharge:  None    Assessment:     Luis Sorto Jr. is a 76 y.o. male with a medical diagnosis of Cerebrovascular accident (CVA) due to embolism of left middle cerebral artery.  He presents with performance deficits affecting function are visual deficits, weakness, abnormal tone, impaired cognition, impaired endurance, decreased ROM, impaired coordination, decreased coordination, impaired sensation, impaired self care skills, decreased upper extremity function, impaired fine motor, decreased lower extremity function, impaired functional mobility, gait instability, decreased safety awareness, impaired balance, impaired cardiopulmonary response to activity.     Rehab Prognosis:  Good; patient would benefit from acute skilled OT services to address these deficits and reach maximum level of function.       Plan:     Patient to be seen 3 x/week to address the above listed problems via sensory integration, cognitive retraining, neuromuscular re-education, therapeutic exercises, therapeutic activities, self-care/home management  Plan of Care Expires: 12/04/22  Plan of Care Reviewed with: patient, spouse    Subjective   Patient: Nonverbal; intubated  Wife:  "He was raising his legs up.  I found out he has prostate cancer and it progressed to L1; that is likely what was causing his problems walking where he was shuffling."  "Tomorrow, I'll have to make a decision about his plan; I think I know what he would want."  Pain/Comfort:  Pain Rating 1: 0/10  Pain Rating Post-Intervention 1: 0/10    Objective:     Communicated with: Nurse prior to session.  Patient found supine with bed alarm, " ventilator, telemetry, pulse ox (continuous), pressure relief boots, blood pressure cuff, peripheral IV, wound vac, restraints, bhardwaj catheter, SCD upon OT entry to room.    General Precautions: Standard, aspiration, fall, NPO   Orthopedic Precautions:N/A   Braces: N/A  Respiratory Status:  ventilator     Occupational Performance:     Bed Mobility:    Patient completed Rolling/Turning to Left with  total assistance  Patient completed Rolling/Turning to Right with total assistance     Functional Mobility/Transfers:  Dependent drawsheet transfers    Activities of Daily Living:  Feeding:  NPO    Grooming: total assistance while supine    [facility] 6 Click ADL: 6    Treatment & Education:  Patient education provided on role of OT.  Continued education, patient/ family training recommended.  Daily orientation provided.  PROM performed bilateral UE/LEs one set x 10 rep in all planes of motion with stretches provided at end range; sustained stretch provided for ankle dorsiflexion.  Assistance and facilitation provided for upward rotation of the scapula during shoulder flexion and abduction to promote orientation of glenoid fossa of scapula to humeral head for prevention of post-stroke hemiplegic pain.  Positioning provided for midline orientation with bilateral UEs elevated and heels lifted off mattress.    Patient left supine with all lines intact, call button in reach, and bed alarm on    GOALS:   Multidisciplinary Problems       Occupational Therapy Goals          Problem: Occupational Therapy    Goal Priority Disciplines Outcome Interventions   Occupational Therapy Goal     OT, PT/OT Ongoing, Progressing    Description: Goals set 11/8 to be addressed for 14 days with expiration date, 11/22:  Patient will increase functional independence with ADLs by performing:    Patient will demonstrate rolling to the right with max assist.  Not met   Patient will demonstrate rolling to the left with max assist.   Not met  Patient will  demonstrate supine -sit with max  assist.   Not met  Patient will demonstrate stand pivot transfers with max assist.   Not met  Patient will demonstrate grooming while seated with max assist.   Not met  Patient will demonstrate upper body dressing with max assist while seated EOB.   Not met  Patient will demonstrate lower body dressing with max assist while seated EOB.   Not met  Patient will demonstrate toileting with max assist.   Not met  Patient's family / caregiver will demonstrate independence and safety with assisting patient with self-care skills and functional mobility.     Not met  Patient's family / caregiver will demonstrate independence with providing ROM and changes in bed positioning.   Not met  Patient and/or patient's family will verbalize understanding of stroke prevention guidelines, personal risk factors and stroke warning signs via teachback method.  Not met                                  Time Tracking:     OT Date of Treatment: 11/13/22  OT Start Time: 0426  OT Stop Time: 0449  OT Total Time (min): 23 min    Billable Minutes:Neuromuscular Re-education 23    OT/TIFFANY: OT          11/13/2022

## 2022-11-13 NOTE — SUBJECTIVE & OBJECTIVE
Review of Systems: Unable to obtain a complete ROS due to level of consciousness.     Vitals:   Temp: 99.7 °F (37.6 °C)  Pulse: (!) 140  Rhythm: atrial rhythm  BP: (!) 144/67  MAP (mmHg): 94  Resp: 19  SpO2: 95 %  Oxygen Concentration (%): 30  O2 Device (Oxygen Therapy): ventilator  Vent Mode: SIMV  Set Rate: 14 BPM  Vt Set: 500 mL  Pressure Support: 5 cmH20  PEEP/CPAP: 5 cmH20  Peak Airway Pressure: 20 cmH2O  Mean Airway Pressure: 9 cmH20  Plateau Pressure: 0 cmH20    Temp  Min: 99.2 °F (37.3 °C)  Max: 100.9 °F (38.3 °C)  Pulse  Min: 103  Max: 142  BP  Min: 115/57  Max: 156/72  MAP (mmHg)  Min: 77  Max: 106  Resp  Min: 11  Max: 30  SpO2  Min: 95 %  Max: 100 %  Oxygen Concentration (%)  Min: 30  Max: 30    11/12 0701 - 11/13 0700  In: 2016.4 [I.V.:406]  Out: 1725 [Urine:1725]         Examination:   Constitutional: Well-nourished and -developed. No apparent distress.   Eyes: Conjunctiva clear, anicteric. Lids no lesions.  Head/Ears/Nose/Mouth/Throat/Neck: Moist mucous membranes. External ears, nose atraumatic.   Cardiovascular: Regular rhythm. No leg edema.  Respiratory: Comfortable respirations. Clear to auscultation.  Gastrointestinal: Soft, nondistended, nontender. + bowel sounds.    Neurologic:  -GCS E 3 V 1t M 5  -Opens eyes to voice. Does not track. Grimaces to noxious stimuli. Unable to follow commands.  -Cranial nerves: EOM intact, PERRL , no facial droop, + cough  -Motor: LUE localizes to pain, LLE withdraws to pain, RUE extensor posturing, RLE withdraws to pain  -Sensation: Intact to light touch  Unable to test orientation, language, memory, judgment, insight, fund of knowledge, shoulder shrug, tongue protrusion, coordination, gait due to level of consciousness.    Medications:   Continuousamiodarone in dextrose 5%, Last Rate: 1 mg/min (11/13/22 0756)  dextrose 10 % in water (D10W)  Scheduledacetylcysteine 100 mg/ml (10%), 4 mL, Q6H  albuterol-ipratropium, 3 mL, Q6H  amantadine HCL, 100 mg,  BID  atorvastatin, 40 mg, Daily  famotidine, 20 mg, BID  heparin (porcine), 5,000 Units, Q8H  insulin aspart U-100, 6 Units, 6 times per day  lisinopriL, 40 mg, Daily  metoprolol, 2.5 mg, Once  piperacillin-tazobactam (ZOSYN) IVPB, 4.5 g, Q8H  sodium chloride 0.9%, 3 mL, Q8H  PRNacetaminophen, 650 mg, Q6H PRN  bisacodyL, 10 mg, Daily PRN  dextrose 10 % in water (D10W), , Continuous PRN  dextrose 10%, 12.5 g, PRN  dextrose 10%, 25 g, PRN  fentaNYL, 25 mcg, Q1H PRN  glucagon (human recombinant), 1 mg, PRN  hydrALAZINE, 10 mg, Q6H PRN  insulin aspart U-100, 1-10 Units, Q4H PRN  labetalol, 10 mg, Q4H PRN  magnesium oxide, 800 mg, PRN  magnesium oxide, 800 mg, PRN  ondansetron, 4 mg, Q6H PRN  oxyCODONE, 5 mg, Q6H PRN  potassium bicarbonate, 35 mEq, PRN  potassium bicarbonate, 50 mEq, PRN  potassium bicarbonate, 60 mEq, PRN  potassium, sodium phosphates, 2 packet, PRN  potassium, sodium phosphates, 2 packet, PRN  potassium, sodium phosphates, 2 packet, PRN  QUEtiapine, 25 mg, Nightly PRN     Today I independently reviewed pertinent medications, lines/drains/airways, imaging, cardiology results, laboratory results, microbiology results, notably:     ISTAT:   Recent Labs   Lab 11/13/22  0307   PH 7.475*   PCO2 36.2   PO2 116*   POCSATURATED 99   HCO3 26.7   BE 3   POCTCO2 28*   SAMPLE ARTERIAL      Chem:   Recent Labs   Lab 11/13/22  0405      K 3.9      CO2 26   *   BUN 13   CREATININE 0.6   CALCIUM 7.9*   MG 2.4   PHOS 2.7   ANIONGAP 11   PROT 5.9*   ALBUMIN 1.8*   BILITOT 1.3*   ALKPHOS 256*   AST 71*   ALT 78*     Heme:   Recent Labs   Lab 11/13/22  0405   WBC 12.12   HGB 8.7*   HCT 27.6*        Endo:   Recent Labs   Lab 11/12/22 2016 11/13/22  0033 11/13/22  0406   POCTGLUCOSE 122* 160* 181*

## 2022-11-13 NOTE — PLAN OF CARE
[facility] Care Plan    POC reviewed with Luis ARTIS Macario Arceo and family at 0300. No acute events overnight. Pt progressing toward goals. Will continue to monitor. See below and flowsheets for full assessment and VS info.     Amio gtt started due to Afiv RVR. Loading dose given followed by 1mg/min continuous infusion; per PA order, pt not lowered down to 0.5mg/min due to heart rate   Fentanyl PRN & Oxycodone PRN administered overnight for pain     Is this a stroke patient? yes- Stroke booklet reviewed with patient, risk factors identified for patient and stroke booklet remains at bedside for ongoing education.     Neuro:  [place] Coma Scale  Best Eye Response: 2-->(E2) to pain  Best Motor Response: 4-->(M4) withdraws from pain  Best Verbal Response: 1-->(V1) none  [place] Coma Scale Score: 7  Assessment Qualifiers: patient intubated  Pupil PERRLA: yes     24hr Temp:  [99.2 °F (37.3 °C)-100.9 °F (38.3 °C)]     CV:   Rhythm: atrial rhythm  BP goals:   SBP < 160  MAP > 65    Resp:   O2 Device (Oxygen Therapy): ventilator  Vent Mode: SIMV  Set Rate: 14 BPM  Oxygen Concentration (%): 30  Vt Set: 500 mL  PEEP/CPAP: 5 cmH20  Pressure Support: 5 cmH20    Plan: N/A and wean to extubate    GI/:     Diet/Nutrition Received: tube feeding  Last Bowel Movement: 11/13/22  Voiding Characteristics: urethral catheter (bladder)    Intake/Output Summary (Last 24 hours) at 11/13/2022 0614  Last data filed at 11/13/2022 0601  Gross per 24 hour   Intake 2016.35 ml   Output 1725 ml   Net 291.35 ml          Labs/Accuchecks:  Recent Labs   Lab 11/13/22  0405   WBC 12.12   RBC 2.84*   HGB 8.7*   HCT 27.6*         Recent Labs   Lab 11/13/22  0405      K 3.9   CO2 26      BUN 13   CREATININE 0.6   ALKPHOS 256*   ALT 78*   AST 71*   BILITOT 1.3*      Recent Labs   Lab 11/10/22  0212   INR 1.2   APTT 24.5    No results for input(s): CPK, CPKMB, TROPONINI, MB in the last 168 hours.    Electrolytes: N/A - electrolytes WDL  Accuchecks:  Q4H    Gtts:   amiodarone in dextrose 5%      dextrose 10 % in water (D10W)         LDA/Wounds:  Lines/Drains/Airways       Drain  Duration                  NG/OG Tube 22 0333 orogastric 18 Fr. Center mouth 8 days         Urethral Catheter 22 0630 Non-latex 16 Fr. 8 days              Airway  Duration                  Airway - Non-Surgical 22 0328 8 days              Peripheral Intravenous Line  Duration                  Peripheral IV - Single Lumen 22 20 G Anterior;Right Upper Arm 8 days         Peripheral IV - Single Lumen 22 18 G Anterior;Distal;Left Upper Arm 4 days         Peripheral IV - Single Lumen 22 20 G Anterior;Left Forearm 4 days                  Wounds: Yes  Wound care consulted: Yes    Problem: Adult Inpatient Plan of Care  Goal: Plan of Care Review  Outcome: Ongoing, Progressing  Goal: Patient-Specific Goal (Individualized)  Description: Admit Date: 2022    <principal problem not specified>    Past Medical History:  No date: A-fib  No date: Anticoagulant long-term use  No date: Coronary artery disease  No date: Diabetes mellitus      Comment:  no meds  No date: Digestive disorder  No date: Hypertension  No date: Paroxysmal atrial fibrillation  No date: Renal disorder      Comment:  kidney stone  No date: Sleep apnea      Comment:  c pap machine used  No date: Stroke      Comment:  tia  No date: Unspecified glaucoma    Past Surgical History:  No date: CARDIOVERSION  No date: CYSTOSCOPY  No date: EYE SURGERY  No date: JOINT REPLACEMENT; Bilateral  11/3/2022: TRANSESOPHAGEAL ECHOCARDIOGRAPHY; N/A      Comment:  Procedure: ECHOCARDIOGRAM, TRANSESOPHAGEAL;  Surgeon:                Ahmet Bowers MD;  Location: [facility];  Service:                Cardiology;  Laterality: N/A;    Individualization:   1. Bear hugger     Restraints:   Restraint Order  Length of Order: Order good for next 24 hours or when removed.  Date that the current order will : 22  Time  that the current order will : 0704  Order Upon Application: Yes         Outcome: Ongoing, Progressing  Goal: Absence of Hospital-Acquired Illness or Injury  Outcome: Ongoing, Progressing  Goal: Optimal Comfort and Wellbeing  Outcome: Ongoing, Progressing     Problem: Restraint, Nonbehavioral (Nonviolent)  Goal: Absence of Harm or Injury  Outcome: Ongoing, Progressing     Problem: Adjustment to Illness (Stroke, Ischemic/Transient Ischemic Attack)  Goal: Optimal Coping  Outcome: Ongoing, Progressing     Problem: Bowel Elimination Impaired (Stroke, Ischemic/Transient Ischemic Attack)  Goal: Effective Bowel Elimination  Outcome: Ongoing, Progressing

## 2022-11-13 NOTE — ASSESSMENT & PLAN NOTE
Vent Mode: SIMV  Oxygen Concentration (%):  [30] 30  Resp Rate Total:  [18 br/min-25 br/min] 22 br/min  Vt Set:  [500 mL] 500 mL  PEEP/CPAP:  [5 cmH20] 5 cmH20  Pressure Support:  [5 cmH20] 5 cmH20  Mean Airway Pressure:  [8.4 cmH20-9.8 cmH20] 9 cmH20     Daily CXR and abg  Respiratory culture growing gram neg rods  Abx broadened to Zosyn

## 2022-11-13 NOTE — SIGNIFICANT EVENT
I was called to bedside that pt was in a fib RVR sustaining -160s, -130s. 2.5 mg IV lopressor given x 1 without sustained resolution of RVR. Given concern to not drop BP too much and sustained HR >130 x 5-10min regardless of lopressor, amio gtt with bolus ordered for HR control. 1g Mag sulfate and 250cc bolus NS given, as well. TYSON reviewed without clot noted on PETER device or LV thrombus.    Tg Crump PA-C

## 2022-11-13 NOTE — ASSESSMENT & PLAN NOTE
-- AFib (on Eliquis), stopped prior to procedure LAAO w/Amulet procedure on 11/04/2022   -- Goal K > 4, Mg > 2   -- Continue metoprolol  -- Developed RVR overnight 11/12, now on amiodarone gtt

## 2022-11-13 NOTE — PROGRESS NOTES
[facility] - Neuro Critical Care  Neurocritical Care  Progress Note    Admit Date: 11/5/2022  Service Date: 11/13/2022  Length of Stay: 8    Subjective:     Chief Complaint: Cerebrovascular accident (CVA) due to embolism of left middle cerebral artery    History of Present Illness: Mr. Luis Sorto Jr. is a 76 y.o. male with PMHx of AFib (on Eliquis),CAD, HTN, DM II, s/p LAAO w/Amulet procedure on 11/04/2022 who presents as a transfer from [facility] to Neuro Critical Care s/p [facility] for evaluation of L MCA stroke w/ICH. HPI information gathered from review of the patient's medical record due to the patient being intubated, no family at the bedside.  Patient was LKN around 2000.  He had been discharged home s/p LAAO w/Amulet device on 11/4/2022 around 1000 am.  He acutely developed confusion with difficulty walking and standing, difficulty conversing, and difficulty finding his bathroom.  There were no reported preceding symptoms and nothing was reported to exacerbate or alleviate the symptoms. He presented to [facility] ED where a CTH was obtained and was negative for acute findings.  Telestroke consult was performed by . TNKase was administered as the patient had been off of Eliquis >24h .  Of note, per the ED record patient was noted to have increased pain and swelling in the right groin in the area of the venous access from his Cardiology procedure and then the patient began to have bleeding and bruising in the anterior thigh on he right side. Patient became hemodynamically unstable and was started on Levophed. Outside Hospital Cardiology deparment placed an IV in the left groin for access. Patient was then stabilized. CT Head was ordered and showed Left ICH. Patient was intubated at outside hospital. Cryo was ordered at the outside hospital but not given. Patient was then transferred to [facility] Neuro [facility] for a higher level of care. On arrival to [facility] the  patient is intubated on fentanyl and levophed, NIH 22, hemodynamically unstable, with swelling to the Right groin, unresponsive, Cryo re-ordered and CT Head STAT. Patient will be admitted to Neuro ICU for a higher level of care.     Hospital Course: 11/06/2022: Patient is now s/p R pseudoaneurysm repair. Patient with slightly worsening ICH overnight, exam unchanged. Some oozing from the R groin site, however improved after holding pressure and applying a sandbag. Will consider FFP if continues to oozy from groin site. Patient off pressors, fluids increased. TYSON pending due to Echo findings and concern for possible thrombus.Tube feedings started.  11/07/2022: TYSON shows no thrombus and improved EF. Weaning sedation as tolerated. Discontinue venous femoral line. Restart tube feedings post TYSON.  11/08/2022: NAEON. Weaning sedation as tolerated. Transitioning from fentanyl gtt to precedex gtt. Oozing from cath site resolved. Plan for pressure support trial this afternoon. Transitioned from insulin gtt to subq insulin.  11/09/2022: NAEON. Tolerated SBT. Ceftriaxone started for UTI.   11/10/2022: NAEON. Amantadine started. GOC conversation had with son. Continuing SBT. Remains intermittently febrile with improving leukocytosis. Respiratory culture pending. US of the extremities pending to rule out DVT. ETT advanced.   11/11/2022: gram negative in resp cx- start Zosyn, updated patient's wife at bedside, GOC discussed today and Patient's wife will have a decision on Monday 11/12/2022: NAEON. Sputum growing GNR, abx broadened. CT abd/pelvis shows stable R groin pseudoaneurysm repair with surrounding edema and new finding of metastatic bone lesions likely secondary to prostate CA given enlarged prostate and elevated PSA. Discussed findings with wife at the bedside. Pulmonary toileting started, SBT today.  11/13/2022: Patient developed AF with RVR overnight. Lopressor given with little response. Amiodarone started. Metoprolol  po restarted.    Review of Systems: Unable to obtain a complete ROS due to level of consciousness.     Vitals:   Temp: 99.7 °F (37.6 °C)  Pulse: (!) 140  Rhythm: atrial rhythm  BP: (!) 144/67  MAP (mmHg): 94  Resp: 19  SpO2: 95 %  Oxygen Concentration (%): 30  O2 Device (Oxygen Therapy): ventilator  Vent Mode: SIMV  Set Rate: 14 BPM  Vt Set: 500 mL  Pressure Support: 5 cmH20  PEEP/CPAP: 5 cmH20  Peak Airway Pressure: 20 cmH2O  Mean Airway Pressure: 9 cmH20  Plateau Pressure: 0 cmH20    Temp  Min: 99.2 °F (37.3 °C)  Max: 100.9 °F (38.3 °C)  Pulse  Min: 103  Max: 142  BP  Min: 115/57  Max: 156/72  MAP (mmHg)  Min: 77  Max: 106  Resp  Min: 11  Max: 30  SpO2  Min: 95 %  Max: 100 %  Oxygen Concentration (%)  Min: 30  Max: 30    11/12 0701 - 11/13 0700  In: 2016.4 [I.V.:406]  Out: 1725 [Urine:1725]         Examination:   Constitutional: Well-nourished and -developed. No apparent distress.   Eyes: Conjunctiva clear, anicteric. Lids no lesions.  Head/Ears/Nose/Mouth/Throat/Neck: Moist mucous membranes. External ears, nose atraumatic.   Cardiovascular: Regular rhythm. No leg edema.  Respiratory: Comfortable respirations. Clear to auscultation.  Gastrointestinal: Soft, nondistended, nontender. + bowel sounds.    Neurologic:  -GCS E 3 V 1t M 5  -Opens eyes to voice. Does not track. Grimaces to noxious stimuli. Unable to follow commands.  -Cranial nerves: EOM intact, PERRL , no facial droop, + cough  -Motor: LUE localizes to pain, LLE withdraws to pain, RUE extensor posturing, RLE withdraws to pain  -Sensation: Intact to light touch  Unable to test orientation, language, memory, judgment, insight, fund of knowledge, shoulder shrug, tongue protrusion, coordination, gait due to level of consciousness.    Medications:   Continuousamiodarone in dextrose 5%, Last Rate: 1 mg/min (11/13/22 0756)  dextrose 10 % in water (D10W)  Scheduledacetylcysteine 100 mg/ml (10%), 4 mL, Q6H  albuterol-ipratropium, 3 mL, Q6H  amantadine HCL, 100 mg,  BID  atorvastatin, 40 mg, Daily  famotidine, 20 mg, BID  heparin (porcine), 5,000 Units, Q8H  insulin aspart U-100, 6 Units, 6 times per day  lisinopriL, 40 mg, Daily  metoprolol, 2.5 mg, Once  piperacillin-tazobactam (ZOSYN) IVPB, 4.5 g, Q8H  sodium chloride 0.9%, 3 mL, Q8H  PRNacetaminophen, 650 mg, Q6H PRN  bisacodyL, 10 mg, Daily PRN  dextrose 10 % in water (D10W), , Continuous PRN  dextrose 10%, 12.5 g, PRN  dextrose 10%, 25 g, PRN  fentaNYL, 25 mcg, Q1H PRN  glucagon (human recombinant), 1 mg, PRN  hydrALAZINE, 10 mg, Q6H PRN  insulin aspart U-100, 1-10 Units, Q4H PRN  labetalol, 10 mg, Q4H PRN  magnesium oxide, 800 mg, PRN  magnesium oxide, 800 mg, PRN  ondansetron, 4 mg, Q6H PRN  oxyCODONE, 5 mg, Q6H PRN  potassium bicarbonate, 35 mEq, PRN  potassium bicarbonate, 50 mEq, PRN  potassium bicarbonate, 60 mEq, PRN  potassium, sodium phosphates, 2 packet, PRN  potassium, sodium phosphates, 2 packet, PRN  potassium, sodium phosphates, 2 packet, PRN  QUEtiapine, 25 mg, Nightly PRN     Today I independently reviewed pertinent medications, lines/drains/airways, imaging, cardiology results, laboratory results, microbiology results, notably:     ISTAT:   Recent Labs   Lab 11/13/22  0307   PH 7.475*   PCO2 36.2   PO2 116*   POCSATURATED 99   HCO3 26.7   BE 3   POCTCO2 28*   SAMPLE ARTERIAL      Chem:   Recent Labs   Lab 11/13/22  0405      K 3.9      CO2 26   *   BUN 13   CREATININE 0.6   CALCIUM 7.9*   MG 2.4   PHOS 2.7   ANIONGAP 11   PROT 5.9*   ALBUMIN 1.8*   BILITOT 1.3*   ALKPHOS 256*   AST 71*   ALT 78*     Heme:   Recent Labs   Lab 11/13/22  0405   WBC 12.12   HGB 8.7*   HCT 27.6*        Endo:   Recent Labs   Lab 11/12/22 2016 11/13/22  0033 11/13/22  0406   POCTGLUCOSE 122* 160* 181*     Assessment/Plan:     Neuro  * Cerebrovascular accident (CVA) due to embolism of left middle cerebral artery  s/p Tenecteplase with new Left ICH   - Continue to hold ASA    Left-sided nontraumatic  intracerebral hemorrhage   stroke s/p Tenecteplase with new Left ICH   -- AFib (on Eliquis)  -- s/p LAAO w/Amulet procedure on 11/04/2022 s/p TNKase  -- Continue Neuro checks q 1hr  -- Vascular Neurology consulted  -- Neurosurgery consulted  -- SBP goal <160   -- PT/OT/Speech  -- Atorvastatin 40 mg  -- Slight increase in size of bleed overnight 11/5, no change in exam  -- No surgical intervention at this time per NSGY team   -- Ongoing goals of care discussions with patient's wife at bedside. Patient's wife stated she will have an answer on Monday  -- Stable exam and imaging    Pulmonary  On mechanically assisted ventilation  Vent Mode: SIMV  Oxygen Concentration (%):  [30] 30  Resp Rate Total:  [18 br/min-25 br/min] 22 br/min  Vt Set:  [500 mL] 500 mL  PEEP/CPAP:  [5 cmH20] 5 cmH20  Pressure Support:  [5 cmH20] 5 cmH20  Mean Airway Pressure:  [8.4 cmH20-9.8 cmH20] 9 cmH20     Daily CXR and abg  Respiratory culture growing gram neg rods  Abx broadened to Zosyn      Cardiac/Vascular  Acute systolic heart failure  Echo shows:   The following segments are hypokinetic: mid inferoseptal, apical septal and apex. Small dyskinetic apical cap with no thrombus seen. All other segments are normal.   The estimated ejection fraction is 45%.   The left ventricle is normal in size with concentric remodeling and mildly decreased systolic function.   Left ventricular diastolic dysfunction.   Normal right ventricular size with normal right ventricular systolic function.   Severe left atrial enlargement.   The estimated PA systolic pressure is 28 mmHg.   Normal central venous pressure (3 mmHg).    TYSON 11/7 shows no thrombus, improved EF of 55%    Pseudoaneurysm of right femoral artery  S/p repair 11/5  Vascular surgery following, appreciate recs  CT abd/pelvis, stable repair with surrounding edema    Hyperlipidemia  -- Lipid panel  -- Atorvastatin 40 mg daily    Persistent atrial fibrillation  -- AFib (on Eliquis), stopped  prior to procedure LAAO w/Amulet procedure on 11/04/2022   -- Goal K > 4, Mg > 2   -- Continue metoprolol  -- Developed RVR overnight 11/12, now on amiodarone gtt    Renal/  Acute cystitis without hematuria  - Urine culture positive for Klebsiella  - Abx broadened to Zosyn    Oncology  Malignant neoplasm metastatic to bone  Likely 2/2 Prostate CA    CT abd/pelvis shows:  New heterogeneity of the visualized bones with multiple large sclerotic lesions, worrisome for osseous metastatic disease.  New large Schmorl's node or pathologic fracture involving the superior endplate of L1  As well as enlarged prostate    PSA level 33.4    New findings discussed with wife at bedside on 11/12    Endocrine  Diabetes mellitus  Diabetes Mellitus Type II  -- Hyperglycemic in the 300s on admit  -- HgbA1c 6.1  -- Insulin gtt dc'd 11/8, transitioned to subq insulin with SSI  -- Scheduled aspart decreased due to hypoglycemia  -- SSI  -- Accuchecks  -- Diabetic tube feeds    GI  Oral phase dysphagia  Intubated  OGT in place for nutrition and meds  Cont tube feedings    Groin swelling  -- s/p LAAO w/Amulet procedure on 11/04/2022  s/p TNKase  -- Stable s/p pseudoaneurysm repair    The patient is being Prophylaxed for:  Venous Thromboembolism with: Mechanical or Chemical  Stress Ulcer with: H2B  Ventilator Pneumonia with: chlorhexidine oral care    Activity Orders          Turn patient starting at 11/05 1258    Elevate HOB starting at 11/05 0623    Diet NPO: NPO starting at 11/05 0623        Full Code     Critical condition in that Patient has a condition that poses threat to life and bodily function: ICH, mechanical ventilation weaning, AF with RVR, metastatic disease     33 minutes of Critical care time was spent personally by me on the following activities: development of treatment plan with patient or surrogate and bedside caregivers, discussions with consultants, evaluation of patient's response to treatment, examination of patient,  ordering and performing treatments and interventions, ordering and review of laboratory studies, ordering and review of radiographic studies, pulse oximetry, antibiotic titration if applicable, vasopressor titration if applicable, re-evaluation of patient's condition. This critical care time did not overlap with that of any other provider or involve time for any procedures. There is high probability for acute neurological change leading to clinical and possibly life-threatening deterioration requiring highest level of physician preparedness for urgent intervention.    Naomie Lewis NP  Neurocritical Care  [facility] - Neuro Critical Care

## 2022-11-14 PROBLEM — Z51.5 COMFORT MEASURES ONLY STATUS: Status: ACTIVE | Noted: 2022-11-14

## 2022-11-14 LAB
ALBUMIN SERPL BCP-MCNC: 1.9 G/DL (ref 3.5–5.2)
ALLENS TEST: ABNORMAL
ALP SERPL-CCNC: 253 U/L (ref 55–135)
ALT SERPL W/O P-5'-P-CCNC: 67 U/L (ref 10–44)
ANION GAP SERPL CALC-SCNC: 11 MMOL/L (ref 8–16)
AST SERPL-CCNC: 63 U/L (ref 10–40)
BASOPHILS # BLD AUTO: 0.07 K/UL (ref 0–0.2)
BASOPHILS NFR BLD: 0.5 % (ref 0–1.9)
BILIRUB SERPL-MCNC: 1.3 MG/DL (ref 0.1–1)
BUN SERPL-MCNC: 11 MG/DL (ref 8–23)
CALCIUM SERPL-MCNC: 8 MG/DL (ref 8.7–10.5)
CHLORIDE SERPL-SCNC: 103 MMOL/L (ref 95–110)
CO2 SERPL-SCNC: 25 MMOL/L (ref 23–29)
CREAT SERPL-MCNC: 0.7 MG/DL (ref 0.5–1.4)
DELSYS: ABNORMAL
DIFFERENTIAL METHOD: ABNORMAL
EOSINOPHIL # BLD AUTO: 0.3 K/UL (ref 0–0.5)
EOSINOPHIL NFR BLD: 2.1 % (ref 0–8)
ERYTHROCYTE [DISTWIDTH] IN BLOOD BY AUTOMATED COUNT: 15.4 % (ref 11.5–14.5)
EST. GFR  (NO RACE VARIABLE): >60 ML/MIN/1.73 M^2
FIO2: 30
GLUCOSE SERPL-MCNC: 159 MG/DL (ref 70–110)
HCO3 UR-SCNC: 28.5 MMOL/L (ref 24–28)
HCT VFR BLD AUTO: 27.3 % (ref 40–54)
HGB BLD-MCNC: 8.3 G/DL (ref 14–18)
IMM GRANULOCYTES # BLD AUTO: 0.15 K/UL (ref 0–0.04)
IMM GRANULOCYTES NFR BLD AUTO: 1.1 % (ref 0–0.5)
LYMPHOCYTES # BLD AUTO: 1.7 K/UL (ref 1–4.8)
LYMPHOCYTES NFR BLD: 12.2 % (ref 18–48)
MAGNESIUM SERPL-MCNC: 2.4 MG/DL (ref 1.6–2.6)
MCH RBC QN AUTO: 29.7 PG (ref 27–31)
MCHC RBC AUTO-ENTMCNC: 30.4 G/DL (ref 32–36)
MCV RBC AUTO: 98 FL (ref 82–98)
MIN VOL: 9
MODE: ABNORMAL
MONOCYTES # BLD AUTO: 1.2 K/UL (ref 0.3–1)
MONOCYTES NFR BLD: 8.6 % (ref 4–15)
NEUTROPHILS # BLD AUTO: 10.7 K/UL (ref 1.8–7.7)
NEUTROPHILS NFR BLD: 75.5 % (ref 38–73)
NRBC BLD-RTO: 0 /100 WBC
PCO2 BLDA: 34.8 MMHG (ref 35–45)
PEEP: 8
PH SMN: 7.52 [PH] (ref 7.35–7.45)
PHOSPHATE SERPL-MCNC: 3 MG/DL (ref 2.7–4.5)
PLATELET # BLD AUTO: 420 K/UL (ref 150–450)
PMV BLD AUTO: 9.2 FL (ref 9.2–12.9)
PO2 BLDA: 122 MMHG (ref 80–100)
POC BE: 6 MMOL/L
POC SATURATED O2: 99 % (ref 95–100)
POC TCO2: 30 MMOL/L (ref 23–27)
POCT GLUCOSE: 138 MG/DL (ref 70–110)
POCT GLUCOSE: 161 MG/DL (ref 70–110)
POCT GLUCOSE: 169 MG/DL (ref 70–110)
POTASSIUM SERPL-SCNC: 3.9 MMOL/L (ref 3.5–5.1)
PROT SERPL-MCNC: 6.1 G/DL (ref 6–8.4)
PS: 10
RBC # BLD AUTO: 2.79 M/UL (ref 4.6–6.2)
SAMPLE: ABNORMAL
SITE: ABNORMAL
SODIUM SERPL-SCNC: 139 MMOL/L (ref 136–145)
SP02: 98
SPONT RATE: 19
WBC # BLD AUTO: 14.14 K/UL (ref 3.9–12.7)

## 2022-11-14 PROCEDURE — 63600175 PHARM REV CODE 636 W HCPCS

## 2022-11-14 PROCEDURE — 20000000 HC ICU ROOM

## 2022-11-14 PROCEDURE — 82803 BLOOD GASES ANY COMBINATION: CPT

## 2022-11-14 PROCEDURE — 94003 VENT MGMT INPAT SUBQ DAY: CPT

## 2022-11-14 PROCEDURE — 94640 AIRWAY INHALATION TREATMENT: CPT

## 2022-11-14 PROCEDURE — 63600175 PHARM REV CODE 636 W HCPCS: Performed by: NURSE PRACTITIONER

## 2022-11-14 PROCEDURE — 99233 PR SUBSEQUENT HOSPITAL CARE,LEVL III: ICD-10-PCS | Mod: GC,,, | Performed by: PSYCHIATRY & NEUROLOGY

## 2022-11-14 PROCEDURE — 25000003 PHARM REV CODE 250: Performed by: NURSE PRACTITIONER

## 2022-11-14 PROCEDURE — 25000003 PHARM REV CODE 250

## 2022-11-14 PROCEDURE — 99291 PR CRITICAL CARE, E/M 30-74 MINUTES: ICD-10-PCS | Mod: FS,,, | Performed by: NURSE PRACTITIONER

## 2022-11-14 PROCEDURE — 94761 N-INVAS EAR/PLS OXIMETRY MLT: CPT

## 2022-11-14 PROCEDURE — 99900035 HC TECH TIME PER 15 MIN (STAT)

## 2022-11-14 PROCEDURE — 84100 ASSAY OF PHOSPHORUS: CPT | Performed by: PHYSICIAN ASSISTANT

## 2022-11-14 PROCEDURE — 36600 WITHDRAWAL OF ARTERIAL BLOOD: CPT

## 2022-11-14 PROCEDURE — 25000003 PHARM REV CODE 250: Performed by: PSYCHIATRY & NEUROLOGY

## 2022-11-14 PROCEDURE — 27000221 HC OXYGEN, UP TO 24 HOURS

## 2022-11-14 PROCEDURE — 99233 SBSQ HOSP IP/OBS HIGH 50: CPT | Mod: GC,,, | Performed by: PSYCHIATRY & NEUROLOGY

## 2022-11-14 PROCEDURE — 83735 ASSAY OF MAGNESIUM: CPT | Performed by: PHYSICIAN ASSISTANT

## 2022-11-14 PROCEDURE — 25000003 PHARM REV CODE 250: Performed by: PHYSICIAN ASSISTANT

## 2022-11-14 PROCEDURE — 80053 COMPREHEN METABOLIC PANEL: CPT | Performed by: PHYSICIAN ASSISTANT

## 2022-11-14 PROCEDURE — 25000242 PHARM REV CODE 250 ALT 637 W/ HCPCS: Performed by: PSYCHIATRY & NEUROLOGY

## 2022-11-14 PROCEDURE — 27200966 HC CLOSED SUCTION SYSTEM

## 2022-11-14 PROCEDURE — A4216 STERILE WATER/SALINE, 10 ML: HCPCS | Performed by: PHYSICIAN ASSISTANT

## 2022-11-14 PROCEDURE — 25000242 PHARM REV CODE 250 ALT 637 W/ HCPCS: Performed by: NURSE PRACTITIONER

## 2022-11-14 PROCEDURE — 85025 COMPLETE CBC W/AUTO DIFF WBC: CPT | Performed by: PHYSICIAN ASSISTANT

## 2022-11-14 PROCEDURE — 99900026 HC AIRWAY MAINTENANCE (STAT)

## 2022-11-14 PROCEDURE — 99291 CRITICAL CARE FIRST HOUR: CPT | Mod: FS,,, | Performed by: NURSE PRACTITIONER

## 2022-11-14 RX ORDER — MORPHINE SULFATE 1 MG/ML
0-10 INJECTION, SOLUTION INTRAVENOUS CONTINUOUS
Status: DISCONTINUED | OUTPATIENT
Start: 2022-11-14 | End: 2022-11-15 | Stop reason: HOSPADM

## 2022-11-14 RX ORDER — ONDANSETRON 2 MG/ML
8 INJECTION INTRAMUSCULAR; INTRAVENOUS EVERY 8 HOURS PRN
Status: DISCONTINUED | OUTPATIENT
Start: 2022-11-14 | End: 2022-11-15 | Stop reason: HOSPADM

## 2022-11-14 RX ORDER — SCOLOPAMINE TRANSDERMAL SYSTEM 1 MG/1
1 PATCH, EXTENDED RELEASE TRANSDERMAL
Status: DISCONTINUED | OUTPATIENT
Start: 2022-11-14 | End: 2022-11-15 | Stop reason: HOSPADM

## 2022-11-14 RX ORDER — LORAZEPAM 0.5 MG/1
1 TABLET ORAL
Status: DISCONTINUED | OUTPATIENT
Start: 2022-11-14 | End: 2022-11-14

## 2022-11-14 RX ORDER — MORPHINE SULFATE 2 MG/ML
2 INJECTION, SOLUTION INTRAMUSCULAR; INTRAVENOUS
Status: DISCONTINUED | OUTPATIENT
Start: 2022-11-14 | End: 2022-11-15 | Stop reason: HOSPADM

## 2022-11-14 RX ORDER — SCOLOPAMINE TRANSDERMAL SYSTEM 1 MG/1
1 PATCH, EXTENDED RELEASE TRANSDERMAL
Qty: 10 PATCH | Refills: 0
Start: 2022-11-17 | End: 2022-12-17

## 2022-11-14 RX ADMIN — ACETAMINOPHEN 650 MG: 325 TABLET ORAL at 05:11

## 2022-11-14 RX ADMIN — SCOPALAMINE 1 PATCH: 1 PATCH, EXTENDED RELEASE TRANSDERMAL at 01:11

## 2022-11-14 RX ADMIN — INSULIN ASPART 6 UNITS: 100 INJECTION, SOLUTION INTRAVENOUS; SUBCUTANEOUS at 08:11

## 2022-11-14 RX ADMIN — FAMOTIDINE 20 MG: 20 TABLET ORAL at 08:11

## 2022-11-14 RX ADMIN — LORAZEPAM 1 MG: 0.5 TABLET ORAL at 01:11

## 2022-11-14 RX ADMIN — AMANTADINE HYDROCHLORIDE 100 MG: 50 SOLUTION ORAL at 05:11

## 2022-11-14 RX ADMIN — INSULIN ASPART 6 UNITS: 100 INJECTION, SOLUTION INTRAVENOUS; SUBCUTANEOUS at 05:11

## 2022-11-14 RX ADMIN — INSULIN ASPART 1 UNITS: 100 INJECTION, SOLUTION INTRAVENOUS; SUBCUTANEOUS at 05:11

## 2022-11-14 RX ADMIN — FENTANYL CITRATE 25 MCG: 0.05 INJECTION, SOLUTION INTRAMUSCULAR; INTRAVENOUS at 08:11

## 2022-11-14 RX ADMIN — MORPHINE SULFATE 2 MG: 2 INJECTION, SOLUTION INTRAMUSCULAR; INTRAVENOUS at 02:11

## 2022-11-14 RX ADMIN — MORPHINE SULFATE 2 MG: 2 INJECTION, SOLUTION INTRAMUSCULAR; INTRAVENOUS at 01:11

## 2022-11-14 RX ADMIN — PIPERACILLIN SODIUM AND TAZOBACTAM SODIUM 4.5 G: 4; .5 INJECTION, POWDER, LYOPHILIZED, FOR SOLUTION INTRAVENOUS at 05:11

## 2022-11-14 RX ADMIN — IPRATROPIUM BROMIDE AND ALBUTEROL SULFATE 3 ML: 2.5; .5 SOLUTION RESPIRATORY (INHALATION) at 07:11

## 2022-11-14 RX ADMIN — HEPARIN SODIUM 5000 UNITS: 5000 INJECTION INTRAVENOUS; SUBCUTANEOUS at 05:11

## 2022-11-14 RX ADMIN — MORPHINE SULFATE 1 MG/HR: 4 INJECTION INTRAVENOUS at 11:11

## 2022-11-14 RX ADMIN — Medication 3 ML: at 06:11

## 2022-11-14 RX ADMIN — ACETYLCYSTEINE 4 ML: 100 INHALANT RESPIRATORY (INHALATION) at 07:11

## 2022-11-14 RX ADMIN — INSULIN ASPART 1 UNITS: 100 INJECTION, SOLUTION INTRAVENOUS; SUBCUTANEOUS at 01:11

## 2022-11-14 RX ADMIN — METOPROLOL TARTRATE 50 MG: 50 TABLET, FILM COATED ORAL at 05:11

## 2022-11-14 RX ADMIN — OXYCODONE 5 MG: 5 TABLET ORAL at 05:11

## 2022-11-14 RX ADMIN — AMANTADINE HYDROCHLORIDE 100 MG: 50 SOLUTION ORAL at 09:11

## 2022-11-14 RX ADMIN — ATORVASTATIN CALCIUM 40 MG: 40 TABLET, FILM COATED ORAL at 08:11

## 2022-11-14 RX ADMIN — AMIODARONE HYDROCHLORIDE 0.5 MG/MIN: 1.8 INJECTION, SOLUTION INTRAVENOUS at 09:11

## 2022-11-14 RX ADMIN — IPRATROPIUM BROMIDE AND ALBUTEROL SULFATE 3 ML: 2.5; .5 SOLUTION RESPIRATORY (INHALATION) at 01:11

## 2022-11-14 RX ADMIN — LISINOPRIL 20 MG: 20 TABLET ORAL at 08:11

## 2022-11-14 RX ADMIN — SCOPALAMINE 1 PATCH: 1 PATCH, EXTENDED RELEASE TRANSDERMAL at 10:11

## 2022-11-14 RX ADMIN — FENTANYL CITRATE 25 MCG: 0.05 INJECTION, SOLUTION INTRAMUSCULAR; INTRAVENOUS at 02:11

## 2022-11-14 RX ADMIN — METOPROLOL TARTRATE 50 MG: 50 TABLET, FILM COATED ORAL at 12:11

## 2022-11-14 RX ADMIN — ACETYLCYSTEINE 4 ML: 100 INHALANT RESPIRATORY (INHALATION) at 01:11

## 2022-11-14 RX ADMIN — MORPHINE SULFATE 2 MG: 2 INJECTION, SOLUTION INTRAMUSCULAR; INTRAVENOUS at 06:11

## 2022-11-14 RX ADMIN — INSULIN ASPART 6 UNITS: 100 INJECTION, SOLUTION INTRAVENOUS; SUBCUTANEOUS at 12:11

## 2022-11-14 RX ADMIN — MORPHINE SULFATE 2 MG: 2 INJECTION, SOLUTION INTRAMUSCULAR; INTRAVENOUS at 09:11

## 2022-11-14 NOTE — PLAN OF CARE
[facility] - Neuro Critical Care  Discharge Reassessment    Primary Care Provider: Lui Santamaria MD    Expected Discharge Date: 11/15/2022    Per MD, plan is for home hospice tomorrow with  [facility]    Reassessment (most recent)       Discharge Reassessment - 11/14/22 1152          Discharge Reassessment    Assessment Type Discharge Planning Reassessment     Did the patient's condition or plan change since previous assessment? No     Communicated MORRIS with patient/caregiver Yes     Discharge Plan A Hospice/home     Discharge Plan B Hospice/home     DME Needed Upon Discharge  none     Discharge Barriers Identified None     Why the patient remains in the hospital Requires continued medical care                   Alyse Santamaria RN, CCRN-K, [facility]  Neuro-Critical Care   [phone]

## 2022-11-14 NOTE — SUBJECTIVE & OBJECTIVE
Review of Systems: Unable to obtain a complete ROS due to level of consciousness.     Vitals:   Temp: (!) 100.7 °F (38.2 °C)  Pulse: (!) 116  Rhythm: atrial rhythm  BP: (!) 149/67  MAP (mmHg): 97  Resp: 18  SpO2: 97 %  Oxygen Concentration (%): 30  O2 Device (Oxygen Therapy): ventilator  Vent Mode: Spont  Pressure Support: 10 cmH20  PEEP/CPAP: 8 cmH20  Peak Airway Pressure: 18 cmH2O  Mean Airway Pressure: 12 cmH20  Plateau Pressure: 0 cmH20    Temp  Min: 99.6 °F (37.6 °C)  Max: 101.9 °F (38.8 °C)  Pulse  Min: 96  Max: 126  BP  Min: 107/65  Max: 157/79  MAP (mmHg)  Min: 80  Max: 114  Resp  Min: 12  Max: 36  SpO2  Min: 95 %  Max: 100 %  Oxygen Concentration (%)  Min: 30  Max: 30    11/13 0701 - 11/14 0700  In: 2947.1 [I.V.:794.4]  Out: 1535 [Urine:1235]         Examination:   Constitutional: Well-nourished and -developed. No apparent distress.   Eyes: Conjunctiva clear, anicteric. Lids no lesions.  Head/Ears/Nose/Mouth/Throat/Neck: Moist mucous membranes. External ears, nose atraumatic.   Cardiovascular: Regular rhythm. No leg edema.  Respiratory: ETT. Comfortable respirations. Clear to auscultation.  Gastrointestinal: Soft, nondistended, nontender. + bowel sounds.    Neurologic:  -GCS E 3 V 1t M 5  -Opens eyes to voice. Does not track. Grimaces to noxious stimuli. Unable to follow commands.  -Cranial nerves: EOM intact, PERRL , no facial droop, + cough  -Motor: LUE localizes to pain, LLE withdraws to pain, RUE extensor posturing, RLE withdraws to pain  -Sensation: Intact to light touch  Unable to test orientation, language, memory, judgment, insight, fund of knowledge, shoulder shrug, tongue protrusion, coordination, gait due to level of consciousness.    Medications:   Continuousmorphine, Last Rate: 2.5 mg/hr (11/14/22 1302)  Scheduledscopolamine, 1 patch, Q3 Days  PRNLORazepam, 1 mg, Q2H PRN  morphine, 2 mg, Q1H PRN  ondansetron, 8 mg, Q8H PRN     Today I independently reviewed pertinent medications,  lines/drains/airways, imaging, cardiology results, laboratory results, microbiology results, notably:     ISTAT:   Recent Labs   Lab 11/14/22  0600   PH 7.521*   PCO2 34.8*   PO2 122*   POCSATURATED 99   HCO3 28.5*   BE 6   POCTCO2 30*   SAMPLE ARTERIAL      Chem:   Recent Labs   Lab 11/14/22  0339      K 3.9      CO2 25   *   BUN 11   CREATININE 0.7   CALCIUM 8.0*   MG 2.4   PHOS 3.0   ANIONGAP 11   PROT 6.1   ALBUMIN 1.9*   BILITOT 1.3*   ALKPHOS 253*   AST 63*   ALT 67*     Heme:   Recent Labs   Lab 11/14/22 0339   WBC 14.14*   HGB 8.3*   HCT 27.3*        Endo:   Recent Labs   Lab 11/13/22 2039 11/14/22 0059 11/14/22  0535   POCTGLUCOSE 133* 169* 161*

## 2022-11-14 NOTE — ACP (ADVANCE CARE PLANNING)
[facility]  I engaged the family (wife and son) in a conversation about advance care planning and we specifically addressed what the goals of care would be moving forward, in light of the patient's clinical status, specifically large multifocal strokes with hemorrhagic transformation, PNA, suspected prostate cancer metastatic to bone.  We did specifically address the patient's likely prognosis, which is poor.  Notably we discussed that while patient is a bit more awake over last several days, remains unclear if he would be able to tolerate extubation, would be high risk for aspiration/recurrent pneumonia, would also require a PEG tube and placement. 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 In addition, discussed evidence of widely metastatic cancer on CT A/P w/ diffuse sclerotic lesions, and that at this time patient not a candidate for treatment given poor functional status, cancer likely to continue to progress while pt attempts to recover from neurologic insult. We explored the patient's values and preferences for future care.  The family endorses that what is most important right now is to focus on spending time at home and comfort and QOL     Accordingly, we have decided that the best plan to meet the patient's goals includes enrolling in hospice care and discontinuation of invasive life support measures. Pt transitioned to comfort based care, plan for morphine gtt for pain/air hunger management. Compassionate extubation once morphine gtt at bedside and pt seen by  for last rites.     I did explain the role for hospice care at this stage of the patient's illness, including its ability to help the patient live with the best quality of life possible.  We will be making a hospice referral. Wife requesting [facility] hospice agency, goal to bring pt home w/ hospice tomorrow if stable for transport following compassionate extubation.    I  spent a total of 35 minutes engaging the family in this advance care planning discussion. Patient unable to participate given pt intubated w/ critical illness, no clear comprehension of commands/verbal speech.     Rebecca Alvarez MD, MPH  Neurocritical Care Physician

## 2022-11-14 NOTE — PROGRESS NOTES
"[facility] - Neuro Critical Care  Adult Nutrition  Progress Note    SUMMARY       Recommendations    Continue TF regimen of Glucerna 1.5 @ 55 ml/hr- provides 1980 kcals, 109 g pro, and 1002 ml free water.      If able to tolerate PO intake, ADAT to diabetic diet- texture per SLP.     RD following.    Goals: Will meet % EEN/EPN by next RD visit.  Nutrition Goal Status: goal met  Communication of RD Recs:  (POC)    Assessment and Plan    Nutrition Problem  Inadequate oral intake     Related to (etiology):   Inability to consume sufficient needs     Signs and Symptoms (as evidenced by):   NPO status      Interventions/Recommendations (treatment strategy):  Collaboration of nutrition care with other providers   EN     Nutrition Diagnosis Status:   Continues    Reason for Assessment    Reason For Assessment: RD follow-up  Diagnosis: stroke/CVA  Relevant Medical History: Persistent Afib, ICH, T2DM, HLD, lactic acidosis, acute systolic HF, oral phase dysphagia, GERD, CAD  Interdisciplinary Rounds: did not attend  General Information Comments: Noted family decided to withdrawal care and transition to comfort care. Unable to speak with pt 2/2 being on vent/intubated. S/p R pesudoanurysm repair on 11/5. Noted TF order and that TF is currently clamped. No pert wt hx per chart review. NFPE not warranted 2/2 appearing well-nourished. LBM 11/12.  Nutrition Discharge Planning: Pending hospital course    Nutrition Risk Screen    Nutrition Risk Screen: tube feeding or parenteral nutrition, dysphagia or difficulty swallowing    Nutrition/Diet History    Spiritual, Cultural Beliefs, Islam Practices, Values that Affect Care: no  Food Allergies: NKFA  Factors Affecting Nutritional Intake: NPO, on mechanical ventilation, difficulty/impaired swallowing    Anthropometrics    Temp: (!) 100.7 °F (38.2 °C)  Height Method: Estimated  Height: 5' 9" (175.3 cm)  Height (inches): 69 in  Weight Method: Bed Scale  Weight: 116.6 kg (257 " lb)  Weight (lb): 257 lb  Ideal Body Weight (IBW), Male: 160 lb  % Ideal Body Weight, Male (lb): 160.63 %  BMI (Calculated): 37.9  BMI Grade: 35 - 39.9 - obesity - grade II     Lab/Procedures/Meds    Pertinent Labs Reviewed: reviewed  Pertinent Labs Comments: Glucose 159, A1C 6.1, Albumin 1.9, Ca 8.0, AST 63, ALT 67, Al Phos 253, T Bili 1.3  Pertinent Medications Reviewed: reviewed  Pertinent Medications Comments: morphine    Estimated/Assessed Needs    Weight Used For Calorie Calculations: 116.6 kg (257 lb 0.9 oz)  Energy Calorie Requirements (kcal): 1915 kcals  Energy Need Method: [facility] [address] [facility]  Protein Requirements: 109-145 g (1.5-2.0 g/kg)  Weight Used For Protein Calculations: 72.6 kg (160 lb)  Fluid Requirements (mL): 1 ml or fluid per MD  Estimated Fluid Requirement Method: RDA Method  RDA Method (mL): 1915  CHO Requirement: 239 g    Nutrition Prescription Ordered    Current Diet Order: NPO  Current Nutrition Support Formula Ordered: Glucerna 1.5  Current Nutrition Support Rate Ordered: 55 (ml)  Current Nutrition Support Frequency Ordered: ml/hr    Evaluation of Received Nutrient/Fluid Intake    Enteral Calories (kcal): 1980  Enteral Protein (gm): 109  Enteral (Free Water) Fluid (mL): 1002  % Kcal Needs: 103%  % Protein Needs: 100%  I/O: +2.4 L since admit  Energy Calories Required: meeting needs  Protein Required: meeting needs  Fluid Required: meeting needs  Tolerance: tolerating  % Intake of Estimated Energy Needs: 103%  % Meal Intake: NPO    Nutrition Risk    Level of Risk/Frequency of Follow-up:  (1 time/week)     Monitor and Evaluation    Food and Nutrient Intake: enteral nutrition intake  Food and Nutrient Adminstration: enteral and parenteral nutrition administration  Knowledge/Beliefs/Attitudes: food and nutrition knowledge/skill, beliefs and attitudes  Physical Activity and Function: nutrition-related ADLs and IADLs  Anthropometric Measurements: height/length, weight change, body mass index,  weight  Biochemical Data, Medical Tests and Procedures: electrolyte and renal panel, gastrointestinal profile, glucose/endocrine profile, lipid profile, inflammatory profile  Nutrition-Focused Physical Findings: overall appearance     Nutrition Follow-Up    RD Follow-up?: Yes    Francia PEARSON

## 2022-11-14 NOTE — PROGRESS NOTES
Pt extubated without parameters as ordered for comfort care per RRTRufina. Sats 88-90%, Morphine drip in use as well as prns to provide optimal comfort, WCTCM. Plan is to transfer home tomorrow via ambulance to  Hospice. NAD noted at this time.

## 2022-11-14 NOTE — PLAN OF CARE
11/14/22 1115   Post-Acute Status   Post-Acute Authorization Hospice   Hospice Status Referrals Sent  ([facility])     ZEE advised by MD that Pt will need hospice, planned dc tomorrow. Pt wife would like [facility] and would like to get everything set up today.     Sent referral via [facility].     ZEE met with Pt wife and family member at bedside to provide update.    ZEE received call from Yocasta with [facility] ([phone]). She reported she spoke with Pt wife and is having the liaison to reach out and meet with her at the home later today. They are accepting and good with getting orders. Discussed possible O2 needs and suction. Once they get the orders and have a time to shoot for to dc tomorrow, they will call this SW so it can be set up.     ZEE obtained orders from MD team and sent via [facility].     Ros Figueroa LCSW  Neurocritical Care   [facility]  [id number]

## 2022-11-14 NOTE — PROGRESS NOTES
[facility] - Neuro Critical Care  Neurocritical Care  Progress Note    Admit Date: 11/5/2022  Service Date: 11/14/2022  Length of Stay: 9    Subjective:     Chief Complaint: Cerebrovascular accident (CVA) due to embolism of left middle cerebral artery    History of Present Illness: Mr. Luis Sorto Jr. is a 76 y.o. male with PMHx of AFib (on Eliquis),CAD, HTN, DM II, s/p LAAO w/Amulet procedure on 11/04/2022 who presents as a transfer from [facility] to Neuro Critical Care s/p [facility] for evaluation of L MCA stroke w/ICH. HPI information gathered from review of the patient's medical record due to the patient being intubated, no family at the bedside.  Patient was LKN around 2000.  He had been discharged home s/p LAAO w/Amulet device on 11/4/2022 around 1000 am.  He acutely developed confusion with difficulty walking and standing, difficulty conversing, and difficulty finding his bathroom.  There were no reported preceding symptoms and nothing was reported to exacerbate or alleviate the symptoms. He presented to [facility] ED where a CTH was obtained and was negative for acute findings.  Telestroke consult was performed by . TNKase was administered as the patient had been off of Eliquis >24h .  Of note, per the ED record patient was noted to have increased pain and swelling in the right groin in the area of the venous access from his Cardiology procedure and then the patient began to have bleeding and bruising in the anterior thigh on he right side. Patient became hemodynamically unstable and was started on Levophed. Outside Hospital Cardiology deparment placed an IV in the left groin for access. Patient was then stabilized. CT Head was ordered and showed Left ICH. Patient was intubated at outside hospital. Cryo was ordered at the outside hospital but not given. Patient was then transferred to [facility] Neuro [facility] for a higher level of care. On arrival to [facility] the  patient is intubated on fentanyl and levophed, NIH 22, hemodynamically unstable, with swelling to the Right groin, unresponsive, Cryo re-ordered and CT Head STAT. Patient will be admitted to Neuro ICU for a higher level of care.     Hospital Course: 11/06/2022: Patient is now s/p R pseudoaneurysm repair. Patient with slightly worsening ICH overnight, exam unchanged. Some oozing from the R groin site, however improved after holding pressure and applying a sandbag. Will consider FFP if continues to oozy from groin site. Patient off pressors, fluids increased. TYSON pending due to Echo findings and concern for possible thrombus.Tube feedings started.  11/07/2022: TYSON shows no thrombus and improved EF. Weaning sedation as tolerated. Discontinue venous femoral line. Restart tube feedings post TYSON.  11/08/2022: NAEON. Weaning sedation as tolerated. Transitioning from fentanyl gtt to precedex gtt. Oozing from cath site resolved. Plan for pressure support trial this afternoon. Transitioned from insulin gtt to subq insulin.  11/09/2022: NAEON. Tolerated SBT. Ceftriaxone started for UTI.   11/10/2022: NAEON. Amantadine started. GOC conversation had with son. Continuing SBT. Remains intermittently febrile with improving leukocytosis. Respiratory culture pending. US of the extremities pending to rule out DVT. ETT advanced.   11/11/2022: gram negative in resp cx- start Zosyn, updated patient's wife at bedside, GOC discussed today and Patient's wife will have a decision on Monday 11/12/2022: NAEON. Sputum growing GNR, abx broadened. CT abd/pelvis shows stable R groin pseudoaneurysm repair with surrounding edema and new finding of metastatic bone lesions likely secondary to prostate CA given enlarged prostate and elevated PSA. Discussed findings with wife at the bedside. Pulmonary toileting started, SBT today.  11/13/2022: Patient developed AF with RVR overnight. Lopressor given with little response. Amiodarone started. Metoprolol  po restarted.  11/14/2022: ARIADNE. Patient's wife and son at the bedside and have decided to withdrawal care and transition to comfort care. Patient started on Morphine gtt and comfort care order set ordered.    Review of Systems: Unable to obtain a complete ROS due to level of consciousness.     Vitals:   Temp: (!) 100.7 °F (38.2 °C)  Pulse: (!) 116  Rhythm: atrial rhythm  BP: (!) 149/67  MAP (mmHg): 97  Resp: 18  SpO2: 97 %  Oxygen Concentration (%): 30  O2 Device (Oxygen Therapy): ventilator  Vent Mode: Spont  Pressure Support: 10 cmH20  PEEP/CPAP: 8 cmH20  Peak Airway Pressure: 18 cmH2O  Mean Airway Pressure: 12 cmH20  Plateau Pressure: 0 cmH20    Temp  Min: 99.6 °F (37.6 °C)  Max: 101.9 °F (38.8 °C)  Pulse  Min: 96  Max: 126  BP  Min: 107/65  Max: 157/79  MAP (mmHg)  Min: 80  Max: 114  Resp  Min: 12  Max: 36  SpO2  Min: 95 %  Max: 100 %  Oxygen Concentration (%)  Min: 30  Max: 30    11/13 0701 - 11/14 0700  In: 2947.1 [I.V.:794.4]  Out: 1535 [Urine:1235]         Examination:   Constitutional: Well-nourished and -developed. No apparent distress.   Eyes: Conjunctiva clear, anicteric. Lids no lesions.  Head/Ears/Nose/Mouth/Throat/Neck: Moist mucous membranes. External ears, nose atraumatic.   Cardiovascular: Regular rhythm. No leg edema.  Respiratory: ETT. Comfortable respirations. Clear to auscultation.  Gastrointestinal: Soft, nondistended, nontender. + bowel sounds.    Neurologic:  -GCS E 3 V 1t M 5  -Opens eyes to voice. Does not track. Grimaces to noxious stimuli. Unable to follow commands.  -Cranial nerves: EOM intact, PERRL , no facial droop, + cough  -Motor: LUE localizes to pain, LLE withdraws to pain, RUE extensor posturing, RLE withdraws to pain  -Sensation: Intact to light touch  Unable to test orientation, language, memory, judgment, insight, fund of knowledge, shoulder shrug, tongue protrusion, coordination, gait due to level of consciousness.    Medications:   Continuousmorphine, Last Rate: 2.5 mg/hr  (11/14/22 1302)  Scheduledscopolamine, 1 patch, Q3 Days  PRNLORazepam, 1 mg, Q2H PRN  morphine, 2 mg, Q1H PRN  ondansetron, 8 mg, Q8H PRN     Today I independently reviewed pertinent medications, lines/drains/airways, imaging, cardiology results, laboratory results, microbiology results, notably:     ISTAT:   Recent Labs   Lab 11/14/22  0600   PH 7.521*   PCO2 34.8*   PO2 122*   POCSATURATED 99   HCO3 28.5*   BE 6   POCTCO2 30*   SAMPLE ARTERIAL      Chem:   Recent Labs   Lab 11/14/22  0339      K 3.9      CO2 25   *   BUN 11   CREATININE 0.7   CALCIUM 8.0*   MG 2.4   PHOS 3.0   ANIONGAP 11   PROT 6.1   ALBUMIN 1.9*   BILITOT 1.3*   ALKPHOS 253*   AST 63*   ALT 67*     Heme:   Recent Labs   Lab 11/14/22  0339   WBC 14.14*   HGB 8.3*   HCT 27.3*        Endo:   Recent Labs   Lab 11/13/22 2039 11/14/22 0059 11/14/22  0535   POCTGLUCOSE 133* 169* 161*     Assessment/Plan:     Neuro  * Cerebrovascular accident (CVA) due to embolism of left middle cerebral artery  s/p Tenecteplase with new Left ICH   - Continue to hold ASA    Left-sided nontraumatic intracerebral hemorrhage   stroke s/p Tenecteplase with new Left ICH   -- AFib (on Eliquis)  -- s/p LAAO w/Amulet procedure on 11/04/2022 s/p TNKase  -- Continue Neuro checks q 1hr  -- Vascular Neurology consulted  -- Neurosurgery consulted  -- SBP goal <160   -- PT/OT/Speech  -- Atorvastatin 40 mg  -- Slight increase in size of bleed overnight 11/5, no change in exam  -- No surgical intervention at this time per NSGY team   -- Ongoing goals of care discussions with patient's wife at bedside. Patient's wife stated she will have an answer on Monday  -- Stable exam and imaging    11/14: Patient's wife and son at bedside have decided to proceed with withdrawal of care and transition to comfort care, Morphine gtt ordered, and comfort care order set ordered    Cardiac/Vascular  Pseudoaneurysm of right femoral artery  S/p repair 11/5  Vascular surgery  following, appreciate recs  CT abd/pelvis, stable repair with surrounding edema    Oncology  Malignant neoplasm metastatic to bone  Likely 2/2 Prostate CA    CT abd/pelvis shows:  New heterogeneity of the visualized bones with multiple large sclerotic lesions, worrisome for osseous metastatic disease.  New large Schmorl's node or pathologic fracture involving the superior endplate of L1  As well as enlarged prostate    PSA level 33.4    New findings discussed with wife at bedside on 11/12    The patient is being Prophylaxed for:  Venous Thromboembolism with: Mechanical or Chemical  Stress Ulcer with: H2B  Ventilator Pneumonia with: chlorhexidine oral care    Activity Orders          Turn patient starting at 11/05 1258    Elevate HOB starting at 11/05 0623    Diet NPO: NPO starting at 11/05 0623        DNR    Critical condition in that Patient has a condition that poses threat to life and bodily function: ICH, withdrawal of care, metastatic disease     35 minutes of Critical care time was spent personally by me on the following activities: development of treatment plan with patient or surrogate and bedside caregivers, discussions with consultants, evaluation of patient's response to treatment, examination of patient, ordering and performing treatments and interventions, ordering and review of laboratory studies, ordering and review of radiographic studies, pulse oximetry, antibiotic titration if applicable, vasopressor titration if applicable, re-evaluation of patient's condition. This critical care time did not overlap with that of any other provider or involve time for any procedures. There is high probability for acute neurological change leading to clinical and possibly life-threatening deterioration requiring highest level of physician preparedness for urgent intervention.    Naomie Lewis NP  Neurocritical Care  [facility] - Neuro Critical Care

## 2022-11-14 NOTE — PLAN OF CARE
Mr. Sorto is a 75yo M with PMH of AF (Eliquis), CAD, HTN, DM, S/p LAAO with amulet procedure on 11/4/22. Patient transferred [place] [facility] for L MCA infarct following TNKase. Patient later noted to have L ICH. He was transferred to [facility] for higher level of care and NSGY evaluation.     Patient with multifocal stroke with hemorrhagic transformation, PNA, and suspected prostate CA metastatic to bone. During this admission, Patient's family engaged in GOC discussion with NCC.     Patient's family made decision to switch to comfort care and referrals for hospice sent. Stroke team will sign off at this time in light of patient's family wishes for current plan of care. If anything should change regarding this decision, please contact Vascular Neurology for additional assistance. Thank you for involving our service in the care of this patient.       Luke Yoo PA-C  Vascular Neurology   [phone]

## 2022-11-14 NOTE — ASSESSMENT & PLAN NOTE
stroke s/p Tenecteplase with new Left ICH   -- AFib (on Eliquis)  -- s/p LAAO w/Amulet procedure on 11/04/2022 s/p TNKase  -- Continue Neuro checks q 1hr  -- Vascular Neurology consulted  -- Neurosurgery consulted  -- SBP goal <160   -- PT/OT/Speech  -- Atorvastatin 40 mg  -- Slight increase in size of bleed overnight 11/5, no change in exam  -- No surgical intervention at this time per NSGY team   -- Ongoing goals of care discussions with patient's wife at bedside. Patient's wife stated she will have an answer on Monday  -- Stable exam and imaging    11/14: Patient's wife and son at bedside have decided to proceed with withdrawal of care and transition to comfort care, Morphine gtt ordered, and comfort care order set ordered

## 2022-11-14 NOTE — PLAN OF CARE
Recommendations     Continue TF regimen of Glucerna 1.5 @ 55 ml/hr- provides 1980 kcals, 109 g pro, and 1002 ml free water.      If able to tolerate PO intake, ADAT to diabetic diet- texture per SLP.     RD following.     Goals: Will meet % EEN/EPN by next RD visit.  Nutrition Goal Status: goal met  Communication of RD Recs:  (POC)    Francia Benson PL-LDN

## 2022-11-14 NOTE — PLAN OF CARE
[facility] Care Plan    POC reviewed with Luis Sorto Jr. and family at 0300. No acute events overnight. Pt progressing toward goals. Will continue to monitor. See below and flowsheets for full assessment and VS info.       Is this a stroke patient? yes- Stroke booklet reviewed with patient and family, risk factors identified for patient and stroke booklet remains at bedside for ongoing education.     Neuro:  [place] Coma Scale  Best Eye Response: 2-->(E2) to pain  Best Motor Response: 4-->(M4) withdraws from pain  Best Verbal Response: 1-->(V1) none  [place] Coma Scale Score: 7  Assessment Qualifiers: patient intubated  Pupil PERRLA: yes     24hr Temp:  [99.4 °F (37.4 °C)-101.9 °F (38.8 °C)]     CV:   Rhythm: atrial rhythm  BP goals:   SBP < 160  MAP > 65    Resp:   O2 Device (Oxygen Therapy): ventilator  Vent Mode: Spont  Set Rate: 14 BPM  Oxygen Concentration (%): 30  Vt Set: 500 mL  PEEP/CPAP: 8 cmH20  Pressure Support: 10 cmH20    Plan: wean to extubate    GI/:     Diet/Nutrition Received: tube feeding  Last Bowel Movement: 11/12/22  Voiding Characteristics: urethral catheter (bladder)    Intake/Output Summary (Last 24 hours) at 11/14/2022 0425  Last data filed at 11/14/2022 0401  Gross per 24 hour   Intake 2888.28 ml   Output 1250 ml   Net 1638.28 ml          Labs/Accuchecks:  Recent Labs   Lab 11/13/22  0405   WBC 12.12   RBC 2.84*   HGB 8.7*   HCT 27.6*         Recent Labs   Lab 11/13/22  0405      K 3.9   CO2 26      BUN 13   CREATININE 0.6   ALKPHOS 256*   ALT 78*   AST 71*   BILITOT 1.3*      Recent Labs   Lab 11/10/22  0212   INR 1.2   APTT 24.5    No results for input(s): CPK, CPKMB, TROPONINI, MB in the last 168 hours.    Electrolytes: N/A - electrolytes WDL  Accuchecks: Q4H    Gtts:   amiodarone in dextrose 5% 1 mg/min (11/14/22 0401)    dextrose 10 % in water (D10W)         LDA/Wounds:  Lines/Drains/Airways       Drain  Duration                  NG/OG Tube 11/05/22 0331 orogastric  18 Fr. Center mouth 9 days         Urethral Catheter 22 0630 Non-latex 16 Fr. 8 days         Rectal Tube 22 1015 fecal management system <1 day              Airway  Duration                  Airway - Non-Surgical 22 0328 9 days              Peripheral Intravenous Line  Duration                  Peripheral IV - Single Lumen 22 20 G Anterior;Right Upper Arm 9 days         Peripheral IV - Single Lumen 22 18 G Anterior;Distal;Left Upper Arm 5 days         Peripheral IV - Single Lumen 22 20 G Anterior;Left Forearm 5 days                  Wounds: Yes  Wound care consulted: Yes      Problem: Adult Inpatient Plan of Care  Goal: Plan of Care Review  Outcome: Ongoing, Progressing  Goal: Patient-Specific Goal (Individualized)  Description: Admit Date: 2022    <principal problem not specified>    Past Medical History:  No date: A-fib  No date: Anticoagulant long-term use  No date: Coronary artery disease  No date: Diabetes mellitus      Comment:  no meds  No date: Digestive disorder  No date: Hypertension  No date: Paroxysmal atrial fibrillation  No date: Renal disorder      Comment:  kidney stone  No date: Sleep apnea      Comment:  c pap machine used  No date: Stroke      Comment:  tia  No date: Unspecified glaucoma    Past Surgical History:  No date: CARDIOVERSION  No date: CYSTOSCOPY  No date: EYE SURGERY  No date: JOINT REPLACEMENT; Bilateral  11/3/2022: TRANSESOPHAGEAL ECHOCARDIOGRAPHY; N/A      Comment:  Procedure: ECHOCARDIOGRAM, TRANSESOPHAGEAL;  Surgeon:                Ahmet Bowers MD;  [facility];  Service:                Cardiology;  Laterality: N/A;    Individualization:   1. Bear hugger     Restraints:   Restraint Order  Length of Order: Order good for next 24 hours or when removed.  Date that the current order will : 22  Time that the current order will : 704  Order Upon Application: Yes         Outcome: Ongoing, Progressing  Goal: Absence of  Hospital-Acquired Illness or Injury  Outcome: Ongoing, Progressing     Problem: Fall Injury Risk  Goal: Absence of Fall and Fall-Related Injury  Outcome: Ongoing, Progressing     Problem: Restraint, Nonbehavioral (Nonviolent)  Goal: Absence of Harm or Injury  Outcome: Ongoing, Progressing

## 2022-11-14 NOTE — PLAN OF CARE
[facility]  Department of Hospital Medicine  [address] [place] [address]  [phone] after hours  [phone] fax    HOSPICE  ORDERS    11/14/2022    Admit to Hospice:  [facility]    Diagnoses:   Active Hospital Problems    Diagnosis  POA    *Cerebrovascular accident (CVA) due to embolism of left middle cerebral artery [I63.412]  Yes    Comfort measures only status [Z51.5]  Not Applicable    Malignant neoplasm metastatic to bone [C79.51]  Yes    Bruise [T14.8XXA]  No    Class 2 obesity with body mass index (BMI) of 37.0 to 37.9 in adult [E66.9, Z68.37]  Not Applicable     Chronic    Acute cystitis without hematuria [N30.00]  Yes     klebsiella      Presence of Amulet left atrial appendage closure device [Z95.818]  Yes    Brain compression [G93.5]  Yes    Debility [R53.81]  Yes    Pseudoaneurysm of right femoral artery [I72.4]  Yes    On mechanically assisted ventilation [Z99.11]  Not Applicable    Acute systolic heart failure [I50.21]  Yes    Oral phase dysphagia [R13.11]  Yes    Left-sided nontraumatic intracerebral hemorrhage [I61.9]  Yes    Diabetes mellitus [E11.9]  Yes    Hyperlipidemia [E78.5]  Yes    Groin swelling [R19.09]  Yes    Persistent atrial fibrillation [I48.19]  Yes      Resolved Hospital Problems    Diagnosis Date Resolved POA    Lactic acidosis [E87.20] 11/09/2022 Yes    Hypovolemic shock [R57.1] 11/08/2022 Yes       Hospice Qualifying Diagnoses:        Patient has a life expectancy < 6 months due to:  Primary Hospice Diagnosis:  L ICH  Comorbid Conditions Contributing to Decline:  Metastatic disease, dysphagia, Heart failure, Atrial fibrillation, respiratory failure, pneumonia, UTI, DM II    Vital Signs: Routine per Hospice Protocol.    Code Status: DNR    Allergies: Review of patient's allergies indicates:  No Known Allergies    Diet: NPO    Activities: As tolerated    Goals of Care Treatment  Preferences:  Code Status: DNR          What is most important right now is to focus on spending time at home, comfort and QOL .  Accordingly, we have decided that the best plan to meet the patient's goals includes enrolling in hospice care.      Nursing: Per Hospice Routine.    Garcia Care: Empty Garcia bag Q shift and PRN.  Change Garcia every month.    Routine Skin for Bedridden Patients: Apply moisture barrier cream to all skin folds and   wet areas in perineal area daily and after baths and all bowel movements.    Oxygen: O2 via nasal cannula as needed for comfort    Medications:        Medication List        START taking these medications      scopolamine 1.3-1.5 mg (1 mg over 3 days)  Commonly known as: TRANSDERM-SCOP  Place 1 patch onto the skin Every 3 (three) days.  Start taking on: November 17, 2022            STOP taking these medications      aspirin 81 MG Chew     atorvastatin 40 MG tablet  Commonly known as: LIPITOR     CENTRUM CHEWABLES ORAL     cholecalciferol (vitamin D3) 50 mcg (2,000 unit) Cap capsule  Commonly known as: VITAMIN D3     clopidogreL 75 mg tablet  Commonly known as: PLAVIX     metoprolol tartrate 25 MG tablet  Commonly known as: LOPRESSOR     NON FORMULARY MEDICATION     pantoprazole 40 MG tablet  Commonly known as: PROTONIX     propafenone 150 MG Tab  Commonly known as: RHTHYMOL     ramipriL 10 MG capsule  Commonly known as: ALTACE     ramipriL 5 MG capsule  Commonly known as: ALTACE     tamsulosin 0.4 mg Cap  Commonly known as: FLOMAX     travoprost (benzalkonium) 0.004 % ophthalmic solution  Commonly known as: TRAVATAN     travoprost 0.004 % ophthalmic solution  Commonly known as: TRAVATAN Z            Future Orders:  Hospice Medical Director may dictate new orders for comfortable care measures & sign death certificate.        _________________________________  Naomie Lewis NP  11/14/2022

## 2022-11-15 VITALS
WEIGHT: 257 LBS | DIASTOLIC BLOOD PRESSURE: 52 MMHG | RESPIRATION RATE: 9 BRPM | HEART RATE: 125 BPM | SYSTOLIC BLOOD PRESSURE: 89 MMHG | HEIGHT: 69 IN | TEMPERATURE: 101 F | BODY MASS INDEX: 38.06 KG/M2 | OXYGEN SATURATION: 69 %

## 2022-11-15 PROCEDURE — 25000003 PHARM REV CODE 250

## 2022-11-15 PROCEDURE — 94761 N-INVAS EAR/PLS OXIMETRY MLT: CPT

## 2022-11-15 PROCEDURE — 63600175 PHARM REV CODE 636 W HCPCS: Performed by: NURSE PRACTITIONER

## 2022-11-15 PROCEDURE — 63600175 PHARM REV CODE 636 W HCPCS: Performed by: PHYSICIAN ASSISTANT

## 2022-11-15 PROCEDURE — 99233 SBSQ HOSP IP/OBS HIGH 50: CPT | Mod: ,,, | Performed by: PSYCHIATRY & NEUROLOGY

## 2022-11-15 PROCEDURE — 99233 PR SUBSEQUENT HOSPITAL CARE,LEVL III: ICD-10-PCS | Mod: ,,, | Performed by: PSYCHIATRY & NEUROLOGY

## 2022-11-15 PROCEDURE — 99900026 HC AIRWAY MAINTENANCE (STAT)

## 2022-11-15 PROCEDURE — 99900035 HC TECH TIME PER 15 MIN (STAT)

## 2022-11-15 PROCEDURE — 25000003 PHARM REV CODE 250: Performed by: NURSE PRACTITIONER

## 2022-11-15 RX ORDER — MIDAZOLAM HYDROCHLORIDE 1 MG/ML
0.5 INJECTION INTRAMUSCULAR; INTRAVENOUS
Status: DISCONTINUED | OUTPATIENT
Start: 2022-11-15 | End: 2022-11-15

## 2022-11-15 RX ORDER — MIDAZOLAM HYDROCHLORIDE 1 MG/ML
0.5 INJECTION INTRAMUSCULAR; INTRAVENOUS
Status: DISCONTINUED | OUTPATIENT
Start: 2022-11-15 | End: 2022-11-15 | Stop reason: HOSPADM

## 2022-11-15 RX ORDER — ACETAMINOPHEN 650 MG/1
650 SUPPOSITORY RECTAL EVERY 4 HOURS PRN
Status: DISCONTINUED | OUTPATIENT
Start: 2022-11-15 | End: 2022-11-15 | Stop reason: HOSPADM

## 2022-11-15 RX ADMIN — MIDAZOLAM 0.5 MG: 1 INJECTION INTRAMUSCULAR; INTRAVENOUS at 01:11

## 2022-11-15 RX ADMIN — MORPHINE SULFATE 10 MG/HR: 4 INJECTION INTRAVENOUS at 04:11

## 2022-11-15 RX ADMIN — MORPHINE SULFATE 2 MG: 2 INJECTION, SOLUTION INTRAMUSCULAR; INTRAVENOUS at 12:11

## 2022-11-15 RX ADMIN — MORPHINE SULFATE 10 MG/HR: 4 INJECTION INTRAVENOUS at 01:11

## 2022-11-15 RX ADMIN — MORPHINE SULFATE 2 MG: 2 INJECTION, SOLUTION INTRAMUSCULAR; INTRAVENOUS at 05:11

## 2022-11-15 RX ADMIN — MORPHINE SULFATE 2 MG: 2 INJECTION, SOLUTION INTRAMUSCULAR; INTRAVENOUS at 03:11

## 2022-11-15 RX ADMIN — MORPHINE SULFATE 2 MG: 2 INJECTION, SOLUTION INTRAMUSCULAR; INTRAVENOUS at 01:11

## 2022-11-15 RX ADMIN — MIDAZOLAM 0.5 MG: 1 INJECTION INTRAMUSCULAR; INTRAVENOUS at 05:11

## 2022-11-15 RX ADMIN — ACETAMINOPHEN 650 MG: 650 SUPPOSITORY RECTAL at 12:11

## 2022-11-15 NOTE — PT/OT/SLP DISCHARGE
Occupational Therapy Discharge Summary    Luis Sorto Jr.  MRN: [id number]   Principal Problem: Cerebrovascular accident (CVA) due to embolism of left middle cerebral artery      Patient Discharged from acute Occupational Therapy on 11/14.      Assessment:      Patient appropriate for care in another setting.    Objective:     GOALS:   Multidisciplinary Problems       Occupational Therapy Goals          Problem: Occupational Therapy    Goal Priority Disciplines Outcome Interventions   Occupational Therapy Goal     OT, PT/OT Ongoing, Progressing    Description: Goals set 11/8 to be addressed for 14 days with expiration date, 11/22:  Patient will increase functional independence with ADLs by performing:    Patient will demonstrate rolling to the right with max assist.  Not met   Patient will demonstrate rolling to the left with max assist.   Not met  Patient will demonstrate supine -sit with max  assist.   Not met  Patient will demonstrate stand pivot transfers with max assist.   Not met  Patient will demonstrate grooming while seated with max assist.   Not met  Patient will demonstrate upper body dressing with max assist while seated EOB.   Not met  Patient will demonstrate lower body dressing with max assist while seated EOB.   Not met  Patient will demonstrate toileting with max assist.   Not met  Patient's family / caregiver will demonstrate independence and safety with assisting patient with self-care skills and functional mobility.     Not met  Patient's family / caregiver will demonstrate independence with providing ROM and changes in bed positioning.   Not met  Patient and/or patient's family will verbalize understanding of stroke prevention guidelines, personal risk factors and stroke warning signs via teachback method.  Not met                                  Reasons for Discontinuation of Therapy Services  Transfer to alternate level of care.      Plan:     Patient Discharged to: Palliative  Care/Hospice    11/15/2022

## 2022-11-15 NOTE — PROGRESS NOTES
Pt discharged home via  stretcher per [facility] to  hospice with [facility] , son here until patient dc'd, Debbie notified with [facility]. Morphine drip dc'd just prior to transfer additional dose not given as patient was having more frequent episodes of apnea. IV's x 2 left as well as flexi and bhardwaj, hospice aware and Ok'd.

## 2022-11-15 NOTE — PLAN OF CARE
[facility] - Neuro Critical Care  Discharge Final Note    Primary Care Provider: Lui Santamaria MD    Expected Discharge Date: 11/15/2022    Patient to discharge to [facility] via [facility].     Final Discharge Note (most recent)       Final Note - 11/15/22 1319          Final Note    Assessment Type Final Discharge Note     Anticipated Discharge Disposition Hospice/Home                     Important Message from Medicare-- NA             Contact Info       [address]  [facility] [address]   Phone: [phone]       Next Steps: Follow up today    Instructions: Hospice          Alyse Santamaria RN, CCRN-K, CCM  Neuro-Critical Care   X [phone]

## 2022-11-15 NOTE — DISCHARGE SUMMARY
[facility] - Neuro Critical Care  Neurocritical Care  Discharge Summary    Admit Date: 11/5/2022    Service Date: 11/15/2022    Discharge Date: 11/15/2022    Length of Stay: 10    Final Active Diagnoses:    Diagnosis Date Noted POA    PRINCIPAL PROBLEM:  Cerebrovascular accident (CVA) due to embolism of left middle cerebral artery [I63.412] 11/04/2022 Yes    Comfort measures only status [Z51.5] 11/14/2022 Not Applicable    Malignant neoplasm metastatic to bone [C79.51] 11/12/2022 Yes    Bruise [T14.8XXA] 11/11/2022 No    Class 2 obesity with body mass index (BMI) of 37.0 to 37.9 in adult [E66.9, Z68.37] 11/10/2022 Not Applicable     Chronic    Acute cystitis without hematuria [N30.00] 11/09/2022 Yes    Presence of Amulet left atrial appendage closure device [Z95.818] 11/07/2022 Yes    Brain compression [G93.5] 11/07/2022 Yes    Debility [R53.81] 11/07/2022 Yes    Pseudoaneurysm of right femoral artery [I72.4] 11/06/2022 Yes    On mechanically assisted ventilation [Z99.11] 11/06/2022 Not Applicable    Acute systolic heart failure [I50.21] 11/06/2022 Yes    Oral phase dysphagia [R13.11] 11/06/2022 Yes    Left-sided nontraumatic intracerebral hemorrhage [I61.9] 11/05/2022 Yes    Diabetes mellitus [E11.9] 11/05/2022 Yes    Hyperlipidemia [E78.5] 11/05/2022 Yes    Groin swelling [R19.09] 11/05/2022 Yes    Persistent atrial fibrillation [I48.19] 02/26/2017 Yes      Problems Resolved During this Admission:    Diagnosis Date Noted Date Resolved POA    Lactic acidosis [E87.20] 11/06/2022 11/09/2022 Yes    Hypovolemic shock [R57.1] 11/06/2022 11/08/2022 Yes      History of Present Illness: Mr. Luis Sorto Jr. is a 76 y.o. male with PMHx of AFib (on Eliquis),CAD, HTN, DM II, s/p LAAO w/Amjimt procedure on 11/04/2022 who [place] to Neuro Critical Care s/p [facility] for evaluation of L MCA stroke w/ICH. HPI information gathered from review of the patient's  "medical record due to the patient being intubated, no family at the bedside.  Patient was LKN around 2000.  He had been discharged home s/p LAAO w/Amulet device on 11/4/2022 around 1000 am.  He acutely developed confusion with difficulty walking and standing, difficulty conversing, and difficulty finding his bathroom.  There were no reported preceding symptoms and nothing was reported to exacerbate or alleviate the symptoms. He presented to [facility] ED where a CTH was obtained and was negative for acute findings.  Telestroke consult was performed by . TNKase was administered as the patient had been off of Eliquis >24h .  Of note, per the ED record patient was noted to have increased pain and swelling in the right groin in the area of the venous access from his Cardiology procedure and then the patient began to have bleeding and bruising in the anterior thigh on he right side. Patient became hemodynamically unstable and was started on Levophed. Outside Hospital Cardiology deparment placed an IV in the left groin for access. Patient was then stabilized. CT Head was ordered and showed Left ICH. Patient was intubated at outside hospital. Cryo was ordered at the outside hospital but not given. Patient was then transferred to [facility] for a higher level of care. On arrival to [facility] the patient is intubated on fentanyl and levophed, NIH 22, hemodynamically unstable, with swelling to the Right groin, unresponsive, Cryo re-ordered and CT Head STAT. Patient will be admitted to Neuro ICU for a higher level of care.     BP (!) 113/53   Pulse (!) 140   Temp (!) 101 °F (38.3 °C)   Resp 18   Ht 5' 9" (1.753 m)   Wt 116.6 kg (257 lb)   SpO2 (!) 71%   BMI 37.95 kg/m²      Physical Exam  Constitutional:       Comments: Comfortable, unresponsive   HENT:      Head: Normocephalic and atraumatic.      Right Ear: External ear normal.      Left Ear: External ear normal.      Nose: Nose " normal.   Eyes:      Comments: Eyes closed; deferred assessment for comfort reasons   Cardiovascular:      Comments: Regular rate and rhythm noted on telemetry  Pulmonary:      Comments: Increased effort; relieved with PRN morphine  Skin:     General: Skin is warm and dry.   Neurological:      Comments: Unresponsive; comfortable appearance     *majority of physical exam deferred for comfort measures only purposes    Hospital Course by Event: 11/06/2022: Patient is now s/p R pseudoaneurysm repair. Patient with slightly worsening ICH overnight, exam unchanged. Some oozing from the R groin site, however improved after holding pressure and applying a sandbag. Will consider FFP if continues to oozy from groin site. Patient off pressors, fluids increased. TYSON pending due to Echo findings and concern for possible thrombus.Tube feedings started.  11/07/2022: TYSON shows no thrombus and improved EF. Weaning sedation as tolerated. Discontinue venous femoral line. Restart tube feedings post TYSON.  11/08/2022: NAEON. Weaning sedation as tolerated. Transitioning from fentanyl gtt to precedex gtt. Oozing from cath site resolved. Plan for pressure support trial this afternoon. Transitioned from insulin gtt to subq insulin.  11/09/2022: NAEON. Tolerated SBT. Ceftriaxone started for UTI.   11/10/2022: NAEON. Amantadine started. GOC conversation had with son. Continuing SBT. Remains intermittently febrile with improving leukocytosis. Respiratory culture pending. US of the extremities pending to rule out DVT. ETT advanced.   11/11/2022: gram negative in resp cx- start Zosyn, updated patient's wife at bedside, GOC discussed today and Patient's wife will have a decision on Monday 11/12/2022: NAEON. Sputum growing GNR, abx broadened. CT abd/pelvis shows stable R groin pseudoaneurysm repair with surrounding edema and new finding of metastatic bone lesions likely secondary to prostate CA given enlarged prostate and elevated PSA. Discussed  findings with wife at the bedside. Pulmonary toileting started, SBT today.  11/13/2022: Patient developed AF with RVR overnight. Lopressor given with little response. Amiodarone started. Metoprolol po restarted.  11/14/2022: NAEON. Patient's wife and son at the bedside and have decided to withdrawal care and transition to comfort care. Patient started on Morphine gtt and comfort care order set ordered.      Hospital Course by Problem:   * Cerebrovascular accident (CVA) due to embolism of left middle cerebral artery  s/p Tenecteplase with new Left ICH   - Continue to hold ASA    Malignant neoplasm metastatic to bone  Likely 2/2 Prostate CA    CT abd/pelvis shows:  New heterogeneity of the visualized bones with multiple large sclerotic lesions, worrisome for osseous metastatic disease.  New large Schmorl's node or pathologic fracture involving the superior endplate of L1  As well as enlarged prostate    PSA level 33.4    New findings discussed with wife at bedside on 11/12    Class 2 obesity with body mass index (BMI) of 37.0 to 37.9 in adult  Body mass index is 37.95 kg/m².     Acute cystitis without hematuria  - Urine culture positive for Klebsiella  - Abx broadened to Zosyn    Oral phase dysphagia  Intubated  OGT in place for nutrition and meds  Cont tube feedings    Acute systolic heart failure  Echo shows:   The following segments are hypokinetic: mid inferoseptal, apical septal and apex. Small dyskinetic apical cap with no thrombus seen. All other segments are normal.   The estimated ejection fraction is 45%.   The left ventricle is normal in size with concentric remodeling and mildly decreased systolic function.   Left ventricular diastolic dysfunction.   Normal right ventricular size with normal right ventricular systolic function.   Severe left atrial enlargement.   The estimated PA systolic pressure is 28 mmHg.   Normal central venous pressure (3 mmHg).    TYSON 11/7 shows no thrombus, improved EF of  55%    On mechanically assisted ventilation  Vent Mode: SIMV  Oxygen Concentration (%):  [30] 30  Resp Rate Total:  [18 br/min-25 br/min] 22 br/min  Vt Set:  [500 mL] 500 mL  PEEP/CPAP:  [5 cmH20] 5 cmH20  Pressure Support:  [5 cmH20] 5 cmH20  Mean Airway Pressure:  [8.4 cmH20-9.8 cmH20] 9 cmH20     Daily CXR and abg  Respiratory culture growing gram neg rods  Abx broadened to Zosyn      Pseudoaneurysm of right femoral artery  S/p repair 11/5  Vascular surgery following, appreciate recs  CT abd/pelvis, stable repair with surrounding edema    Groin swelling  -- s/p LAAO w/Amulet procedure on 11/04/2022  s/p TNKase  -- Stable s/p pseudoaneurysm repair    Hyperlipidemia  -- Lipid panel  -- Atorvastatin 40 mg daily    Diabetes mellitus  Diabetes Mellitus Type II  -- Hyperglycemic in the 300s on admit  -- HgbA1c 6.1  -- Insulin gtt dc'd 11/8, transitioned to subq insulin with SSI  -- Scheduled aspart decreased due to hypoglycemia  -- SSI  -- Accuchecks  -- Diabetic tube feeds    Left-sided nontraumatic intracerebral hemorrhage   stroke s/p Tenecteplase with new Left ICH   -- AFib (on Eliquis)  -- s/p LAAO w/Amulet procedure on 11/04/2022 s/p TNKase  -- Continue Neuro checks q 1hr  -- Vascular Neurology consulted  -- Neurosurgery consulted  -- SBP goal <160   -- PT/OT/Speech  -- Atorvastatin 40 mg  -- Slight increase in size of bleed overnight 11/5, no change in exam  -- No surgical intervention at this time per NSGY team   -- Ongoing goals of care discussions with patient's wife at bedside. Patient's wife stated she will have an answer on Monday  -- Stable exam and imaging    11/14: Patient's wife and son at bedside have decided to proceed with withdrawal of care and transition to comfort care, Morphine gtt ordered, and comfort care order set ordered    Persistent atrial fibrillation  -- AFib (on Eliquis), stopped prior to procedure LAAO w/Amulet procedure on 11/04/2022   -- Goal K > 4, Mg > 2   -- Continue  metoprolol  -- Developed RVR overnight 11/12, now on amiodarone gtt        Goals of Care Treatment Preferences:  Code Status: DNR          What is most important right now is to focus on spending time at home, comfort and QOL .  Accordingly, we have decided that the best plan to meet the patient's goals includes enrolling in hospice care.      Laboratory:  Lab Results   Component Value Date    HGBA1C 6.1 (H) 11/05/2022    CHOL 70 (L) 11/05/2022    HDL 26 (L) 11/05/2022    LDLCALC 27.6 (L) 11/05/2022    TRIG 79 11/07/2022    TSH 2.087 11/05/2022       Consultations:  IP CONSULT TO VASCULAR (STROKE) NEUROLOGY  IP CONSULT TO SOCIAL WORK/CASE MANAGEMENT  IP CONSULT TO NEUROSURGERY  WOUND CARE CONSULT  IP CONSULT TO VASCULAR SURGERY  IP CONSULT TO REGISTERED DIETITIAN/NUTRITIONIST      Procedures:   Procedure(s) (LRB):  ECHOCARDIOGRAM,TRANSESOPHAGEAL (N/A) by [facility] Diagnostic Provider.      Medications:     Activity: Comfort care measures; activity as comfortably tolerated    Disposition: Discharged to home hospice      This discharge took more than 30 minutes to complete.    Yaneth Shukla MD  Neurocritical Care  [facility] - Neuro Critical Care

## 2022-11-18 ENCOUNTER — TELEPHONE (OUTPATIENT)
Dept: VASCULAR SURGERY | Facility: CLINIC | Age: 77
End: 2022-11-18
Payer: MEDICARE

## 2022-11-18 NOTE — TELEPHONE ENCOUNTER
Contacted pt's wife to schedule appt. Wife states pt passed away on Tuesday while on his way home with [facility] ambulance service. Wife requested pt's chart be marked . ----- Message from Tiago Pate MD sent at 2022  8:47 AM CST -----  That would help wouldn't it? Here ya go! Sorry about that!  ----- Message -----  From: Barbara Mendoza RN  Sent: 2022   8:45 AM CST  To: Tiago Pate MD    There's no patient attached to this message.    ----- Message -----  From: Tiago Pate MD  Sent: 2022  10:39 AM CST  To: [facility] Staff    Please set this patient up for follow up in 1 week for drain removal. Thanks!

## 2022-11-23 NOTE — PHYSICIAN QUERY
PT Name: Luis Sorto Jr.  MR #: [id number]    DOCUMENTATION CLARIFICATION     CDS/: Jess Kincaid RN CDIS              Contact information: [email or web address]      This form is a permanent document in the medical record.     Query Date: November 23, 2022    Dear Provider,  By submitting this query, we are merely seeking further clarification of documentation. Please utilize your independent clinical judgment when addressing the question(s) below.    The medical record contains the following:  Supporting Clinical Findings Location in Medical Record   He had been discharged home s/p LAAO w/Amulet device on 11/4/2022 around 1000 am.  He acutely developed confusion with difficulty walking and standing, difficulty conversing, and difficulty finding his bathroom.  There were no reported preceding symptoms and nothing was reported to exacerbate or alleviate the symptoms. He presented to [facility] ED where a CTH was obtained and was negative for acute findings.  Telestroke consult was performed by . TNKase was administered as the patient had been off of Eliquis >24h     Cerebrovascular accident (CVA) due to embolism of left middle cerebral artery  LMCA s/p Tenecteplase with new Left ICH   --AFib (on Eliquis),  -- s/p LAAO w/Amulet procedure on 11/04/2022  s/p TNKase  --Continue Neuro checks q 1hr  -- Vascular Neurology consulted  -- Neurosurgery consulted  -- CT Head STAT ordered  -- SBP goal <140 (Now with new Left ICH)  -- PT/OT/Speech  -- Atorvastin 40 mg  -- Cryo re-ordered STAT as it was not given at outside hospital  -- Pending further recommendations per NGSY and Stroke  H&P                   H&P        Please clarify if _Cerebrovascular accident (CVA) due to embolism of left middle cerebral artery_ (as it relates to _s/p LAAO w/Amulet device__) is:      [  ] Complication of the procedure   [  ] Present, but not a complication of the procedure   [  ] Other (please specify): __________________    [ x ] Clinically Undetermined       Please document in your progress notes daily for the duration of treatment until resolved and include in your discharge summary.

## 2023-01-14 PROBLEM — Z71.89 GOALS OF CARE, COUNSELING/DISCUSSION: Status: ACTIVE | Noted: 2023-01-14

## 2023-01-14 PROBLEM — E87.1 HYPONATREMIA: Status: ACTIVE | Noted: 2023-01-14

## 2023-01-14 PROBLEM — R50.9 FEVER: Status: ACTIVE | Noted: 2023-01-14

## 2023-01-14 PROBLEM — G93.6 CYTOTOXIC CEREBRAL EDEMA: Status: ACTIVE | Noted: 2023-01-14

## 2023-01-14 PROBLEM — G93.40 ACUTE ENCEPHALOPATHY: Status: ACTIVE | Noted: 2023-01-14

## 2023-01-14 PROBLEM — J18.9 PNEUMONIA DUE TO INFECTIOUS ORGANISM: Status: ACTIVE | Noted: 2023-01-14

## 2023-01-14 PROBLEM — I10 ESSENTIAL HYPERTENSION: Status: ACTIVE | Noted: 2023-01-14

## 2023-01-14 PROBLEM — R74.01 TRANSAMINITIS: Status: ACTIVE | Noted: 2023-01-14

## 2023-01-14 PROBLEM — I63.413 CEREBROVASCULAR ACCIDENT (CVA) DUE TO BILATERAL EMBOLISM OF MIDDLE CEREBRAL ARTERIES: Status: ACTIVE | Noted: 2023-01-14

## 2023-01-14 PROBLEM — R97.20 ELEVATED PSA: Status: ACTIVE | Noted: 2023-01-14

## (undated) DEVICE — DRAIN CHANNEL ROUND 15FR

## (undated) DEVICE — KIT PREVENA PLUS

## (undated) DEVICE — SYR MED RAD 150ML

## (undated) DEVICE — DRESSING TRANS 4X4 TEGADERM

## (undated) DEVICE — SPONGE DERMACEA 4X4IN 12PLY

## (undated) DEVICE — STAPLER SKIN PROXIMATE WIDE

## (undated) DEVICE — Device

## (undated) DEVICE — DRAPE U SPLIT SHEET 54X76IN

## (undated) DEVICE — LOOP VESSEL BLUE MAXI

## (undated) DEVICE — HEMOSTAT SURGICEL 4X8IN

## (undated) DEVICE — SEE MEDLINE ITEM 153688

## (undated) DEVICE — SET DECANTER MEDICHOICE

## (undated) DEVICE — COVER INSTR ELASTIC BAND 40X20

## (undated) DEVICE — SOL NS 1000CC

## (undated) DEVICE — COVERS PROBE NR-48 STERILE

## (undated) DEVICE — EVACUATOR WOUND BULB 100CC